# Patient Record
Sex: MALE | Race: WHITE | NOT HISPANIC OR LATINO | Employment: FULL TIME | URBAN - METROPOLITAN AREA
[De-identification: names, ages, dates, MRNs, and addresses within clinical notes are randomized per-mention and may not be internally consistent; named-entity substitution may affect disease eponyms.]

---

## 2017-07-31 ENCOUNTER — OFFICE VISIT (OUTPATIENT)
Dept: FAMILY MEDICINE | Facility: CLINIC | Age: 37
End: 2017-07-31
Payer: COMMERCIAL

## 2017-07-31 ENCOUNTER — RADIANT APPOINTMENT (OUTPATIENT)
Dept: GENERAL RADIOLOGY | Facility: CLINIC | Age: 37
End: 2017-07-31
Attending: FAMILY MEDICINE
Payer: COMMERCIAL

## 2017-07-31 VITALS
DIASTOLIC BLOOD PRESSURE: 75 MMHG | TEMPERATURE: 99.4 F | HEART RATE: 112 BPM | SYSTOLIC BLOOD PRESSURE: 110 MMHG | OXYGEN SATURATION: 97 % | BODY MASS INDEX: 28.76 KG/M2 | HEIGHT: 71 IN | WEIGHT: 205.4 LBS

## 2017-07-31 DIAGNOSIS — J20.9 ACUTE WHEEZY BRONCHITIS: Primary | ICD-10-CM

## 2017-07-31 DIAGNOSIS — F17.200 TOBACCO USE DISORDER: ICD-10-CM

## 2017-07-31 DIAGNOSIS — R05.9 COUGH: ICD-10-CM

## 2017-07-31 DIAGNOSIS — R07.0 THROAT PAIN: ICD-10-CM

## 2017-07-31 LAB
DEPRECATED S PYO AG THROAT QL EIA: NORMAL
MICRO REPORT STATUS: NORMAL
SPECIMEN SOURCE: NORMAL

## 2017-07-31 PROCEDURE — 99214 OFFICE O/P EST MOD 30 MIN: CPT | Performed by: FAMILY MEDICINE

## 2017-07-31 PROCEDURE — 71020 XR CHEST 2 VW: CPT

## 2017-07-31 PROCEDURE — 87880 STREP A ASSAY W/OPTIC: CPT | Performed by: FAMILY MEDICINE

## 2017-07-31 PROCEDURE — 87081 CULTURE SCREEN ONLY: CPT | Performed by: FAMILY MEDICINE

## 2017-07-31 RX ORDER — ALBUTEROL SULFATE 90 UG/1
2 AEROSOL, METERED RESPIRATORY (INHALATION) EVERY 4 HOURS PRN
Qty: 1 INHALER | Refills: 3 | Status: SHIPPED | OUTPATIENT
Start: 2017-07-31 | End: 2018-10-22

## 2017-07-31 RX ORDER — AZITHROMYCIN 250 MG/1
TABLET, FILM COATED ORAL
Qty: 6 TABLET | Refills: 0 | Status: SHIPPED | OUTPATIENT
Start: 2017-07-31 | End: 2018-10-22

## 2017-07-31 NOTE — PATIENT INSTRUCTIONS
Take azithromycin as prescribed for suspected bacterial bronchitis.  Albuterol inhaler 2 puffs every 4 hrs as needed for wheezing or coughing spells or tightness in breathing while active.    Negative strep screen today.  In 2 days, you will be contacted for the culture result.          Bronchitis, Antibiotic Treatment (Adult)    Bronchitis is an infection of the air passages (bronchial tubes) in your lungs. It often occurs when you have a cold. This illness is contagious during the first few days and is spread through the air by coughing and sneezing, or by direct contact (touching the sick person and then touching your own eyes, nose, or mouth).  Symptoms of bronchitis include cough with mucus (phlegm) and low-grade fever. Bronchitis usually lasts 7 to 14 days. Mild cases can be treated with simple home remedies. More severe infection is treated with an antibiotic.  Home care  Follow these guidelines when caring for yourself at home:    If your symptoms are severe, rest at home for the first 2 to 3 days. When you go back to your usual activities, don't let yourself get too tired.    Do not smoke. Also avoid being exposed to secondhand smoke.    You may use over-the-counter medicines to control fever or pain, unless another medicine was prescribed. (Note: If you have chronic liver or kidney disease or have ever had a stomach ulcer or gastrointestinal bleeding, talk with your healthcare provider before using these medicines. Also talk to your provider if you are taking medicine to prevent blood clots.) Aspirin should never be given to anyone younger than 18 years of age who is ill with a viral infection or fever. It may cause severe liver or brain damage.    Your appetite may be poor, so a light diet is fine. Avoid dehydration by drinking 6 to 8 glasses of fluids per day (such as water, soft drinks, sports drinks, juices, tea, or soup). Extra fluids will help loosen secretions in the nose and  lungs.    Over-the-counter cough, cold, and sore-throat medicines will not shorten the length of the illness, but they may be helpful to reduce symptoms. (Note: Do not use decongestants if you have high blood pressure.)    Finish all antibiotic medicine. Do this even if you are feeling better after only a few days.  Follow-up care  Follow up with your healthcare provider, or as advised. If you had an X-ray or ECG (electrocardiogram), a specialist will review it. You will be notified of any new findings that may affect your care.  Note: If you are age 65 or older, or if you have a chronic lung disease or condition that affects your immune system, or you smoke, talk to your healthcare provider about having pneumococcal vaccinations and a yearly influenza vaccination (flu shot).  When to seek medical advice  Call your healthcare provider right away if any of these occur:    Fever of 100.4 F (38 C) or higher    Coughing up increased amounts of colored sputum    Weakness, drowsiness, headache, facial pain, ear pain, or a stiff neck  Call 911, or get immediate medical care  Contact emergency services right away if any of these occur.    Coughing up blood    Worsening weakness, drowsiness, headache, or stiff neck    Trouble breathing, wheezing, or pain with breathing  Date Last Reviewed: 9/13/2015 2000-2017 [employer]. [address] [place] [address], [place] [address]. All rights reserved. This information is not intended as a substitute for professional medical care. Always follow your healthcare professional's instructions.

## 2017-07-31 NOTE — PROGRESS NOTES
SUBJECTIVE:                                                    Raul Abdul is a 37 year old male who presents to clinic today for the following health issues:  Chief Complaint   Patient presents with     Pharyngitis     Pt has had a st for the past few days.       ENT Symptoms             Symptoms: cc Present Absent Comment   Fever/Chills x   Patient denies measuring temp at home.   Fatigue  x     Muscle Aches  x     Eye Irritation   x    Sneezing  x     Nasal Sandor/Drg  x     Sinus Pressure/Pain   x    Loss of smell   x    Dental pain   x    Sore Throat x      Swollen Glands x      Ear Pain/Fullness   x    Cough  x     Wheeze  x     Chest Pain  x  With cough   Shortness of breath  x     Rash   x    Other  x  Headache and diarrhea today     Symptom duration:  3 days   Symptom severity:  moderate   Treatments tried:  nyquil and mucinex   Contacts:  around some kids on Friday     Verified above history with patient.    Patient states first symptoms were sore throat and itchy eyes 2 days ago.  Patient works in information security - office work.    Patient denies recent treated with abx in last 2 months.    Patient traveled out of state in last few weeks.    Patient states he helped set up a blow up slide day before he felt sick - patient reports the equipment was damp and moldy.    Patient has no known asthma or chronic lung disease.    Problem list and histories reviewed & adjusted, as indicated.  Additional history: as documented    Patient Active Problem List   Diagnosis     Nevus comedonicus     Dyshidrotic dermatitis     Tobacco use disorder     No past surgical history on file.    Social History   Substance Use Topics     Smoking status: Current Every Day Smoker     Packs/day: 0.50     Years: 12.00     Types: Cigarettes     Smokeless tobacco: Never Used     Alcohol use 0.0 oz/week      Comment: Socially, rare. 6 beers in a month     Family History   Problem Relation Age of Onset     DIABETES Mother       "Hypertension Mother      DIABETES Father      Hypertension Father      CANCER Brother 40     Skin cancer, unsure which type         Current Outpatient Prescriptions   Medication Sig Dispense Refill     azithromycin (ZITHROMAX) 250 MG tablet Two tablets first day, then one tablet daily for four days. 6 tablet 0     albuterol (PROAIR HFA/PROVENTIL HFA/VENTOLIN HFA) 108 (90 BASE) MCG/ACT Inhaler Inhale 2 puffs into the lungs every 4 hours as needed for shortness of breath / dyspnea 1 Inhaler 3     sildenafil (REVATIO/VIAGRA) 20 MG tablet Take 1 tablet daily as needed. 30 tablet 0     No Known Allergies      Reviewed and updated as needed this visit by clinical staffAllergies  Meds  Problems       Reviewed and updated as needed this visit by Provider  Allergies  Meds  Problems         ROS:  C: NEGATIVE for fever, chills, or change in weight  I: NEGATIVE for worrisome rashes, moles or lesions  E: NEGATIVE for vision changes or irritation  ENT/MOUTH: see above  RESP:as above  CV: NEGATIVE for chest pain, palpitations or peripheral edema  GI: NEGATIVE for nausea, abdominal pain, heartburn, or change in bowel habits  M: NEGATIVE for significant arthralgias or myalgia    OBJECTIVE:                                                    /75  Pulse 112  Temp 99.4  F (37.4  C) (Tympanic)  Ht 5' 11\" (1.803 m)  Wt 205 lb 6.4 oz (93.2 kg)  SpO2 97%  BMI 28.65 kg/m2  Body mass index is 28.65 kg/(m^2).  GENERAL: alert and no distress  EYES: pink conjunctivae, no icterus  NECK: mildly tender cervical LAD present  HEENT: tympanic membrane intact and pearly bilaterally, nose with moderate congestion, no sinus tenderness, throat mildly erythematous, tonsils grade non-enlarged with no exudates, no oral ulcers  RESP: fair to good air entry; mild crackles right mid to lower lungs, otherwise no crackles all other fields; mild end-expiratory wheezing left upper and right upper/middle/lower lungs.  CV: regular rates and rhythm, " normal S1 S2, no S3 or S4 and no murmur  SKIN:  Good turgor, no rashes    Diagnostic test results:  Diagnostic Test Results:  Results for orders placed or performed in visit on 07/31/17 (from the past 24 hour(s))   Strep, Rapid Screen   Result Value Ref Range    Specimen Description Throat     Rapid Strep A Screen       NEGATIVE: No Group A streptococcal antigen detected by immunoassay, await   culture report.      Micro Report Status FINAL 07/31/2017           ASSESSMENT/PLAN:                                                      ICD-10-CM    1. Acute wheezy bronchitis J20.9 azithromycin (ZITHROMAX) 250 MG tablet     albuterol (PROAIR HFA/PROVENTIL HFA/VENTOLIN HFA) 108 (90 BASE) MCG/ACT Inhaler  Reviewed with patient CXR images; report pending.  No lobar consolidation to suggest pneumonia.  Treating empirically and clinically as bacterial bronchitis.  Discussed symptomatic measures; see below.  Return precautions discussed and given to patient.     2. Throat pain R07.0 Strep, Rapid Screen     Beta strep group A culture  Advised patient  of negative screen; will wait for culture.     3. Cough R05 XR Chest 2 Views  With smoking hx, patient may also has chronic cough that can be aggravated by concommitant infection.     4.      Tobacco use disorder    Follow up with Provider - 2 days if not improved or if worsening   Patient Instructions   Take azithromycin as prescribed for suspected bacterial bronchitis.  Albuterol inhaler 2 puffs every 4 hrs as needed for wheezing or coughing spells or tightness in breathing while active.    Negative strep screen today.  In 2 days, you will be contacted for the culture result.          Bronchitis, Antibiotic Treatment (Adult)    Bronchitis is an infection of the air passages (bronchial tubes) in your lungs. It often occurs when you have a cold. This illness is contagious during the first few days and is spread through the air by coughing and sneezing, or by direct contact (touching  the sick person and then touching your own eyes, nose, or mouth).  Symptoms of bronchitis include cough with mucus (phlegm) and low-grade fever. Bronchitis usually lasts 7 to 14 days. Mild cases can be treated with simple home remedies. More severe infection is treated with an antibiotic.  Home care  Follow these guidelines when caring for yourself at home:    If your symptoms are severe, rest at home for the first 2 to 3 days. When you go back to your usual activities, don't let yourself get too tired.    Do not smoke. Also avoid being exposed to secondhand smoke.    You may use over-the-counter medicines to control fever or pain, unless another medicine was prescribed. (Note: If you have chronic liver or kidney disease or have ever had a stomach ulcer or gastrointestinal bleeding, talk with your healthcare provider before using these medicines. Also talk to your provider if you are taking medicine to prevent blood clots.) Aspirin should never be given to anyone younger than 18 years of age who is ill with a viral infection or fever. It may cause severe liver or brain damage.    Your appetite may be poor, so a light diet is fine. Avoid dehydration by drinking 6 to 8 glasses of fluids per day (such as water, soft drinks, sports drinks, juices, tea, or soup). Extra fluids will help loosen secretions in the nose and lungs.    Over-the-counter cough, cold, and sore-throat medicines will not shorten the length of the illness, but they may be helpful to reduce symptoms. (Note: Do not use decongestants if you have high blood pressure.)    Finish all antibiotic medicine. Do this even if you are feeling better after only a few days.  Follow-up care  Follow up with your healthcare provider, or as advised. If you had an X-ray or ECG (electrocardiogram), a specialist will review it. You will be notified of any new findings that may affect your care.  Note: If you are age 65 or older, or if you have a chronic lung disease or  condition that affects your immune system, or you smoke, talk to your healthcare provider about having pneumococcal vaccinations and a yearly influenza vaccination (flu shot).  When to seek medical advice  Call your healthcare provider right away if any of these occur:    Fever of 100.4 F (38 C) or higher    Coughing up increased amounts of colored sputum    Weakness, drowsiness, headache, facial pain, ear pain, or a stiff neck  Call 911, or get immediate medical care  Contact emergency services right away if any of these occur.    Coughing up blood    Worsening weakness, drowsiness, headache, or stiff neck    Trouble breathing, wheezing, or pain with breathing  Date Last Reviewed: 9/13/2015 2000-2017 [employer]. [address] [place] [address], [place] [address]. All rights reserved. This information is not intended as a substitute for professional medical care. Always follow your healthcare professional's instructions.            Saw Fenton MD  [facility]

## 2017-07-31 NOTE — MR AVS SNAPSHOT
After Visit Summary   7/31/2017    Raul Abdul    MRN: [id number]           Patient Information     Date Of Birth          1980        Visit Information        Provider Department      7/31/2017 6:20 PM Saw Fenton MD [facility]        Today's Diagnoses     Acute wheezy bronchitis    -  1    Throat pain        Cough          Care Instructions    Take azithromycin as prescribed for suspected bacterial bronchitis.  Albuterol inhaler 2 puffs every 4 hrs as needed for wheezing or coughing spells or tightness in breathing while active.    Negative strep screen today.  In 2 days, you will be contacted for the culture result.          Bronchitis, Antibiotic Treatment (Adult)    Bronchitis is an infection of the air passages (bronchial tubes) in your lungs. It often occurs when you have a cold. This illness is contagious during the first few days and is spread through the air by coughing and sneezing, or by direct contact (touching the sick person and then touching your own eyes, nose, or mouth).  Symptoms of bronchitis include cough with mucus (phlegm) and low-grade fever. Bronchitis usually lasts 7 to 14 days. Mild cases can be treated with simple home remedies. More severe infection is treated with an antibiotic.  Home care  Follow these guidelines when caring for yourself at home:    If your symptoms are severe, rest at home for the first 2 to 3 days. When you go back to your usual activities, don't let yourself get too tired.    Do not smoke. Also avoid being exposed to secondhand smoke.    You may use over-the-counter medicines to control fever or pain, unless another medicine was prescribed. (Note: If you have chronic liver or kidney disease or have ever had a stomach ulcer or gastrointestinal bleeding, talk with your healthcare provider before using these medicines. Also talk to your provider if you are taking medicine to prevent blood clots.) Aspirin should never be  given to anyone younger than 18 years of age who is ill with a viral infection or fever. It may cause severe liver or brain damage.    Your appetite may be poor, so a light diet is fine. Avoid dehydration by drinking 6 to 8 glasses of fluids per day (such as water, soft drinks, sports drinks, juices, tea, or soup). Extra fluids will help loosen secretions in the nose and lungs.    Over-the-counter cough, cold, and sore-throat medicines will not shorten the length of the illness, but they may be helpful to reduce symptoms. (Note: Do not use decongestants if you have high blood pressure.)    Finish all antibiotic medicine. Do this even if you are feeling better after only a few days.  Follow-up care  Follow up with your healthcare provider, or as advised. If you had an X-ray or ECG (electrocardiogram), a specialist will review it. You will be notified of any new findings that may affect your care.  Note: If you are age 65 or older, or if you have a chronic lung disease or condition that affects your immune system, or you smoke, talk to your healthcare provider about having pneumococcal vaccinations and a yearly influenza vaccination (flu shot).  When to seek medical advice  Call your healthcare provider right away if any of these occur:    Fever of 100.4 F (38 C) or higher    Coughing up increased amounts of colored sputum    Weakness, drowsiness, headache, facial pain, ear pain, or a stiff neck  Call 911, or get immediate medical care  Contact emergency services right away if any of these occur.    Coughing up blood    Worsening weakness, drowsiness, headache, or stiff neck    Trouble breathing, wheezing, or pain with breathing  Date Last Reviewed: 9/13/2015 2000-2017 [employer]. [address] [place] [address], [place] [address]. All rights reserved. This information is not intended as a substitute for professional medical care. Always follow your healthcare professional's instructions.                 "Follow-ups after your visit        Who to contact     If you have questions or need follow up information about today's clinic visit or your schedule please contact [facility] directly at [phone].  Normal or non-critical lab and imaging results will be communicated to you by MyChart, letter or phone within 4 business days after the clinic has received the results. If you do not hear from us within 7 days, please contact the clinic through MyChart or phone. If you have a critical or abnormal lab result, we will notify you by phone as soon as possible.  Submit refill requests through [employer] or call your pharmacy and they will forward the refill request to us. Please allow 3 business days for your refill to be completed.          Additional Information About Your Visit        [employer] Information     [employer] lets you send messages to your doctor, view your test results, renew your prescriptions, schedule appointments and more. To sign up, go to [email or web address] . Click on \"Log in\" on the left side of the screen, which will take you to the Welcome page. Then click on \"Sign up Now\" on the right side of the page.     You will be asked to enter the access code listed below, as well as some personal information. Please follow the directions to create your username and password.     Your access code is: [email or web address]  Expires: 10/29/2017  6:49 PM     Your access code will  in 90 days. If you need help or a new code, please call your [facility] clinic or [phone].        Care EveryWhere ID     This is your Care EveryWhere ID. This could be used by other organizations to access your [facility] medical records  [id number]        Your Vitals Were     Pulse Temperature Height BMI (Body Mass Index)          112 99.4  F (37.4  C) (Tympanic) 5' 11\" (1.803 m) 28.65 kg/m2         Blood Pressure from Last 3 Encounters:   17 110/75   16 110/72   16 109/65    Weight from Last 3 " Encounters:   07/31/17 205 lb 6.4 oz (93.2 kg)   08/02/16 211 lb (95.7 kg)   03/18/16 210 lb (95.3 kg)              We Performed the Following     Beta strep group A culture     Strep, Rapid Screen     XR Chest 2 Views          Today's Medication Changes          These changes are accurate as of: 7/31/17  6:49 PM.  If you have any questions, ask your nurse or doctor.               Start taking these medicines.        Dose/Directions    albuterol 108 (90 BASE) MCG/ACT Inhaler   Commonly known as:  PROAIR HFA/PROVENTIL HFA/VENTOLIN HFA   Used for:  Acute wheezy bronchitis   Started by:  Saw Fenton MD        Dose:  2 puff   Inhale 2 puffs into the lungs every 4 hours as needed for shortness of breath / dyspnea   Quantity:  1 Inhaler   Refills:  3       azithromycin 250 MG tablet   Commonly known as:  ZITHROMAX   Used for:  Acute wheezy bronchitis   Started by:  Saw Fenton MD        Two tablets first day, then one tablet daily for four days.   Quantity:  6 tablet   Refills:  0            Where to get your medicines      [id number] - [address] VINES [address] [address], [place] [address]     Phone:  [phone]     albuterol 108 (90 BASE) MCG/ACT Inhaler    azithromycin 250 MG tablet                Primary Care Provider Office Phone # Fax #    Srinath Efrain Jaime [phone]        [facility] [address] [facility] [address]  [facility] [address]        Equal Access to Services     [facility]: Hadii aad ku hadasho Soomaali, waaxda luqadaha, qaybta kaalmada adeegyada, dali naranjo. So [facility] [phone].    ATENCIÓN: Si habla español, tiene a lawson disposición servicios gratuitos de asistencia lingüística. Llame al [phone].    We comply with applicable federal civil rights laws and [place] laws. We do not discriminate on the  basis of race, color, national origin, age, disability sex, sexual orientation or gender identity.            Thank you!     Thank you for choosing [facility]  for your care. Our goal is always to provide you with excellent care. Hearing back from our patients is one way we can continue to improve our services. Please take a few minutes to complete the written survey that you may receive in the mail after your visit with us. Thank you!             Your Updated Medication List - Protect others around you: Learn how to safely use, store and throw away your medicines at www.disposemymeds.org.          This list is accurate as of: 7/31/17  6:49 PM.  Always use your most recent med list.                   Brand Name Dispense Instructions for use Diagnosis    albuterol 108 (90 BASE) MCG/ACT Inhaler    PROAIR HFA/PROVENTIL HFA/VENTOLIN HFA    1 Inhaler    Inhale 2 puffs into the lungs every 4 hours as needed for shortness of breath / dyspnea    Acute wheezy bronchitis       azithromycin 250 MG tablet    ZITHROMAX    6 tablet    Two tablets first day, then one tablet daily for four days.    Acute wheezy bronchitis       sildenafil 20 MG tablet    REVATIO/VIAGRA    30 tablet    Take 1 tablet daily as needed.    Erectile dysfunction, unspecified erectile dysfunction type

## 2017-07-31 NOTE — NURSING NOTE
"Chief Complaint   Patient presents with     Pharyngitis     Pt has had a st for the past few days.       Initial /75  Pulse 112  Temp 99.4  F (37.4  C) (Tympanic)  Ht 5' 11\" (1.803 m)  Wt 205 lb 6.4 oz (93.2 kg)  BMI 28.65 kg/m2 Estimated body mass index is 28.65 kg/(m^2) as calculated from the following:    Height as of this encounter: 5' 11\" (1.803 m).    Weight as of this encounter: 205 lb 6.4 oz (93.2 kg).  Medication Reconciliation: complete  Viola Palomino CMA    "

## 2017-08-01 LAB
BACTERIA SPEC CULT: NORMAL
MICRO REPORT STATUS: NORMAL
SPECIMEN SOURCE: NORMAL

## 2018-10-22 ENCOUNTER — OFFICE VISIT (OUTPATIENT)
Dept: FAMILY MEDICINE | Facility: CLINIC | Age: 38
End: 2018-10-22
Payer: COMMERCIAL

## 2018-10-22 VITALS
BODY MASS INDEX: 28.84 KG/M2 | HEIGHT: 71 IN | WEIGHT: 206 LBS | OXYGEN SATURATION: 99 % | HEART RATE: 89 BPM | RESPIRATION RATE: 16 BRPM | TEMPERATURE: 97.1 F | SYSTOLIC BLOOD PRESSURE: 126 MMHG | DIASTOLIC BLOOD PRESSURE: 82 MMHG

## 2018-10-22 DIAGNOSIS — Z00.00 ROUTINE HISTORY AND PHYSICAL EXAMINATION OF ADULT: Primary | ICD-10-CM

## 2018-10-22 DIAGNOSIS — Z11.3 SCREEN FOR STD (SEXUALLY TRANSMITTED DISEASE): ICD-10-CM

## 2018-10-22 DIAGNOSIS — R06.83 SNORING: ICD-10-CM

## 2018-10-22 DIAGNOSIS — F17.200 TOBACCO USE DISORDER: ICD-10-CM

## 2018-10-22 LAB
ALBUMIN SERPL-MCNC: 4.1 G/DL (ref 3.4–5)
ALP SERPL-CCNC: 88 U/L (ref 40–150)
ALT SERPL W P-5'-P-CCNC: 21 U/L (ref 0–70)
ANION GAP SERPL CALCULATED.3IONS-SCNC: 4 MMOL/L (ref 3–14)
AST SERPL W P-5'-P-CCNC: 15 U/L (ref 0–45)
BILIRUB SERPL-MCNC: 0.2 MG/DL (ref 0.2–1.3)
BUN SERPL-MCNC: 16 MG/DL (ref 7–30)
CALCIUM SERPL-MCNC: 8.9 MG/DL (ref 8.5–10.1)
CHLORIDE SERPL-SCNC: 104 MMOL/L (ref 94–109)
CO2 SERPL-SCNC: 31 MMOL/L (ref 20–32)
CREAT SERPL-MCNC: 0.84 MG/DL (ref 0.66–1.25)
ERYTHROCYTE [DISTWIDTH] IN BLOOD BY AUTOMATED COUNT: 12.5 % (ref 10–15)
GFR SERPL CREATININE-BSD FRML MDRD: >90 ML/MIN/1.7M2
GLUCOSE SERPL-MCNC: 91 MG/DL (ref 70–99)
HCT VFR BLD AUTO: 44.1 % (ref 40–53)
HGB BLD-MCNC: 15 G/DL (ref 13.3–17.7)
MCH RBC QN AUTO: 31.4 PG (ref 26.5–33)
MCHC RBC AUTO-ENTMCNC: 34 G/DL (ref 31.5–36.5)
MCV RBC AUTO: 93 FL (ref 78–100)
PLATELET # BLD AUTO: 314 10E9/L (ref 150–450)
POTASSIUM SERPL-SCNC: 3.6 MMOL/L (ref 3.4–5.3)
PROT SERPL-MCNC: 7.6 G/DL (ref 6.8–8.8)
RBC # BLD AUTO: 4.77 10E12/L (ref 4.4–5.9)
SODIUM SERPL-SCNC: 139 MMOL/L (ref 133–144)
WBC # BLD AUTO: 6.4 10E9/L (ref 4–11)

## 2018-10-22 PROCEDURE — 36415 COLL VENOUS BLD VENIPUNCTURE: CPT | Performed by: FAMILY MEDICINE

## 2018-10-22 PROCEDURE — 87491 CHLMYD TRACH DNA AMP PROBE: CPT | Performed by: FAMILY MEDICINE

## 2018-10-22 PROCEDURE — 87389 HIV-1 AG W/HIV-1&-2 AB AG IA: CPT | Performed by: FAMILY MEDICINE

## 2018-10-22 PROCEDURE — 87340 HEPATITIS B SURFACE AG IA: CPT | Performed by: FAMILY MEDICINE

## 2018-10-22 PROCEDURE — 85027 COMPLETE CBC AUTOMATED: CPT | Performed by: FAMILY MEDICINE

## 2018-10-22 PROCEDURE — 80053 COMPREHEN METABOLIC PANEL: CPT | Performed by: FAMILY MEDICINE

## 2018-10-22 PROCEDURE — 86803 HEPATITIS C AB TEST: CPT | Performed by: FAMILY MEDICINE

## 2018-10-22 PROCEDURE — 87591 N.GONORRHOEAE DNA AMP PROB: CPT | Performed by: FAMILY MEDICINE

## 2018-10-22 PROCEDURE — 99395 PREV VISIT EST AGE 18-39: CPT | Performed by: FAMILY MEDICINE

## 2018-10-22 ASSESSMENT — ENCOUNTER SYMPTOMS
NERVOUS/ANXIOUS: 0
PALPITATIONS: 0
CONSTIPATION: 0
FREQUENCY: 0
HEARTBURN: 0
DIARRHEA: 0
COUGH: 0
FEVER: 0
NAUSEA: 0
DIZZINESS: 0
WEAKNESS: 0
HEADACHES: 0
SHORTNESS OF BREATH: 0
EYE PAIN: 0
MYALGIAS: 0
PARESTHESIAS: 0
DYSURIA: 0
ARTHRALGIAS: 0
ABDOMINAL PAIN: 0
SORE THROAT: 0
HEMATOCHEZIA: 0
CHILLS: 0
HEMATURIA: 0
JOINT SWELLING: 0

## 2018-10-22 NOTE — NURSING NOTE
"Chief Complaint   Patient presents with     Physical     Health Maintenance     p2       Initial /82  Pulse 89  Temp 97.1  F (36.2  C) (Oral)  Resp 16  Ht 5' 11\" (1.803 m)  Wt 206 lb (93.4 kg)  SpO2 99%  BMI 28.73 kg/m2 Estimated body mass index is 28.73 kg/(m^2) as calculated from the following:    Height as of this encounter: 5' 11\" (1.803 m).    Weight as of this encounter: 206 lb (93.4 kg).    Billie Rose CMA        "

## 2018-10-22 NOTE — MR AVS SNAPSHOT
After Visit Summary   10/22/2018    Raul Abdul    MRN: [id number]           Patient Information     Date Of Birth          1980        Visit Information        Provider Department      10/22/2018 2:50 PM Juan Gonzales MD [facility]        Today's Diagnoses     Routine history and physical examination of adult    -  1    Screen for STD (sexually transmitted disease)        Tobacco use disorder        Snoring           Follow-ups after your visit        Additional Services     SLEEP EVALUATION & MANAGEMENT REFERRAL - [facility]  [phone] (Age 15 and up)       Please be aware that coverage of these services is subject to the terms and limitations of your health insurance plan.  Call member services at your health plan with any benefit or coverage questions.      Please bring the following to your appointment:    >>   List of current medications   >>   This referral request   >>   Any documents/labs given to you for this referral                      Future tests that were ordered for you today     Open Future Orders        Priority Expected Expires Ordered    SLEEP EVALUATION & MANAGEMENT REFERRAL - [facility]  [phone] (Age 15 and up) Routine  10/22/2019 10/22/2018            Who to contact     If you have questions or need follow up information about today's clinic visit or your schedule please contact [facility] directly at [phone].  Normal or non-critical lab and imaging results will be communicated to you by MyChart, letter or phone within 4 business days after the clinic has received the results. If you do not hear from us within 7 days, please contact the clinic through MyChart or phone. If you have a critical or abnormal lab result, we will notify you by phone as soon as possible.  Submit refill requests through [employer] or call your pharmacy and they will forward the refill  "request to us. Please allow 3 business days for your refill to be completed.          Additional Information About Your Visit        MyChart Information     [employer] gives you secure access to your electronic health record. If you see a primary care provider, you can also send messages to your care team and make appointments. If you have questions, please call your primary care clinic.  If you do not have a primary care provider, please call [phone] and they will assist you.        Care EveryWhere ID     This is your Care EveryWhere ID. This could be used by other organizations to access your [place] medical records  [id number]        Your Vitals Were     Pulse Temperature Respirations Height Pulse Oximetry BMI (Body Mass Index)    89 97.1  F (36.2  C) (Oral) 16 5' 11\" (1.803 m) 99% 28.73 kg/m2       Blood Pressure from Last 3 Encounters:   10/22/18 126/82   07/31/17 110/75   08/02/16 110/72    Weight from Last 3 Encounters:   10/22/18 206 lb (93.4 kg)   07/31/17 205 lb 6.4 oz (93.2 kg)   08/02/16 211 lb (95.7 kg)              We Performed the Following     CBC with platelets     Chlamydia trachomatis PCR     Comprehensive metabolic panel (BMP + Alb, Alk Phos, ALT, AST, Total. Bili, TP)     HIV Antigen Antibody Combo     Neisseria gonorrhoeae PCR        Primary Care Provider Office Phone # Fax #    Srinath Efrain Jaime [phone]       [address] [place], [place] [id number]        Equal Access to Services     JOE CHAUDHARY : Hadii aad ku hadasho Soomaali, waaxda luqadaha, qaybta kaalmada adeegyada, waxay fredyin haybreannan henrique rothman'adry . So [facility] [phone].    ATENCIÓN: Si habla español, tiene a lawson disposición servicios gratuitos de asistencia lingüística. Llame al [phone].    We comply with applicable federal civil rights laws and [place] laws. We do not discriminate on the basis of race, color, national origin, age, disability, sex, sexual orientation, or gender " identity.            Thank you!     [facility] you for [facility]. Our goal is always to provide you with excellent care. Hearing back from our patients is one way we can continue to improve our services. Please take a few minutes to complete the written survey that you may receive in the mail after your visit with us. Thank you!             Your Updated Medication List - Protect others around you: Learn how to safely use, store and throw away your medicines at www.disposemymeds.org.          This list is accurate as of 10/22/18  3:23 PM.  Always use your most recent med list.                   Brand Name Dispense Instructions for use Diagnosis    sildenafil 20 MG tablet    REVATIO    30 tablet    Take 1 tablet daily as needed.    Erectile dysfunction, unspecified erectile dysfunction type

## 2018-10-23 LAB
HBV SURFACE AG SERPL QL IA: NONREACTIVE
HCV AB SERPL QL IA: NONREACTIVE
HIV 1+2 AB+HIV1 P24 AG SERPL QL IA: NONREACTIVE

## 2018-10-23 NOTE — PROGRESS NOTES
Al Carter,       Your recent test results are attached, if you have any questions or concerns please feel free to contact me via e-mail or call [phone].  I am covering for DR. Gonzales. Hepatitis and HIV testing negative.       Sincerely,  Mayela Herrera PA-C

## 2018-10-24 LAB
C TRACH DNA SPEC QL NAA+PROBE: NEGATIVE
N GONORRHOEA DNA SPEC QL NAA+PROBE: NEGATIVE
SPECIMEN SOURCE: NORMAL
SPECIMEN SOURCE: NORMAL

## 2019-01-21 ENCOUNTER — HOSPITAL ENCOUNTER (EMERGENCY)
Facility: CLINIC | Age: 39
Discharge: HOME | End: 2019-01-21
Attending: EMERGENCY MEDICINE | Admitting: EMERGENCY MEDICINE
Payer: COMMERCIAL

## 2019-01-21 ENCOUNTER — TELEPHONE (OUTPATIENT)
Dept: FAMILY MEDICINE | Facility: CLINIC | Age: 39
End: 2019-01-21

## 2019-01-21 VITALS
RESPIRATION RATE: 16 BRPM | WEIGHT: 195 LBS | HEART RATE: 93 BPM | OXYGEN SATURATION: 95 % | TEMPERATURE: 98.2 F | HEIGHT: 72 IN | BODY MASS INDEX: 26.41 KG/M2 | DIASTOLIC BLOOD PRESSURE: 61 MMHG | SYSTOLIC BLOOD PRESSURE: 100 MMHG

## 2019-01-21 DIAGNOSIS — G44.209 TENSION HEADACHE: ICD-10-CM

## 2019-01-21 DIAGNOSIS — F07.81 POSTCONCUSSION SYNDROME: ICD-10-CM

## 2019-01-21 DIAGNOSIS — R11.2 NON-INTRACTABLE VOMITING WITH NAUSEA, UNSPECIFIED VOMITING TYPE: ICD-10-CM

## 2019-01-21 PROCEDURE — 25000132 ZZH RX MED GY IP 250 OP 250 PS 637: Performed by: EMERGENCY MEDICINE

## 2019-01-21 PROCEDURE — 99284 EMERGENCY DEPT VISIT MOD MDM: CPT | Mod: 25 | Performed by: EMERGENCY MEDICINE

## 2019-01-21 PROCEDURE — 99284 EMERGENCY DEPT VISIT MOD MDM: CPT | Mod: Z6 | Performed by: EMERGENCY MEDICINE

## 2019-01-21 PROCEDURE — 96361 HYDRATE IV INFUSION ADD-ON: CPT | Performed by: EMERGENCY MEDICINE

## 2019-01-21 PROCEDURE — 96374 THER/PROPH/DIAG INJ IV PUSH: CPT | Performed by: EMERGENCY MEDICINE

## 2019-01-21 PROCEDURE — 25000128 H RX IP 250 OP 636: Performed by: EMERGENCY MEDICINE

## 2019-01-21 PROCEDURE — 96375 TX/PRO/DX INJ NEW DRUG ADDON: CPT | Performed by: EMERGENCY MEDICINE

## 2019-01-21 RX ORDER — ACETAMINOPHEN 500 MG
1000 TABLET ORAL ONCE
Status: COMPLETED | OUTPATIENT
Start: 2019-01-21 | End: 2019-01-21

## 2019-01-21 RX ORDER — METOCLOPRAMIDE HYDROCHLORIDE 5 MG/ML
10 INJECTION INTRAMUSCULAR; INTRAVENOUS
Status: COMPLETED | OUTPATIENT
Start: 2019-01-21 | End: 2019-01-21

## 2019-01-21 RX ORDER — SODIUM CHLORIDE 9 MG/ML
1000 INJECTION, SOLUTION INTRAVENOUS CONTINUOUS
Status: DISCONTINUED | OUTPATIENT
Start: 2019-01-21 | End: 2019-01-21 | Stop reason: HOSPADM

## 2019-01-21 RX ORDER — ONDANSETRON 4 MG/1
4 TABLET, ORALLY DISINTEGRATING ORAL EVERY 8 HOURS PRN
Qty: 20 TABLET | Refills: 1 | Status: SHIPPED | OUTPATIENT
Start: 2019-01-21 | End: 2019-01-25

## 2019-01-21 RX ORDER — ONDANSETRON 2 MG/ML
4 INJECTION INTRAMUSCULAR; INTRAVENOUS EVERY 30 MIN PRN
Status: DISCONTINUED | OUTPATIENT
Start: 2019-01-21 | End: 2019-01-21 | Stop reason: HOSPADM

## 2019-01-21 RX ADMIN — SODIUM CHLORIDE 1000 ML: 9 INJECTION, SOLUTION INTRAVENOUS at 08:26

## 2019-01-21 RX ADMIN — ONDANSETRON 4 MG: 2 INJECTION INTRAMUSCULAR; INTRAVENOUS at 08:27

## 2019-01-21 RX ADMIN — SODIUM CHLORIDE 1000 ML: 9 INJECTION, SOLUTION INTRAVENOUS at 09:40

## 2019-01-21 RX ADMIN — ACETAMINOPHEN 1000 MG: 500 TABLET ORAL at 08:28

## 2019-01-21 RX ADMIN — METOCLOPRAMIDE 10 MG: 5 INJECTION, SOLUTION INTRAMUSCULAR; INTRAVENOUS at 08:29

## 2019-01-21 ASSESSMENT — ENCOUNTER SYMPTOMS
ABDOMINAL PAIN: 0
FEVER: 0
SHORTNESS OF BREATH: 0

## 2019-01-21 ASSESSMENT — MIFFLIN-ST. JEOR: SCORE: 1842.51

## 2019-01-21 NOTE — TELEPHONE ENCOUNTER
Raul Abdul is a 38 year old male who calls with head injury.    NURSING ASSESSMENT:  Description:  Pt wife calling clinic. Pt fell and hit head on ice yesterday afternoon.  Pt blacked out.  Pt was at [facility] ER last night from about 4:30 pm to 10 pm.  Pt had CT that showed no fx and xray that read no back fx.  Pt was sent home with hydrocodone and Augmentin for a sinus infection. Pt denies difficulty moving arms or legs, slurred speech, altered mental status, confusion, or bleeding.  He is keeping his eyes closed as much as possible, has had a persistent ha and nausea.  Pt has vomited 8 times in the last 30 min.  Pt has some numbness in his toes.    NURSING PLAN: Nursing advice to patient go to ER now for further evaluation.    RECOMMENDED DISPOSITION:  To ED, another person to drive - wife will drive.  Will comply with recommendation: Yes  If further questions/concerns or if symptoms do not improve, worsen or new symptoms develop, call your PCP or [facility] as soon as possible.      Guideline used:  Telephone Triage Protocols for Nurses, Fifth Edition, Abbey Luo p. 307.    Billie Sandoval RN

## 2019-01-21 NOTE — DISCHARGE INSTRUCTIONS
Continue tylenol and ibuprofen.    Alternate these medications every three hours as needed for pain.  (For example, tylenol at 8am, ibuprofen at 11am, tylenol at 2pm, ibuprofen at 5pm, tylenol at 8pm...)    Zofran as needed for nausea, or vomiting.  Follow-up with Concussion clinic

## 2019-01-21 NOTE — LETTER
January 21, 2019      To Whom It May Concern:      Raul Abdul was seen in our Emergency Department today, 01/21/19.  I expect his condition to improve over the next few days.  He may return to work when improved.  I expect him to be able to return no sooner than 1/24/19.  He should be excused from work and jury related activities over the next two-three days based on his medical condition.      Sincerely,        Mandeep Savage MD

## 2019-01-21 NOTE — ED AVS SNAPSHOT
[facility] Emergency Department  [address] [address]  Phone:  [phone]  Fax:  [phone]                                    Raul Abdul   MRN: [id number]    Department:  [facility] Emergency Department   Date of Visit:  1/21/2019           After Visit Summary Signature Page    I have received my discharge instructions, and my questions have been answered. I have discussed any challenges I see with this plan with the nurse or doctor.    ..........................................................................................................................................  Patient/Patient Representative Signature      ..........................................................................................................................................  Patient Representative Print Name and Relationship to Patient    ..................................................               ................................................  Date                                   Time    ..........................................................................................................................................  Reviewed by Signature/Title    ...................................................              ..............................................  Date                                               Time          22EPIC Rev 08/18

## 2019-01-21 NOTE — ED PROVIDER NOTES
History     Chief Complaint   Patient presents with     Vomiting     HPI  Raul Abdul is a 38 year old male who has past medical history significant for tobacco abuse, presenting to the emergency department with concerns regarding nausea, vomiting, and headache.  Patient was actually just seen at [facility] yesterday after patient had fallen around 4 PM, hitting the back of his head yesterday against the ice while ice skating.  Patient did not have loss of consciousness, however had severe headache, with nausea, and vomiting, and at [facility] had negative imaging workup.  I reviewed ED visit through medical records.  Patient had negative head CT, with negative cervical spine CT as well.  He also had negative x-rays of the lumbar spine.    Patient had been discharged home with oral analgesics, including Vicodin.  Patient attempted to take 1 of those tablets, however did not have any nausea medicine either, and has been with multiple episodes of vomiting throughout the overnight hours.  Any sort of movement is making the pain worse.  No weakness of the arms, or legs.  No numbness, or tingling of the arms, or legs.  Agent with no prior severe head injuries.    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    Past Surgical History:    No past surgical history on file.    Family History:    Family History   Problem Relation Age of Onset     Diabetes Mother      Hypertension Mother      Diabetes Father      Hypertension Father      Cancer Brother 40        Skin cancer, unsure which type       Social History:  Marital Status:   [2]  Social History     Tobacco Use     Smoking status: Current Every Day Smoker      Packs/day: 0.50     Years: 12.00     Pack years: 6.00     Types: Cigarettes     Smokeless tobacco: Never Used   Substance Use Topics     Alcohol use: Yes     Alcohol/week: 0.0 oz     Comment: Socially, rare. 6 beers in a month     Drug use: No        Medications:      ondansetron (ZOFRAN ODT) 4 MG ODT tab   sildenafil (REVATIO/VIAGRA) 20 MG tablet         Review of Systems   Constitutional: Negative for fever.   Respiratory: Negative for shortness of breath.    Cardiovascular: Negative for chest pain.   Gastrointestinal: Negative for abdominal pain.   Neurological:        See HPI   All other systems reviewed and are negative.      Physical Exam   BP: 102/70  Pulse: 89  Heart Rate: 96  Temp: 98.2  F (36.8  C)  Resp: 16  Height: 182.9 cm (6')  Weight: 88.5 kg (195 lb)  SpO2: 96 %      Physical Exam  /61   Pulse 93   Temp 98.2  F (36.8  C) (Oral)   Resp 16   Ht 1.829 m (6')   Wt 88.5 kg (195 lb)   SpO2 95%   BMI 26.45 kg/m    /61   Pulse 93   Temp 98.2  F (36.8  C) (Oral)   Resp 16   Ht 1.829 m (6')   Wt 88.5 kg (195 lb)   SpO2 95%   BMI 26.45 kg/m    General: alert and uncomfortable appearing.    Head: atraumatic, normocephalic  Abd: Soft, nontender, nondistended, no peritoneal signs  Musculoskel/Extremities: normal extremities, no edema, erythema, tenderness and full AROM of major joints without tenderness  Skin: no rashes, no diaphoresis and skin color normal  Neuro: Patient awake, alert, oriented, speech is fluent,    Psychiatric: affect/mood normal, cooperative, normal judgement/insight and memory intact          ED Course        Procedures               Critical Care time:  none               No results found for this or any previous visit (from the past 24 hour(s)).    Medications   0.9% sodium chloride BOLUS (0 mLs Intravenous Stopped 1/21/19 0940)     Followed by   sodium chloride 0.9% infusion (0 mLs Intravenous Stopped 1/21/19 1046)   ondansetron (ZOFRAN) injection 4 mg (4 mg  Intravenous Given 1/21/19 0827)   metoclopramide (REGLAN) injection 10 mg (10 mg Intravenous Given 1/21/19 0829)   acetaminophen (TYLENOL) tablet 1,000 mg (1,000 mg Oral Given 1/21/19 0828)       Assessments & Plan (with Medical Decision Making)  38 year old male, with past medical history significant for recent ED visit at [facility] yesterday after patient had fall from standing height while skating.  Patient had negative imaging workup including negative CT of the head, with negative cervical spine imaging that was performed yesterday.  Patient had been discharged home, and since discharge home has had multiple episodes of nonbloody, nonbilious emesis.  Patient has not been able to keep any food, or liquids down.  He has severe headache, and since he has been unable to keep any medicines down, he feels this is worsening his headache.  There are no focal neurologic deficits on exam, with no extremity weakness, and normal sensory exam.  The patient is otherwise healthy, not on any blood thinning medications, and do not feel that delayed bleed, or other risks are present that would warrant additional repeat head CT imaging.  Patient was given Zofran, in addition to Reglan, and had improved symptoms, and patient able to tolerate Tylenol in the ED in addition to Toradol.  He declines any additional analgesic medications.  He appears improved at time of discharge.  Will be discharged home with Zofran.  Follow-up with primary care provider in concussion clinic referral has been placed.  Concern is patient may continue to exhibit postconcussive syndrome symptoms over the next number of days.     I have reviewed the nursing notes.    I have reviewed the findings, diagnosis, plan and need for follow up with the patient.          Medication List      Started    ondansetron 4 MG ODT tab  Commonly known as:  ZOFRAN ODT  4 mg, Oral, EVERY 8 HOURS PRN            Final diagnoses:   Postconcussion syndrome   Non-intractable  vomiting with nausea, unspecified vomiting type   Tension headache       1/21/2019   [facility] EMERGENCY DEPARTMENT     Mandeep Savage MD  01/21/19 0958

## 2019-01-21 NOTE — TELEPHONE ENCOUNTER
Spouse calling was seen at [facility] last pm for a concussion. Is now vomiting would like to speak to nurse.

## 2019-01-21 NOTE — ED NOTES
Fell yesterday at [place] ice skating   And hit back of head-brief loc-seen yesterday and concussion dx-n/v/this am

## 2019-01-22 ENCOUNTER — TELEPHONE (OUTPATIENT)
Dept: FAMILY MEDICINE | Facility: CLINIC | Age: 39
End: 2019-01-22

## 2019-01-22 ENCOUNTER — APPOINTMENT (OUTPATIENT)
Dept: CT IMAGING | Facility: CLINIC | Age: 39
End: 2019-01-22
Payer: COMMERCIAL

## 2019-01-22 ENCOUNTER — HOSPITAL ENCOUNTER (EMERGENCY)
Facility: CLINIC | Age: 39
Discharge: HOME | End: 2019-01-22
Attending: FAMILY MEDICINE | Admitting: FAMILY MEDICINE
Payer: COMMERCIAL

## 2019-01-22 VITALS
TEMPERATURE: 97.9 F | BODY MASS INDEX: 26.45 KG/M2 | HEIGHT: 72 IN | DIASTOLIC BLOOD PRESSURE: 82 MMHG | SYSTOLIC BLOOD PRESSURE: 121 MMHG | OXYGEN SATURATION: 99 %

## 2019-01-22 DIAGNOSIS — R04.0 EPISTAXIS: ICD-10-CM

## 2019-01-22 DIAGNOSIS — S06.0X1A CONCUSSION WITH LOSS OF CONSCIOUSNESS OF 30 MINUTES OR LESS, INITIAL ENCOUNTER: ICD-10-CM

## 2019-01-22 DIAGNOSIS — J01.00 ACUTE MAXILLARY SINUSITIS, RECURRENCE NOT SPECIFIED: ICD-10-CM

## 2019-01-22 PROCEDURE — 99284 EMERGENCY DEPT VISIT MOD MDM: CPT | Mod: Z6 | Performed by: FAMILY MEDICINE

## 2019-01-22 PROCEDURE — 70450 CT HEAD/BRAIN W/O DYE: CPT

## 2019-01-22 PROCEDURE — 99284 EMERGENCY DEPT VISIT MOD MDM: CPT | Mod: 25 | Performed by: FAMILY MEDICINE

## 2019-01-22 RX ORDER — IBUPROFEN 200 MG
600 TABLET ORAL EVERY 6 HOURS PRN
COMMUNITY

## 2019-01-22 RX ORDER — HYDROCODONE BITARTRATE AND ACETAMINOPHEN 10; 325 MG/1; MG/1
1 TABLET ORAL EVERY 4 HOURS PRN
Refills: 0 | COMMUNITY
Start: 2019-01-20 | End: 2019-01-25

## 2019-01-22 RX ORDER — LEVOFLOXACIN 500 MG/1
500 TABLET, FILM COATED ORAL DAILY
Qty: 14 TABLET | Refills: 0 | Status: SHIPPED | OUTPATIENT
Start: 2019-01-22 | End: 2019-02-05

## 2019-01-22 RX ORDER — ACETAMINOPHEN 325 MG/1
650 TABLET ORAL EVERY 6 HOURS
COMMUNITY
End: 2022-12-15

## 2019-01-22 NOTE — ED AVS SNAPSHOT
[facility] Emergency Department  [address] [address]  Phone:  [phone]  Fax:  [phone]                                    Raul Abdul   MRN: [id number]    Department:  [facility] Emergency Department   Date of Visit:  1/22/2019           After Visit Summary Signature Page    I have received my discharge instructions, and my questions have been answered. I have discussed any challenges I see with this plan with the nurse or doctor.    ..........................................................................................................................................  Patient/Patient Representative Signature      ..........................................................................................................................................  Patient Representative Print Name and Relationship to Patient    ..................................................               ................................................  Date                                   Time    ..........................................................................................................................................  Reviewed by Signature/Title    ...................................................              ..............................................  Date                                               Time          22EPIC Rev 08/18

## 2019-01-23 NOTE — DISCHARGE INSTRUCTIONS
Stop your current antibiotic.  Levaquin 500mg once daily for 14 days.  Saline nasal spray 2 sprays each nostril 4 times daily times 10 days, following the nasal spray recommend the use of petroleum jelly around each nostril as we discussed.  Avoid sneezing or blowing forcefully.  Continue with the use of Tylenol/ibuprofen for pain and Zofran for nausea/vomiting.  If not improving or worse please return to the emergency department.  Cognitive resting as discussed.

## 2019-01-23 NOTE — TELEPHONE ENCOUNTER
"Per E.R note dated 1/20/19: Patient Story: fell while ice skating less than an hour ago. States he lost consciousness and feels disoriented. Has posterior head, neck and tailbone pain. Not on thinners Pt also reports he had  \"saliva with pink blood\" come from both his nostrils. Has since stopped.     Interventions/Treatments prior to arrival: none    Luna Teixeira RN .................... 1/20/2019 5:04 PM        Patient reports that he took a shower he got a luggy out and it was mucous but had dried darker and lighter blood in it bloody, red.  Additionally, he blew his nose and blew out a clot size of the peanut and then it happened again.  The vomitus that has happened the past few days the patient stated has been brown in color and was reported to the E.R provider.      Nusing advice:  There was not report of bleeding in any of the E.R. Notes when read.  Due to this being a new symptom and the recent concussion event the patient is to go back to the hospital.  This may be due to the sinus infection/drie mucous membranes, but as previously stated, with the head injury we can not rule out a more serious event over the phone. Patient  verbalizes good understanding, agrees with plan and states she needs no further support. Nory Moreira R.N.          "

## 2019-01-23 NOTE — ED PROVIDER NOTES
"  History     Chief Complaint   Patient presents with     Concussion     emesis since yesterday was in the shower this afternoon coughing up \" a huge blood clot \" nose bleeds with \" large blood clots \"     HPI  Raul Abdul is a 38 year old male, past medical history significant for tobacco use disorder, dyshidrotic dermatitis, presents to the emergency department date 2 post occipital trauma with concussion with concerns of ongoing nasal discharge of mucousy blood clots.  History is obtained from the patient who states that when he originally slipped falling backwards on the ice helping his children learn how to skate he had loss of consciousness and his wife supports this as well as noting amnestic episodes following the blow to the occiput of his head.  He was initially seen at [facility] and imaging obtained of the areas of concern which included his head neck and low back.  They were negative for evidence of fracture or bleeding however his head CT did note times findings consistent with sinusitis.  He was placed on amoxicillin and given Vicodin for pain.  He is not taking the Vicodin and does not want to.  He has been using Tylenol and ibuprofen alternating.  He notes no fever chills or sweats.  No blurred vision or double vision, no changes in hearing.  Difficulties with balance or walking, no speech changes in his personality is his usual according to his wife.  He notes that he really has not been avoiding the use of cell phones or watching TV and does note persistent headaches of variable intensity since the time of the head trauma.  At the time of his original visit nothing was prescribed for nausea and he found nausea and vomiting to be a major concern post trauma day 1 and was seen here yesterday early morning with concerns in this regard and given Zofran.  He is happy to report that this helped and he is only had one small episode of emesis since then.  This afternoon he was taking a shower, a hot " shower, and reports blowing his nose and blowing out mucousy blood clots the size of a pecan.  He was told by the nurse triage line to come immediately to the emergency department.      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 No pertinent past medical history.    Past Surgical History:    History reviewed. No pertinent surgical history.    Family History:    Family History   Problem Relation Age of Onset     Diabetes Mother      Hypertension Mother      Diabetes Father      Hypertension Father      Cancer Brother 40        Skin cancer, unsure which type       Social History:  Marital Status:   [2]  Social History     Tobacco Use     Smoking status: Current Every Day Smoker     Packs/day: 0.50     Years: 12.00     Pack years: 6.00     Types: Cigarettes     Smokeless tobacco: Never Used   Substance Use Topics     Alcohol use: Yes     Alcohol/week: 0.0 oz     Comment: Socially, rare. 6 beers in a month     Drug use: No        Medications:      acetaminophen (TYLENOL) 325 MG tablet   amoxicillin-clavulanate (AUGMENTIN) 875-125 MG tablet   ibuprofen (ADVIL/MOTRIN) 200 MG tablet   ondansetron (ZOFRAN ODT) 4 MG ODT tab   HYDROcodone-acetaminophen (NORCO)  MG per tablet         Review of Systems   All other systems reviewed and are negative.      Physical Exam   BP: 121/82  Heart Rate: 94  Temp: 97.9  F (36.6  C)  Height: 182.9 cm (6')  SpO2: 99 %      Physical Exam   Constitutional: He is oriented to person, place, and time. He appears well-developed and well-nourished.   Alert no acute distress, GCS 15, alert and oriented x3.   HENT:   Head: Normocephalic and atraumatic.   Right Ear: External ear normal.   Left Ear: External ear  normal.   Direct inspection of the nares reveals a normal nostril on the left side, on the right side however there are 2 very small friable vessels with clot on the nasal septum.  I pointed these out to the patient's wife who is able to appreciate the difference from one side to the other.   Eyes: Conjunctivae and EOM are normal. Pupils are equal, round, and reactive to light.   Neck: Normal range of motion. Neck supple.   Cardiovascular: Normal rate and regular rhythm.   Pulmonary/Chest: Effort normal and breath sounds normal.   Abdominal: Soft. Bowel sounds are normal.   Musculoskeletal: Normal range of motion.   Neurological: He is alert and oriented to person, place, and time. He has normal strength. No cranial nerve deficit or sensory deficit. He displays a negative Romberg sign.   Reflex Scores:       Tricep reflexes are 2+ on the right side and 2+ on the left side.       Bicep reflexes are 2+ on the right side and 2+ on the left side.       Brachioradialis reflexes are 2+ on the right side and 2+ on the left side.       Patellar reflexes are 2+ on the right side and 2+ on the left side.       Achilles reflexes are 2+ on the right side and 2+ on the left side.  Skin: Skin is warm. Capillary refill takes less than 2 seconds.   Psychiatric: He has a normal mood and affect. His behavior is normal.   Nursing note and vitals reviewed.      ED Course        Procedures               Critical Care time:  none               Results for orders placed or performed during the hospital encounter of 01/22/19 (from the past 24 hour(s))   CT Head w/o Contrast    Narrative    CT SCAN OF THE HEAD WITHOUT CONTRAST   1/22/2019 9:10 PM     HISTORY: 48 hours post occipital trauma, probable loss of  consciousness, persistent headache, periodic epistaxis.    TECHNIQUE: Axial images of the head and coronal reformations without  IV contrast material. Radiation dose for this scan was reduced using  automated exposure control,  adjustment of the mA and/or kV according  to patient size, or iterative reconstruction technique.    COMPARISON: 3/19/2016    FINDINGS: The ventricles are normal in size, shape and configuration.  The brain parenchyma and subarachnoid spaces are normal. There is no  evidence of intracranial hemorrhage, mass, acute infarct or anomaly.     There are bilateral air-fluid levels in the maxillary sinuses.  Remaining sinuses and mastoids are clear. There is no evidence of  trauma.      Impression    IMPRESSION:   1. Normal CT scan of the brain.  No change.  2. Bilateral maxillary sinus air-fluid levels.      RUSH BREWER MD   9:47 PM  Imaging reviewed with the patient.  Also reviewed and discussed yet again the findings and physical exam.      Medications - No data to display    Assessments & Plan (with Medical Decision Making)   30-year-old male past medical history reviewed as above who presents to the emergency department with concerns of ongoing headache nausea, and the development of blood-tinged nasal discharge as described in the HPI.  This is the third ER visit in approximately 3 days per the patient.  I reviewed both previous presentations as well as performing a complete history and physical exam as detailed above.  The patient is alert and appropriate with stable normal adult range vital signs on exam.  He does have a couple of friable vessels in the right nostril that would be easily irritated and would bleed freely even slight trauma such as blowing or wiping for sleep.  He also has documented fluid in his sinuses and history consistent with sinusitis before the occipital trauma that prompted imaging at the first visit.  It is certainly conceivable that his blood-tinged nasal discharge is from the sinuses.  He was obviously concerned about potential for a previously missed basilar skull fracture and CSF leak and we discussed this.  I do not think his presentation very consistent with that to begin with  however we did obtain additional imaging today and found no additional concerning findings.  No evidence of trauma on his head CT.  We discussed nasal saline sprays and Vaseline around the nares to prevent irritation of the external component.  He was warned that with hot showers and nasal blowing he may expect some blood-tinged mucus.  It has been 48 hours to be started on antibiotics and as the sinus component does not seem to be any better using this pearly I have changed him to an alternate antibiotic after discussing with him.  All questions were answered and patient is disposition to home in unchanged condition.      Disclaimer: This note consists of symbols derived from keyboarding, dictation and/or voice recognition software. As a result, there may be errors in the script that have gone undetected. Please consider this when interpreting information found in this chart.      I have reviewed the nursing notes.    I have reviewed the findings, diagnosis, plan and need for follow up with the patient.             Medication List      There are no discharge medications for this visit.         Final diagnoses:   Epistaxis   Acute maxillary sinusitis, recurrence not specified   Concussion with loss of consciousness of 30 minutes or less, initial encounter       1/22/2019   [facility], [place] EMERGENCY DEPARTMENT     Prashant Braun MD  01/24/19 [id number]

## 2019-01-25 ENCOUNTER — OFFICE VISIT (OUTPATIENT)
Dept: SURGERY | Facility: CLINIC | Age: 39
End: 2019-01-25
Payer: COMMERCIAL

## 2019-01-25 VITALS
OXYGEN SATURATION: 97 % | HEART RATE: 95 BPM | DIASTOLIC BLOOD PRESSURE: 74 MMHG | BODY MASS INDEX: 27.09 KG/M2 | HEIGHT: 72 IN | SYSTOLIC BLOOD PRESSURE: 106 MMHG | WEIGHT: 200 LBS

## 2019-01-25 DIAGNOSIS — R42 DIZZINESS: ICD-10-CM

## 2019-01-25 DIAGNOSIS — H53.149 VISUAL DISCOMFORT, UNSPECIFIED LATERALITY: ICD-10-CM

## 2019-01-25 DIAGNOSIS — S06.0X1A CONCUSSION WITH LOSS OF CONSCIOUSNESS OF 30 MINUTES OR LESS, INITIAL ENCOUNTER: Primary | ICD-10-CM

## 2019-01-25 DIAGNOSIS — G44.201 ACUTE INTRACTABLE TENSION-TYPE HEADACHE: ICD-10-CM

## 2019-01-25 DIAGNOSIS — M54.2 CERVICALGIA: ICD-10-CM

## 2019-01-25 ASSESSMENT — PATIENT HEALTH QUESTIONNAIRE - PHQ9
SUM OF ALL RESPONSES TO PHQ QUESTIONS 1-9: 0
10. IF YOU CHECKED OFF ANY PROBLEMS, HOW DIFFICULT HAVE THESE PROBLEMS MADE IT FOR YOU TO DO YOUR WORK, TAKE CARE OF THINGS AT HOME, OR GET ALONG WITH OTHER PEOPLE: NOT DIFFICULT AT ALL
SUM OF ALL RESPONSES TO PHQ QUESTIONS 1-9: 0

## 2019-01-25 ASSESSMENT — ANXIETY QUESTIONNAIRES
GAD7 TOTAL SCORE: 0
GAD7 TOTAL SCORE: 0
1. FEELING NERVOUS, ANXIOUS, OR ON EDGE: NOT AT ALL
3. WORRYING TOO MUCH ABOUT DIFFERENT THINGS: NOT AT ALL
7. FEELING AFRAID AS IF SOMETHING AWFUL MIGHT HAPPEN: NOT AT ALL
5. BEING SO RESTLESS THAT IT IS HARD TO SIT STILL: NOT AT ALL
GAD7 TOTAL SCORE: 0
6. BECOMING EASILY ANNOYED OR IRRITABLE: NOT AT ALL
7. FEELING AFRAID AS IF SOMETHING AWFUL MIGHT HAPPEN: NOT AT ALL
4. TROUBLE RELAXING: NOT AT ALL
2. NOT BEING ABLE TO STOP OR CONTROL WORRYING: NOT AT ALL

## 2019-01-25 ASSESSMENT — MIFFLIN-ST. JEOR: SCORE: 1865.19

## 2019-01-25 ASSESSMENT — PAIN SCALES - GENERAL: PAINLEVEL: MILD PAIN (3)

## 2019-01-25 NOTE — PATIENT INSTRUCTIONS
"Ilya will contact you to schedule PT.  Please follow-up with your PCP in 1 week.    ~ You WILL get better~    GENERAL ADVICE  ~ Gradually ease back into your usual activities.  ~ Rest as needed to help your symptoms go away.   - Consider pairing 50 minutes of activity with 10 minutes of rest.  ~ Allow yourself more time for activities.  ~ Write things down.  At home, at work, whenever there is something that you should remember, even it is simple.    SCREENS  ~ Change settings on your phone and computer using the \"Blue Light Filter\" (Night Shift on all [employer] products)   ~ The goal is making screens more yellow and less blue.     ~ If this is not an option you can download this program: [employer], to adjust your screen resolution.  ~ Turn screen brightness down  ~ Increase font size    HEADACHE  ~ Take tylenol (1000 mg) three times a day as needed  ~ Take ibuprofen (600 mg) three times a day as needed (take with food)   - reduce as able.    NECK PAIN  ~ Ice or Heat are good~  ~ Massage is ok if it doesn't trigger more symptoms~  ~ Gentle stretches and range-of-motion are helpful    FATIGUE  ~ Daily exercise is strongly encouraged.  Start with a 10 min walk and increase the time as tolerated until you are walking 30 minutes per day.      ANXIETY OR MOOD SWINGS:  ~ If you are irritable or anxious, take a break in a quiet room.  ~ Try using the free Calm stacy for guided breathing and mindfulness/meditation.  ~ Explore [employer] (https://www.headspace.com) for free and easy-to-use meditation guidance.    Diet:  - In principle incorporate more protein, lots of veggies, some fruit, whole grains.    - Less sweets and saturated fat.   - Mediterranean Diet is an easy-to-follow example.  ~ Drink plenty of water throughout the day (8-10 glasses per day)  "

## 2019-01-25 NOTE — PROGRESS NOTES
[facility] Concussion Clinic Evaluation  January 25, 2019        Assessment:   (S06.0X1A) Concussion with loss of consciousness of 30 minutes or less, initial encounter  (primary encounter diagnosis)  (G44.201) Acute intractable tension-type headache  (M54.2) Cervicalgia  (H53.149) Visual discomfort, unspecified laterality  (R42) Dizziness    Raul Abdul is a 38 year old male who presents for concussion with loss of consciousness estimated to be about 90 seconds when he slipped and fell while skating backwards.  He states that he was helping his children learn how to skate when he fell.  He struck his posterior head per bystanders report, however he does not recall the fall.  He starts to remember being helped up but does not remember walking back to the locker room and he only vaguely recalls his initial ER visit.  His friend drove him to the [facility] ED for evaluation.  While he was there he reported having a headache and feeling dizzy, nauseous, and did vomit.  He had some pink colored nasal drips which concerned others around him for possible CSF leak.  His CT head in the ED demonstrated an active sinus infection however no acute ICP was noted.  CT cervical spine was without acute fracture or subluxation.  He was started on Augmentin for his sinus infection.  He was also given Vicodin for pain.  He presented again to the ED the following day with multiple episodes of N/V and be vomiting overnight.  He also noted movement made his pain worse.  After normal exam he was treated with Zofran and Reglan with improvement in his symptoms and discharged with Zofran.  He visited the ED a third consecutive day as well he was in the shower blowing his nose large blood clots came out.  He called the nurse triage line who informed him he should immediately present to the emergency department.  On exam in the right nare there were 2 small friable vessels with surrounding clot on his nasal septum.  Repeat CT head was with bilateral  maxillary sinus air-fluid levels.  He was educated on nasal hygiene and his antibiotic was changed to Levaquin.    Currently, headache is described as dull and constant wrapping from the posterior head  to the forehead.  He feels it is much worse with bright sunlight and direct overhead lighting.  He may be able to forget about his headache if he is distracted.  He has been taking either ibuprofen or Tylenol every 3 hours which she states helps.  And overall feels his equilibrium is off as if he is on a boat.  He has trouble in busy environments.  Zofran is helping with nausea and he is needing to take less and less of this over time.  Nighttime sleeping is impaired however this may be due to taking 12 hours worth of naps during the daytime.  He will become dizzy as if he is having a head rush when standing up.  Saline irrigation is helping his facial pressure.  He has been able to play a video game and read his nose on a phone, up to 5 minutes at a time.  Overall he has been sedentary.  CSA score today is 37/90, with the biggest issues being neck pain and light sensitivity.    Raul has never had a concussion in the past.  He has a history of infrequent tension type headaches perhaps every few months.  He developed migraines in the context of attempting to quit smoking, however currently he is at 1/2 pack/day.  He has never been formally treated for mental health issues but states he tends toward anxiety and uses self coping to manage these feelings.  He also states that teachers in middle school suspected he had ADHD but again he cites the ability to cope for any issues in this regard.  Currently, he is living at home with his wife Walter who is present and supportive today as well as daughters 10 and 5.  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  He states that he uses 3 screens in  "the course of his work.    On exam, Raul is seated bent forward and looks uncomfortable but in no distress.  Neck is supple and with full range of motion.  Paracervical musculature is tender more so on the left than on the right and palpation here reproduces his headache.  Strength, coordination, balance, and gait are normal.  While walking in the ca with his neck tilted to the side he noted an increase in his neck pain but no pathway deviation or dizziness. Oculomotor function is normal including convergence point that is reached without increased HA, dizziness, nausea.  Cognitive screening is normal.      Much of our time was spent in discussion of concussion pathophysiology, symptoms including the likely etiology of his loss of consciousness and amnesia. We discussed that typical resolution of these issues even with LOC and amnesia is between 3 and 6 weeks.  I also suspect that much of his ongoing physical symptoms may be stemming from a comorbid neck injury from the fall.  I have referred him to physical therapy to further address his HA, neck pain, and dizziness.  We looked at his latest CT scan together in detail showing him that there is no evidence for any kind of fracture or obvious \"brain damage\" and also pointed out his active sinus infection.  This was reassuring to him.  He is eager to return back and to some work,   Which I support should be earlier rather than later particularly from the standpoint of his mental health as the longer he is out of work the more work will be piling up for him.  I propose that in general he should gradually return back to all of his activities, simply taking breaks as needed to help manage his symptom levels.  I would like him to trial back next week at 4 hours, 5 days/week but to hold on air travel at this time due to his sinus infection.  He may advance his hours as tolerated.  Please see the AVS for a summary of our discussion regarding symptom management.    Raul " "should follow-up with his PCP in 1-2 weeks for recheck and clearance for return to air travel should his sinusitis be adequately resolved.  He may follow-up here in 4-6 weeks if clinically indicated, though due to lower risk for a prolonged course of symptoms will not pre-arrange follow up at this time.      Plan:  1. Biggest concern of pt addressed: HA    2. Work restrictions:  a.  May return to work as of 1/30/19 on a trial basis with the following restrictions:  i. Work- Up to 4 hrs 5 days a week  1. May advance hours as tolerated.  b. No air travel at this time.    3. Activity recommendations- Light, low impact aerobic activity, advance as tolerated    4. Medications:  a. Discontinue norco    5. Referral to:   a. Physical Therapy:   1. Indications/Goals: evaluation and treatment including but not limited to neck pain, HA, dizziness    6. Follow up with PCP in 1-2 wks, here PRN.    7. Letters written today for:  work    AVS Instructions:  Ilya will contact you to schedule PT.  Please follow-up with your PCP in 1 week.    ~ You WILL get better~    GENERAL ADVICE  ~ Gradually ease back into your usual activities.  ~ Rest as needed to help your symptoms go away.   - Consider pairing 50 minutes of activity with 10 minutes of rest.  ~ Allow yourself more time for activities.  ~ Write things down.  At home, at work, whenever there is something that you should remember, even it is simple.    SCREENS  ~ Change settings on your phone and computer using the \"Blue Light Filter\" (Night Shift on all [employer] products)   ~ The goal is making screens more yellow and less blue.     ~ If this is not an option you can download this program: RealD, to adjust your screen resolution.  ~ Turn screen brightness down  ~ Increase font size    HEADACHE  ~ Take tylenol (1000 mg) three times a day as needed  ~ Take ibuprofen (600 mg) three times a day as needed (take with food)   - reduce as able.    NECK PAIN  ~ Ice or Heat are good~  ~ " Massage is ok if it doesn't trigger more symptoms~  ~ Gentle stretches and range-of-motion are helpful    FATIGUE  ~ Daily exercise is strongly encouraged.  Start with a 10 min walk and increase the time as tolerated until you are walking 30 minutes per day.      ANXIETY OR MOOD SWINGS:  ~ If you are irritable or anxious, take a break in a quiet room.  ~ Try using the free Calm stacy for guided breathing and mindfulness/meditation.  ~ Explore RealD (https://www.headspace.com) for free and easy-to-use meditation guidance.    Diet:  - In principle incorporate more protein, lots of veggies, some fruit, whole grains.    - Less sweets and saturated fat.   - Mediterranean Diet is an easy-to-follow example.  ~ Drink plenty of water throughout the day (8-10 glasses per day)      HPI  Date of injury: 1/20/19  Mechanism: Fall while skating backwards, + LOC.  + amnesia.    helping his children learn how to skate. Posterior head strike, brief LOC ~90 sec.  No recall of fall.  Starts to remember being helped up but not walking back to locker room.  Vague recall of 1st ED visit.    Friend drove to [facility] ED for eval. Patient reports fall with while skating backwards with likely LOC. He is amnesic to events. Reports HA and dizzy. Nausea, vomiting resolved. No blurred vision, neuro intact. No drainage from nose currently however he did mention that he had pink color nasal gtt.    CT head w/ active sinus infection, no acute ICP.  CT cervical spine w/o acute Fx or subluxation.  Started on augmentin    ED visit next day 1/21/19:   Patient had been discharged home with oral analgesics, including Vicodin.  Patient attempted to take 1 of those tablets, however did not have any nausea medicine either, and has been with multiple episodes of (NBNB) vomiting throughout the overnight hours.  Any sort of movement is making the pain worse.  Normal exam, Tx zofran and reglan and felt better, sent home with zofran.    ED visit next day  1/22/19:  This afternoon he was taking a shower, a hot shower, and reports blowing his nose and blowing out mucousy blood clots the size of a pecan.  He was told by the nurse triage line to come immediately to the emergency department.  right side nare there are 2 very small friable vessels with clot on the nasal septum.  CT head w/ bilat maxillary sinus air-fluid levels.  Nasal hygiene recs, changed to Levaquin    HA: posterior and wraps to front, dull.  Constant.  Much worse with bright sunlight and direct overhead lighting.  Might be able to forget about it if distracted.    - Every 3 hours taking either 600mg ibuprofen or 650mg of Tylenol.  Helping.    - starting to feel more facial pressure.  Saline irrigation helps.    Dizzy- head rush with standing up.  Feeling equilibrium off- like on a boat.  Trouble in busy environment.  - Nausea, zofran helps, taking less and less.    - Sleeping- rougher at night but taking 12 hours of naps in the day.      Exertion:  Symptoms worsen with:    Physical Activity?: yes- bending, standing up- has been sedentary    Cognitive Activity?: able to play a video game and read news on phone, up to 5 minutes      Current Symptoms:  CONCUSSION SYMPTOMS ASSESSMENT 1/25/2019   Headache or Pressure In Head 2 - mild to moderate   Upset Stomach or Throwing Up 1 - mild   Problems with Balance 2 - mild to moderate   Feeling Dizzy 1 - mild   Sensitivity to Light 4 - moderate to severe   Sensitivity to Noise 3 - moderate   Mood Changes 3 - moderate   Feeling sluggish, hazy, or foggy 3 - moderate   Trouble Concentrating, Lack of Focus 3 - moderate   Motion Sickness 3 - moderate   Vision Changes 2 - mild to moderate   Memory Problems 2 - mild to moderate   Feeling Confused 1 - mild   Neck Pain 4 - moderate to severe   Trouble Sleeping 3 - moderate   Total Number of Symptoms 15   Symptom Severity Score 37       REVIEW OF SYSTEMS:  Refer to DocFlowsheets:  Concussion symptoms  GASTROINTESTINAL:  +nausea, vomiting  MUSCULOSKELETAL: +neck pain  NEUROLOGIC: +HA, dizziness. No paresthesia or focal weakness  PSYCHIATRIC: see PHQ-9 and GAD7      PERTINENT PAST MEDICAL HISTORY    Risk Factors for Protracted Recovery:    Prior concussion history:  No      Headache History:     Prior frequency of headache: tension-type every few months    History of migraine:    Personal?: yes- trying to quit smoking    Family?: no      Mental health and developmental history:    Tends toward anxiety, uses self-coping    Teachers in middle school suspected ADHD    No past medical history on file.  Patient Active Problem List   Diagnosis     Nevus comedonicus     Dyshidrotic dermatitis     Tobacco use disorder       Pertinent social history:  Currently using alcohol: no  Currently using nicotine: 1/2 PPD  Currently using caffeine: Dr. 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   - ~ 1-2 work trips per month    Current medications:  Reconciled in chart today by clinic staff and reviewed by me.  Current Outpatient Medications   Medication     acetaminophen (TYLENOL) 325 MG tablet     ibuprofen (ADVIL/MOTRIN) 200 MG tablet     levofloxacin (LEVAQUIN) 500 MG tablet     ondansetron (ZOFRAN ODT) 4 MG ODT tab     HYDROcodone-acetaminophen (NORCO)  MG per tablet     No current facility-administered medications for this visit.        OBJECTIVE:   /74   Pulse 95   Ht 1.829 m (6')   Wt 90.7 kg (200 lb)   SpO2 97%   BMI 27.12 kg/m      Wt Readings from Last 4 Encounters:   01/25/19 90.7 kg (200 lb)   01/21/19 88.5 kg (195 lb)   10/22/18 93.4 kg (206 lb)   07/31/17 93.2 kg (205 lb 6.4 oz)       EXAM:  GENERAL: alert, oriented to person, place, time  HEAD: NC/AT  NECK:  Supple, FROM.  Paracervical mm TTP L > R, palp reproduces  HA  PSYCHIATRIC:  Irritable mood, congruent affect.  No SI.  Normal speech and thought process.  Neuro:  Strength:   Shoulder shrug (C5):5/5   Bicep (C6):5/5   Tricep (C7):5/5  Visual:  GENE: yes  Light sensitive: no  EOMI: yes  Nystagmus: no  Convergence testing: Normal (</= 6 cm)  Coordination:   Finger to Nose: normal   Rapid Alternating Movements: normal  Balance Testing:   Romberg: normal       Gait:   Walk in hallway at normal speed: Able    Walk in hallway and turn head side to side when asked: Able with increase in symptoms (neck pain)  Cognitive:  Immediate object recall:   Recall 4 objects at 5 minutes:   Reverse months of the year:   Spell world backwards: yes  Backwards number [id number]    Time spent in one-on-one evaluation and discussion with patient regarding nature of problem, course, prior treatments, and therapeutic options; >50% of this 70 minute visit  was spent in counseling, including this patient's personal symptom triggers and education thereof.    Answers for HPI/ROS submitted by the patient on 2019   MARISELA 7 TOTAL SCORE: 0  If you checked off any problems, how difficult have these problems made it for you to do your work, take care of things at home, or get along with other people?: Not difficult at all  PHQ9 TOTAL SCORE: 0

## 2019-01-25 NOTE — NURSING NOTE
Chief Complaint   Patient presents with     Head Injury     concussion w/loc 1/20/2019 - Slip / fall while ice skating       Vitals:    01/25/19 1350   BP: 106/74   Pulse: 95   SpO2: 97%   Weight: 90.7 kg (200 lb)   Height: 1.829 m (6')       Body mass index is 27.12 kg/m .      PHQ-9 SCORE 1/25/2019   PHQ-9 Total Score MyChart 0   PHQ-9 Total Score 0     MARISELA-7 SCORE 1/25/2019   Total Score 0 (minimal anxiety)   Total Score 0     CONCUSSION SYMPTOMS ASSESSMENT 1/25/2019   Headache or Pressure In Head 2 - mild to moderate   Upset Stomach or Throwing Up 1 - mild   Problems with Balance 2 - mild to moderate   Feeling Dizzy 1 - mild   Sensitivity to Light 4 - moderate to severe   Sensitivity to Noise 3 - moderate   Mood Changes 3 - moderate   Feeling sluggish, hazy, or foggy 3 - moderate   Trouble Concentrating, Lack of Focus 3 - moderate   Motion Sickness 3 - moderate   Vision Changes 2 - mild to moderate   Memory Problems 2 - mild to moderate   Feeling Confused 1 - mild   Neck Pain 4 - moderate to severe   Trouble Sleeping 3 - moderate   Total Number of Symptoms 15   Symptom Severity Score 37

## 2019-01-25 NOTE — LETTER
1/25/2019       RE: Raul Abdul  [id number] [facility] [address]     Dear Colleague,    Thank you for referring your patient, Raul Abdul, to the [facility] CONCUSSION at [facility]. Please see a copy of my visit note below.    [facility] Concussion Clinic Evaluation  January 25, 2019        Assessment:   (S06.0X1A) Concussion with loss of consciousness of 30 minutes or less, initial encounter  (primary encounter diagnosis)  (G44.201) Acute intractable tension-type headache  (M54.2) Cervicalgia  (H53.149) Visual discomfort, unspecified laterality  (R42) Dizziness    Raul Abdul is a 38 year old male who presents for concussion with loss of consciousness estimated to be about 90 seconds when he slipped and fell while skating backwards.  He states that he was helping his children learn how to skate when he fell.  He struck his posterior head per bystanders report, however he does not recall the fall.  He starts to remember being helped up but does not remember walking back to the locker room and he only vaguely recalls his initial ER visit.  His friend drove him to the [facility] ED for evaluation.  While he was there he reported having a headache and feeling dizzy, nauseous, and did vomit.  He had some pink colored nasal drips which concerned others around him for possible CSF leak.  His CT head in the ED demonstrated an active sinus infection however no acute ICP was noted.  CT cervical spine was without acute fracture or subluxation.  He was started on Augmentin for his sinus infection.  He was also given Vicodin for pain.  He presented again to the ED the following day with multiple episodes of N/V and be vomiting overnight.  He also noted movement made his pain worse.  After normal exam he was treated with Zofran and Reglan with improvement in his symptoms and discharged with Zofran.  He visited the ED a third consecutive day as well he was in the shower blowing his nose  large blood clots came out.  He called the nurse triage line who informed him he should immediately present to the emergency department.  On exam in the right nare there were 2 small friable vessels with surrounding clot on his nasal septum.  Repeat CT head was with bilateral maxillary sinus air-fluid levels.  He was educated on nasal hygiene and his antibiotic was changed to Levaquin.    Currently, headache is described as dull and constant wrapping from the posterior head  to the forehead.  He feels it is much worse with bright sunlight and direct overhead lighting.  He may be able to forget about his headache if he is distracted.  He has been taking either ibuprofen or Tylenol every 3 hours which she states helps.  And overall feels his equilibrium is off as if he is on a boat.  He has trouble in busy environments.  Zofran is helping with nausea and he is needing to take less and less of this over time.  Nighttime sleeping is impaired however this may be due to taking 12 hours worth of naps during the daytime.  He will become dizzy as if he is having a head rush when standing up.  Saline irrigation is helping his facial pressure.  He has been able to play a video game and read his nose on a phone, up to 5 minutes at a time.  Overall he has been sedentary.  CSA score today is 37/90, with the biggest issues being neck pain and light sensitivity.    Raul has never had a concussion in the past.  He has a history of infrequent tension type headaches perhaps every few months.  He developed migraines in the context of attempting to quit smoking, however currently he is at 1/2 pack/day.  He has never been formally treated for mental health issues but states he tends toward anxiety and uses self coping to manage these feelings.  He also states that teachers in middle school suspected he had ADHD but again he cites the ability to cope for any issues in this regard.  Currently, he is living at home with his wife Walter who  "is present and supportive today as well as daughters 10 and 5.  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  He states that he uses 3 screens in the course of his work.    On exam, Raul is seated bent forward and looks uncomfortable but in no distress.  Neck is supple and with full range of motion.  Paracervical musculature is tender more so on the left than on the right and palpation here reproduces his headache.  Strength, coordination, balance, and gait are normal.  While walking in the ca with his neck tilted to the side he noted an increase in his neck pain but no pathway deviation or dizziness. Oculomotor function is normal including convergence point that is reached without increased HA, dizziness, nausea.  Cognitive screening is normal.      Much of our time was spent in discussion of concussion pathophysiology, symptoms including the likely etiology of his loss of consciousness and amnesia. We discussed that typical resolution of these issues even with LOC and amnesia is between 3 and 6 weeks.  I also suspect that much of his ongoing physical symptoms may be stemming from a comorbid neck injury from the fall.  I have referred him to physical therapy to further address his HA, neck pain, and dizziness.  We looked at his latest CT scan together in detail showing him that there is no evidence for any kind of fracture or obvious \"brain damage\" and also pointed out his active sinus infection.  This was reassuring to him.  He is eager to return back and to some work,   Which I support should be earlier rather than later particularly from the standpoint of his mental health as the longer he is out of work the more work will be piling up for him.  I propose that in general he should gradually return back to all of his activities, simply taking breaks as needed to help manage his " "symptom levels.  I would like him to trial back next week at 4 hours, 5 days/week but to hold on air travel at this time due to his sinus infection.  He may advance his hours as tolerated.  Please see the AVS for a summary of our discussion regarding symptom management.    Raul should follow-up with his PCP in 1-2 weeks for recheck and clearance for return to air travel should his sinusitis be adequately resolved.  He may follow-up here in 4-6 weeks if clinically indicated, though due to lower risk for a prolonged course of symptoms will not pre-arrange follow up at this time.      Plan:  1. Biggest concern of pt addressed: HA    2. Work restrictions:  a.  May return to work as of 1/30/19 on a trial basis with the following restrictions:  i. Work- Up to 4 hrs 5 days a week  1. May advance hours as tolerated.  b. No air travel at this time.    3. Activity recommendations- Light, low impact aerobic activity, advance as tolerated    4. Medications:  a. Discontinue norco    5. Referral to:   a. Physical Therapy:   1. Indications/Goals: evaluation and treatment including but not limited to neck pain, HA, dizziness    6. Follow up with PCP in 1-2 wks, here PRN.    7. Letters written today for:  work    AVS Instructions:  Ilya will contact you to schedule PT.  Please follow-up with your PCP in 1 week.    ~ You WILL get better~    GENERAL ADVICE  ~ Gradually ease back into your usual activities.  ~ Rest as needed to help your symptoms go away.   - Consider pairing 50 minutes of activity with 10 minutes of rest.  ~ Allow yourself more time for activities.  ~ Write things down.  At home, at work, whenever there is something that you should remember, even it is simple.    SCREENS  ~ Change settings on your phone and computer using the \"Blue Light Filter\" (Night Shift on all [employer] products)   ~ The goal is making screens more yellow and less blue.     ~ If this is not an option you can download this program: [employer], to " adjust your screen resolution.  ~ Turn screen brightness down  ~ Increase font size    HEADACHE  ~ Take tylenol (1000 mg) three times a day as needed  ~ Take ibuprofen (600 mg) three times a day as needed (take with food)   - reduce as able.    NECK PAIN  ~ Ice or Heat are good~  ~ Massage is ok if it doesn't trigger more symptoms~  ~ Gentle stretches and range-of-motion are helpful    FATIGUE  ~ Daily exercise is strongly encouraged.  Start with a 10 min walk and increase the time as tolerated until you are walking 30 minutes per day.      ANXIETY OR MOOD SWINGS:  ~ If you are irritable or anxious, take a break in a quiet room.  ~ Try using the free Calm stacy for guided breathing and mindfulness/meditation.  ~ Explore Incoming Media (https://www.headspace.com) for free and easy-to-use meditation guidance.    Diet:  - In principle incorporate more protein, lots of veggies, some fruit, whole grains.    - Less sweets and saturated fat.   - Mediterranean Diet is an easy-to-follow example.  ~ Drink plenty of water throughout the day (8-10 glasses per day)      HPI  Date of injury: 1/20/19  Mechanism: Fall while skating backwards, + LOC.  + amnesia.    helping his children learn how to skate. Posterior head strike, brief LOC ~90 sec.  No recall of fall.  Starts to remember being helped up but not walking back to locker room.  Vague recall of 1st ED visit.    Friend drove to [facility] ED for eval. Patient reports fall with while skating backwards with likely LOC. He is amnesic to events. Reports HA and dizzy. Nausea, vomiting resolved. No blurred vision, neuro intact. No drainage from nose currently however he did mention that he had pink color nasal gtt.    CT head w/ active sinus infection, no acute ICP.  CT cervical spine w/o acute Fx or subluxation.  Started on augmentin    ED visit next day 1/21/19:   Patient had been discharged home with oral analgesics, including Vicodin.  Patient attempted to take 1 of those tablets,  however did not have any nausea medicine either, and has been with multiple episodes of (NBNB) vomiting throughout the overnight hours.  Any sort of movement is making the pain worse.  Normal exam, Tx zofran and reglan and felt better, sent home with zofran.    ED visit next day 1/22/19:  This afternoon he was taking a shower, a hot shower, and reports blowing his nose and blowing out mucousy blood clots the size of a pecan.  He was told by the nurse triage line to come immediately to the emergency department.  right side nare there are 2 very small friable vessels with clot on the nasal septum.  CT head w/ bilat maxillary sinus air-fluid levels.  Nasal hygiene recs, changed to Levaquin    HA: posterior and wraps to front, dull.  Constant.  Much worse with bright sunlight and direct overhead lighting.  Might be able to forget about it if distracted.    - Every 3 hours taking either 600mg ibuprofen or 650mg of Tylenol.  Helping.    - starting to feel more facial pressure.  Saline irrigation helps.    Dizzy- head rush with standing up.  Feeling equilibrium off- like on a boat.  Trouble in busy environment.  - Nausea, zofran helps, taking less and less.    - Sleeping- rougher at night but taking 12 hours of naps in the day.      Exertion:  Symptoms worsen with:    Physical Activity?: yes- bending, standing up- has been sedentary    Cognitive Activity?: able to play a video game and read news on phone, up to 5 minutes      Current Symptoms:  CONCUSSION SYMPTOMS ASSESSMENT 1/25/2019   Headache or Pressure In Head 2 - mild to moderate   Upset Stomach or Throwing Up 1 - mild   Problems with Balance 2 - mild to moderate   Feeling Dizzy 1 - mild   Sensitivity to Light 4 - moderate to severe   Sensitivity to Noise 3 - moderate   Mood Changes 3 - moderate   Feeling sluggish, hazy, or foggy 3 - moderate   Trouble Concentrating, Lack of Focus 3 - moderate   Motion Sickness 3 - moderate   Vision Changes 2 - mild to moderate    Memory Problems 2 - mild to moderate   Feeling Confused 1 - mild   Neck Pain 4 - moderate to severe   Trouble Sleeping 3 - moderate   Total Number of Symptoms 15   Symptom Severity Score 37       REVIEW OF SYSTEMS:  Refer to DocFlowsheets:  Concussion symptoms  GASTROINTESTINAL: +nausea, vomiting  MUSCULOSKELETAL: +neck pain  NEUROLOGIC: +HA, dizziness. No paresthesia or focal weakness  PSYCHIATRIC: see PHQ-9 and GAD7      PERTINENT PAST MEDICAL HISTORY    Risk Factors for Protracted Recovery:    Prior concussion history:  No      Headache History:     Prior frequency of headache: tension-type every few months    History of migraine:    Personal?: yes- trying to quit smoking    Family?: no      Mental health and developmental history:    Tends toward anxiety, uses self-coping    Teachers in middle school suspected ADHD    No past medical history on file.  Patient Active Problem List   Diagnosis     Nevus comedonicus     Dyshidrotic dermatitis     Tobacco use disorder       Pertinent social history:  Currently using alcohol: no  Currently using nicotine: 1/2 PPD  Currently using caffeine: Dr. 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   - ~ 1-2 work trips per month    Current medications:  Reconciled in chart today by clinic staff and reviewed by me.  Current Outpatient Medications   Medication     acetaminophen (TYLENOL) 325 MG tablet     ibuprofen (ADVIL/MOTRIN) 200 MG tablet     levofloxacin (LEVAQUIN) 500 MG tablet     ondansetron (ZOFRAN ODT) 4 MG ODT tab     HYDROcodone-acetaminophen (NORCO)  MG per tablet     No current facility-administered medications for this visit.        OBJECTIVE:   /74   Pulse 95   Ht 1.829 m (6')   Wt 90.7 kg (200 lb)   SpO2 97%   BMI 27.12 kg/m       Wt Readings from Last 4  Encounters:   19 90.7 kg (200 lb)   19 88.5 kg (195 lb)   10/22/18 93.4 kg (206 lb)   17 93.2 kg (205 lb 6.4 oz)       EXAM:  GENERAL: alert, oriented to person, place, time  HEAD: NC/AT  NECK:  Supple, FROM.  Paracervical mm TTP L > R, palp reproduces HA  PSYCHIATRIC:  Irritable mood, congruent affect.  No SI.  Normal speech and thought process.  Neuro:  Strength:   Shoulder shrug (C5):5/5   Bicep (C6):5/5   Tricep (C7):5/5  Visual:  GENE: yes  Light sensitive: no  EOMI: yes  Nystagmus: no  Convergence testing: Normal (</= 6 cm)  Coordination:   Finger to Nose: normal   Rapid Alternating Movements: normal  Balance Testing:   Romberg: normal       Gait:   Walk in hallway at normal speed: Able    Walk in hallway and turn head side to side when asked: Able with increase in symptoms (neck pain)  Cognitive:  Immediate object recall:   Recall 4 objects at 5 minutes:   Reverse months of the year:   Spell world backwards: yes  Backwards number [id number]    Time spent in one-on-one evaluation and discussion with patient regarding nature of problem, course, prior treatments, and therapeutic options; >50% of this 70 minute visit  was spent in counseling, including this patient's personal symptom triggers and education thereof.    Answers for HPI/ROS submitted by the patient on 2019   MARISELA 7 TOTAL SCORE: 0  If you checked off any problems, how difficult have these problems made it for you to do your work, take care of things at home, or get along with other people?: Not difficult at all  PHQ9 TOTAL SCORE: 0      Again, thank you for allowing me to participate in the care of your patient.      Sincerely,    Kristopher Sánchez PA-C

## 2019-01-25 NOTE — LETTER
January 25, 2019    Workability Form for Raul Abdul, 1980 who is under my care for a concussion that occurred on 1/20/2018.       --- May return to work as of 1/30/19 on a trial basis with the following restrictions:  1. Work- Up to 4 hrs 5 days a week  a. May advance hours as tolerated.  2. No air travel at this time.    Full or partial days missed due to post-concussion symptoms and follow up appointments should be medically excused.      Follow up evaluation and revision of recommendations to occur on .    Please feel free to contact me at the number below with any questions or concerns.    Sincerely,            Kristopher Sánchez PA-C  Concussion Clinic  [facility]  Clinic nurse line: [phone]  Fax: [phone]

## 2019-01-26 ASSESSMENT — ANXIETY QUESTIONNAIRES: GAD7 TOTAL SCORE: 0

## 2019-11-04 ENCOUNTER — HEALTH MAINTENANCE LETTER (OUTPATIENT)
Age: 39
End: 2019-11-04

## 2019-11-18 ENCOUNTER — OFFICE VISIT (OUTPATIENT)
Dept: FAMILY MEDICINE | Facility: CLINIC | Age: 39
End: 2019-11-18
Payer: COMMERCIAL

## 2019-11-18 VITALS
DIASTOLIC BLOOD PRESSURE: 72 MMHG | SYSTOLIC BLOOD PRESSURE: 110 MMHG | HEART RATE: 92 BPM | WEIGHT: 205 LBS | BODY MASS INDEX: 28.7 KG/M2 | TEMPERATURE: 97.8 F | OXYGEN SATURATION: 96 % | HEIGHT: 71 IN

## 2019-11-18 DIAGNOSIS — D23.21 DERMOID CYST OF RIGHT EAR: Primary | ICD-10-CM

## 2019-11-18 DIAGNOSIS — Z13.1 SCREENING FOR DIABETES MELLITUS: ICD-10-CM

## 2019-11-18 DIAGNOSIS — G47.10 HYPERSOMNIA: ICD-10-CM

## 2019-11-18 DIAGNOSIS — Z13.220 LIPID SCREENING: ICD-10-CM

## 2019-11-18 DIAGNOSIS — R68.82 DECREASED LIBIDO: ICD-10-CM

## 2019-11-18 DIAGNOSIS — Z13.29 SCREENING FOR THYROID DISORDER: ICD-10-CM

## 2019-11-18 DIAGNOSIS — D22.5 NEVUS OF BACK: ICD-10-CM

## 2019-11-18 DIAGNOSIS — Z72.0 TOBACCO ABUSE: ICD-10-CM

## 2019-11-18 PROCEDURE — 99215 OFFICE O/P EST HI 40 MIN: CPT | Performed by: FAMILY MEDICINE

## 2019-11-18 ASSESSMENT — ENCOUNTER SYMPTOMS
EYE PAIN: 0
CONSTIPATION: 1
PALPITATIONS: 0
PARESTHESIAS: 0
HEMATOCHEZIA: 0
ARTHRALGIAS: 0
SHORTNESS OF BREATH: 0
HEMATURIA: 0
NAUSEA: 0
APNEA: 1
FREQUENCY: 0
DIZZINESS: 0
FEVER: 0
WEAKNESS: 0
HEARTBURN: 0
COUGH: 0
CHILLS: 0
HEADACHES: 0
DIARRHEA: 1
ABDOMINAL PAIN: 0
JOINT SWELLING: 0
MYALGIAS: 0
DYSURIA: 0
NERVOUS/ANXIOUS: 0
SORE THROAT: 0

## 2019-11-18 ASSESSMENT — MIFFLIN-ST. JEOR: SCORE: 1867

## 2019-11-18 NOTE — NURSING NOTE
"Initial /72 (BP Location: Left arm, Cuff Size: Adult Large)   Pulse 92   Temp 97.8  F (36.6  C) (Tympanic)   Ht 1.803 m (5' 11\")   Wt 93 kg (205 lb)   SpO2 96%   BMI 28.59 kg/m   Estimated body mass index is 28.59 kg/m  as calculated from the following:    Height as of this encounter: 1.803 m (5' 11\").    Weight as of this encounter: 93 kg (205 lb). .    "

## 2019-11-18 NOTE — PROGRESS NOTES
SUBJECTIVE:   CC: Raul Abdul is an 39 year old male who presents for preventative health visit.   *decline flu shot    Healthy Habits:    Getting at least 3 servings of Calcium per day:  Yes    Bi-annual eye exam:  Yes    Dental care twice a year:  Yes    Diet:  Regular (no restrictions)    Frequency of exercise:  None    Taking medications regularly:  No    Barriers to taking medications:  Not applicable    Medication side effects:  Not applicable    PHQ-2 Total Score:    Additional concerns today:  Yes          *derm referral for mole on waist and cyst in ear  *vasectomy consult  *hernia mesh concerns      Today's PHQ-2 Score:   PHQ-2 ( 1999 [employer]) 1/25/2019   Q1: Little interest or pleasure in doing things 0   Q2: Feeling down, depressed or hopeless 0   PHQ-2 Score 0   Q1: Little interest or pleasure in doing things Not at all   Q2: Feeling down, depressed or hopeless Not at all   PHQ-2 Score 0       Abuse: Current or Past(Physical, Sexual or Emotional)- declined  Do you feel safe in your environment? declined        Social History     Tobacco Use     Smoking status: Current Every Day Smoker     Packs/day: 0.50     Years: 12.00     Pack years: 6.00     Types: Cigarettes     Smokeless tobacco: Never Used   Substance Use Topics     Alcohol use: Yes     Alcohol/week: 0.0 standard drinks     Comment: Socially, rare. 6 beers in a month     *declined alcohol questionnaire    Alcohol Use 10/22/2018   Prescreen: >3 drinks/day or >7 drinks/week? No   Prescreen: >3 drinks/day or >7 drinks/week? -       Last PSA:   PSA   Date Value Ref Range Status   05/16/2014 1.10 0 - 4 ug/L Final       Reviewed orders with patient. Reviewed health maintenance and updated orders accordingly - Yes      Reviewed and updated as needed this visit by clinical staff  Tobacco  Allergies  Meds  Med Hx  Surg Hx  Fam Hx  Soc Hx        Reviewed and updated as needed this visit by Provider        Past Medical History:   Diagnosis Date      Tobacco abuse        Past Surgical History:   Procedure Laterality Date     AS REPAIR EPIGASTRIC HERNIA,REDUC         Family History   Problem Relation Age of Onset     Diabetes Mother      Hypertension Mother      Atrial fibrillation Mother      Diabetes Father      Hypertension Father      Prostate Cancer Father      Heart Disease Father      Cancer Brother 40        Skin cancer, unsure which type       Social History     Tobacco Use     Smoking status: Current Every Day Smoker     Packs/day: 1.00     Years: 20.00     Pack years: 20.00     Types: Cigarettes     Smokeless tobacco: Never Used   Substance Use Topics     Alcohol use: Yes     Alcohol/week: 0.0 standard drinks     Comment: Socially, rare. 6 beers in a month     Current Outpatient Medications   Medication     acetaminophen (TYLENOL) 325 MG tablet     ibuprofen (ADVIL/MOTRIN) 200 MG tablet     No current facility-administered medications for this visit.        1. Establish care    2. 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  Right ear concerns.  States of intermittent cyst swelling.  Noticed boil and redness which occurs every 3 months.  No pain issues.  Ongoing for the past 10 years.  No issues with hearing.    3. Mole of back:  Ongoing for the past 5 years.  No change in size or color.  No history of skin cancer.     4. Sleep concerns:  States of excessive snoring.  Chronic condition.  States of insomnia.  Feels like he can easily fall asleep.  Wife complains that he snores.    5. Decrease sex drive:  Ongoing for the past 3-4 years.  Patient has a hard time maintaining erection.  Patient took viagra    6. Tobacco abuse: Patient is not ready to quit smoking.     6. History of hernia repair:  Around 2385-2831.  No pain issues.  States of intermittent bowel changes.  Ongoing for the past 1-2 years.  States of intermittent diarrhea and constipation.  States of lack of fiber.       Review of Systems   Constitutional: Negative for chills and fever.   HENT:  "Negative for congestion, ear pain, hearing loss and sore throat.         Cyst involving right ear lobule   Eyes: Negative for pain and visual disturbance.   Respiratory: Positive for apnea (nocturnal). Negative for cough and shortness of breath.         States of snoring   Cardiovascular: Negative for chest pain, palpitations and peripheral edema.   Gastrointestinal: Positive for constipation (intermittent) and diarrhea (intermittent). Negative for abdominal pain, heartburn, hematochezia and nausea.   Genitourinary: Negative for discharge, dysuria, frequency, genital sores, hematuria, impotence and urgency.        States of decreased sex drive   Musculoskeletal: Negative for arthralgias, joint swelling and myalgias.   Skin: Negative for rash.        Mole on right lower back   Neurological: Negative for dizziness, weakness, headaches and paresthesias.   Psychiatric/Behavioral: Negative for mood changes. The patient is not nervous/anxious.          OBJECTIVE:   /72 (BP Location: Left arm, Cuff Size: Adult Large)   Pulse 92   Temp 97.8  F (36.6  C) (Tympanic)   Ht 1.803 m (5' 11\")   Wt 93 kg (205 lb)   SpO2 96%   BMI 28.59 kg/m      Physical Exam  Constitutional:       General: He is not in acute distress.     Appearance: He is well-developed.   HENT:      Head: Normocephalic and atraumatic.      Ears:      Comments: Approximately 1 cm cystic nodule involving the lobule of right ear     Nose: Nose normal.   Eyes:      Conjunctiva/sclera: Conjunctivae normal.   Neck:      Musculoskeletal: Normal range of motion.      Trachea: No tracheal deviation.   Cardiovascular:      Rate and Rhythm: Normal rate and regular rhythm.      Heart sounds: Normal heart sounds.   Pulmonary:      Effort: Pulmonary effort is normal.      Breath sounds: No wheezing.   Abdominal:      Comments: Well healed surgical scar   Musculoskeletal: Normal range of motion.   Skin:     Findings: No erythema or rash.   Neurological:      Mental " "Status: He is alert and oriented to person, place, and time.   Psychiatric:         Behavior: Behavior normal.       ASSESSMENT/PLAN:     1. Dermoid cyst of right ear  Involving the lobule of right ear  - OTOLARYNGOLOGY REFERRAL    2. Tobacco abuse  Patient declines treatment at this time.     3. Hypersomnia  Concerning for MARLYS  - SLEEP EVALUATION & MANAGEMENT REFERRAL - ADULT -[facility] - Kady Sweet  [phone] (Age 15 and up); Future    4. Decreased libido  Consider treating for ED if testosterone levels are normal.  - Testosterone, total; Future    5. Lipid screening  - Lipid panel reflex to direct LDL Fasting; Future    6. Screening for diabetes mellitus  - Glucose; Future    7. Screening for thyroid disorder  - TSH with free T4 reflex; Future    8. Nevus of back  Patient reassurance.  Discussed ABCDEs of melanoma.     I spent 45 minutes with patient of which 50% was spent counseling and coordinating patient's care as well as discussing patient's plan of care.      Estimated body mass index is 28.59 kg/m  as calculated from the following:    Height as of this encounter: 1.803 m (5' 11\").    Weight as of this encounter: 93 kg (205 lb).     Weight management plan: Discussed healthy diet and exercise guidelines     reports that he has been smoking cigarettes. He has a 6.00 pack-year smoking history. He has never used smokeless tobacco.  Tobacco Cessation Action Plan: Information offered: Patient not interested at this time    Counseling Resources:  ATP IV Guidelines  Pooled Cohorts Equation Calculator  FRAX Risk Assessment  ICSI Preventive Guidelines  Dietary Guidelines for Americans, 2010  USDA's MyPlate  ASA Prophylaxis  Lung CA Screening    Ross Alberto, DO  [facility]  "

## 2019-11-18 NOTE — PATIENT INSTRUCTIONS
Omaira Abdul,    Thank you for allowing [facility] to manage your care.    I ordered some fasting blood work, please go to the laboratory to get your laboratory studies.  Please call [phone] to scheduled your laboratory appointment.     I made a sleep study and ENT referral, they will be in 1-2 weeks to set up your appointment.  If you do not hear from them, please call the specialty number on your after visit.     Please allow 1-2 business days for our office to call you in regards to your laboratory/radiological studies.  If not done so, I encourage you to login into YOUnite ([email or web address] to review your results as well.     If you have any questions or concerns, please feel free to call us at [phone].    Sincerely,    Dr. Alberto    Did you know?  You can schedule an e-Visit for certain simple non-emergent issue for your convenience.  To learn more about or start an eVisit, simply login to [employer], click  Visits  on top banner, click  Start a Virtual Visit  drop down, and click  Symptom-Specific E-Visit

## 2020-02-16 ENCOUNTER — HEALTH MAINTENANCE LETTER (OUTPATIENT)
Age: 40
End: 2020-02-16

## 2020-05-26 ENCOUNTER — TELEPHONE (OUTPATIENT)
Dept: FAMILY MEDICINE | Facility: CLINIC | Age: 40
End: 2020-05-26

## 2020-05-26 NOTE — TELEPHONE ENCOUNTER
::  Please attempt to call patient again to cancel his appointment with K. Kehr, PA-C for tomorrow and reschedule with Dr. Jerry Parker for Thursday (if still has appts available)  I have left him detailed msgs.  See below.  Mónica Dunaway RN

## 2020-05-26 NOTE — TELEPHONE ENCOUNTER
I spoke with Dr. Jerry Parker.  He states that he is able to see him in available slot on Thursday (long appointment please)  Will check mole on back and remove if no concerns after examining it.  As for the cyst on lobe of ear he can examine it but will probably need to go to ENT or dermatology for consult/removal.   I did leave patient this message on his voicemail along with my call back number til 4:15pm today (had already given him main scheduling number)  Dr. Jerry Parker does have available appts this Thursday at this time.  Appointment should be at least 30 minutes (per Dr. Jerry Parker)  K. Kehr, PA-C does not do removals so will need to cancel appointment with her for the removals.  Mónica BUCIO, [phone]

## 2020-05-26 NOTE — TELEPHONE ENCOUNTER
Patient has appointment with K. Kehr, PA-C tomorrow for the recurrent sebaceous cyst on back side of right ear lob and the mole (slightly raised pencil eraser size) on back on his waistline area.  He is wanting them both removed tomorrow at appointment because he just lost his job and will have no insurance as of Friday.  He states the sebaceous cyst is recurrent about every 3 months or so and has been long term.  Mole on back has been present 4+ years; no change in size or color but is an annoyance because of location; worse in summer with increased heat/sweating.  Has a long appointment scheduled for tomorrow but states scheduling person told him to check with provider to see if needs more time for both procedures (had been told that she has another opening right after his).  I did let him know that Deena may not be able to remove the mole or lauren the cyst at this visit.  She has not seen the patient to examine.  11/18/19 RUPERT Alberto she states was referring to dermatology.  He states she referred him to otolaryngology for the ear cyst but didn't go.  Please advise.  1.  Do you want more time for appointment?  2.  Do you feel you are able to do these procedures?  (insurance lapses on Friday)

## 2020-05-26 NOTE — TELEPHONE ENCOUNTER
Forwarding to Dr. Jerry Parker to see if would be willing to see patient this week for mole and cyst removal (see notes below).  Was scheduled by someone on K. Kehr's schedule for tomorrow; she doesn't do cyst removals.  He lost his job.  Insurance lapses on Friday.  Please advise.  Mónica Dunaway, RON    3:20pm:  I did call patient and let him know via voicemail that K. Kehr, PA-C does not do these cyst removals and that I am checking with alternate provider but at this time unsure if able to get appointment yet this week for possible removal.  Left his rn's call back number Mónica Dunaway, RN

## 2020-05-26 NOTE — TELEPHONE ENCOUNTER
Patient is calling to make sure that there is enough time for his appointment tomorrow.  He is stating that he wanted to book more time to make sure that all of it gets done.  Please call to advise.  Thank you

## 2020-05-26 NOTE — TELEPHONE ENCOUNTER
I called the patient @[phone] and LM stating that I can reschedule his 5/27/20 appointment with Deena to Dr. Parker's schedule on 5/28/20.  My direct line given [phone].  Gwendolyn Garcia,

## 2020-05-28 ENCOUNTER — OFFICE VISIT (OUTPATIENT)
Dept: FAMILY MEDICINE | Facility: CLINIC | Age: 40
End: 2020-05-28
Payer: COMMERCIAL

## 2020-05-28 VITALS
TEMPERATURE: 97.6 F | HEART RATE: 89 BPM | SYSTOLIC BLOOD PRESSURE: 102 MMHG | HEIGHT: 72 IN | OXYGEN SATURATION: 98 % | BODY MASS INDEX: 28.04 KG/M2 | DIASTOLIC BLOOD PRESSURE: 68 MMHG | WEIGHT: 207 LBS

## 2020-05-28 DIAGNOSIS — N52.9 ERECTILE DYSFUNCTION, UNSPECIFIED ERECTILE DYSFUNCTION TYPE: Primary | ICD-10-CM

## 2020-05-28 DIAGNOSIS — D22.9 BENIGN MOLE: ICD-10-CM

## 2020-05-28 PROCEDURE — 11300 SHAVE SKIN LESION 0.5 CM/<: CPT | Performed by: FAMILY MEDICINE

## 2020-05-28 PROCEDURE — 99213 OFFICE O/P EST LOW 20 MIN: CPT | Mod: 25 | Performed by: FAMILY MEDICINE

## 2020-05-28 RX ORDER — SILDENAFIL 50 MG/1
50 TABLET, FILM COATED ORAL DAILY PRN
Qty: 50 TABLET | Refills: 5 | Status: SHIPPED | OUTPATIENT
Start: 2020-05-28 | End: 2021-09-01

## 2020-05-28 ASSESSMENT — MIFFLIN-ST. JEOR: SCORE: 1879.01

## 2020-05-28 NOTE — PROGRESS NOTES
SUBJECTIVE: Raul Abdul is 40 year old male who presents for evaluation of 1 skin lesions which are located on his  Right lower back.   The lesion(s) has(have) been present for 3-4 year(s). It was checked out by dermatology and he was told it was benign. It has not grown.   The patient reports that there are symptoms related to these lesions.   It is right on the waist band it gets irritated when swims or when he sweats.    OBJECTIVE:  The lesion of most concern is located on the back. The lesion is (A) symmetric, (B) has a regular border, (C) has 1 colors, (D) and has a diameter of 3 by 3mm, and IS elevated.       Assessment: benign but symptomatic nevus  PLAN:   The lesion was removed today by a shave biopsy technique with local anasthesia. Bleeding was controlled with pressure and Drysol. Antibiotic ointment was applied and  then a dressing was applied. The patient was instructed to keep the wound moist with antibiotic ointment and covered with a bandage. The patient was instructed to remove the bandage daily before showers and then to redress as done previously. Patient was instructed to avoid submersion of the wound and to be alert for any signs of cutaneous infection.          --------------------------------------------------------------------------------------------------------------------------------------    SUBJECTIVE:  Raul Abdul is a 40 year old male who presents with concerns over a right  ear cyst that has been present for over 10 year(s).  He had one on the left ear removed years ago.     Every 3 month(s) or so it flares up and caused problems.  It drains thick yellow or white malodorous fluid.     He was a high school wresteled      Past Medical History:   Diagnosis Date     Tobacco abuse      Current Outpatient Medications   Medication Sig Dispense Refill     acetaminophen (TYLENOL) 325 MG tablet Take 650 mg by mouth every 6 hours       ibuprofen (ADVIL/MOTRIN) 200 MG tablet Take 600 mg by  "mouth every 6 hours as needed for mild pain       Social History     Tobacco Use     Smoking status: Current Every Day Smoker     Packs/day: 1.00     Years: 20.00     Pack years: 20.00     Types: Cigarettes     Smokeless tobacco: Never Used   Substance Use Topics     Alcohol use: Yes     Alcohol/week: 0.0 standard drinks     Comment: Socially, rare. 6 beers in a month       ROS:       OBJECTIVE:  /68   Pulse 89   Temp 97.6  F (36.4  C) (Tympanic)   Ht 1.816 m (5' 11.5\")   Wt 93.9 kg (207 lb)   SpO2 98%   BMI 28.47 kg/m     EXAM:  The right TM is normal: no effusions, no erythema, and normal landmarks     The right auditory canal is normal and without drainage, edema or erythema    There is a marble size subcutaneous lesion in the posterior aspect of the pinna. It is non tender. There is no erythema or warmth to touch. It is mobile    ASSESSMENT:  Subcutaneous ear cyst which has caused recurrent symptom(s).     PLAN:  The patient was referred to [facility] and I spoke with Dr Hebert and got him an appointment(s) tomorrow at 3:15 at [facility].    --------------------------------------------------------------------------------------------------------------------------------------  SUBJECTIVE:  Raul Abdul is a 40 year old male who is here today with concerns of Erectile Dysfunction (ED).     He reports that he has had difficulty obtaining an erection.  He reports that he has had difficulty maintaining an erection.    This has been going on for a few  years    He reports a lowered libido.  He  denies any serious marital problems at this time.    He has tried the following medications in the past:  Viagra  20 mg did not work very well.   He tried 2 or 3 tabs and found that much more effective        Past Medical, social, family histories, medications, and allergies reviewed and updated    OBJECTIVE:  /68   Pulse 89   Temp 97.6  F (36.4  C) (Tympanic)   Ht 1.816 m (5' 11.5\")   Wt 93.9 kg (207 " lb)   SpO2 98%   BMI 28.47 kg/m         ASSESSMENT:  Erectile Dysfunction    PLAN:   We will check the following labs: Free and Total Testosterone, TSH, HgbA1C, LFT's  We discussed the medication options for treatment. We discussed the difference in the duration of action of Viagra, Levitra and Cialis. We discussed the dosing and this was printed for the patient as a letter (see below Erectile Dysfunction Medication). We also discussed the potential side effects.  The patient was most interested in trying the sildenafil againb.      The patient is not taking nitrates, and denies he has access to nitrates in any form at any time. I have counseled him that taking any erectile dysfunction medication with nitrates of any form can cause death. Additionally,  erectile dysfunction medication serum concentrations can be increased by the following: cimetidine, erythromycin, itraconazole or ketoconazole. This patient does not take these drugs, but I have counseled him to avoid Viagra if he does take any of these.

## 2020-05-29 ENCOUNTER — OFFICE VISIT (OUTPATIENT)
Dept: OTOLARYNGOLOGY | Facility: CLINIC | Age: 40
End: 2020-05-29
Payer: COMMERCIAL

## 2020-05-29 VITALS
SYSTOLIC BLOOD PRESSURE: 124 MMHG | BODY MASS INDEX: 28.52 KG/M2 | WEIGHT: 207.4 LBS | HEART RATE: 98 BPM | OXYGEN SATURATION: 97 % | DIASTOLIC BLOOD PRESSURE: 73 MMHG | TEMPERATURE: 97.8 F

## 2020-05-29 DIAGNOSIS — L72.3 SEBACEOUS CYST: Primary | ICD-10-CM

## 2020-05-29 PROCEDURE — 99203 OFFICE O/P NEW LOW 30 MIN: CPT | Mod: 25 | Performed by: OTOLARYNGOLOGY

## 2020-05-29 PROCEDURE — 11442 EXC FACE-MM B9+MARG 1.1-2 CM: CPT | Performed by: OTOLARYNGOLOGY

## 2020-05-29 NOTE — LETTER
5/29/2020         RE: Raul Abdul  [id number] [facility] [address]        Dear Colleague,    Thank you for referring your patient, Raul Abdul, to the [facility]. Please see a copy of my visit note below.    I am seeing this patient in consultation for skin lesion at the request of the provider Dr. Jerry Parker.      Chief Complaint - skin lesion    History of Present Illness - Raul Abdul is a 40 year old male presents with a lesion on the right posterior ear lobe. The patient has noticed for approximately years. It can enlarge and get much bigger. It currently isn't inflamed or infected. The patient had a left one removed years ago. No lumps or swollen glands in the neck. Current smoker.     Past Medical History -   Patient Active Problem List   Diagnosis     Nevus comedonicus     Dyshidrotic dermatitis     Tobacco use disorder       Current Medications -   Current Outpatient Medications:      acetaminophen (TYLENOL) 325 MG tablet, Take 650 mg by mouth every 6 hours, Disp: , Rfl:      ibuprofen (ADVIL/MOTRIN) 200 MG tablet, Take 600 mg by mouth every 6 hours as needed for mild pain, Disp: , Rfl:      sildenafil (VIAGRA) 50 MG tablet, Take 1 tablet (50 mg) by mouth daily as needed (Patient not taking: Reported on 5/29/2020), Disp: 50 tablet, Rfl: 5    Allergies - No Known Allergies    Social History -   Social History     Socioeconomic History     Marital status:      Spouse name: None     Number of children: 2     Years of education: None     Highest education level: None   Occupational History     Occupation:    Social Needs     Financial resource strain: None     Food insecurity     Worry: None     Inability: None     Transportation needs     Medical: None     Non-medical: None   Tobacco Use     Smoking status: Current Every Day Smoker     Packs/day: 1.00     Years: 20.00     Pack years: 20.00     Types: Cigarettes     Smokeless tobacco: Never Used    Substance and Sexual Activity     Alcohol use: Yes     Alcohol/week: 0.0 standard drinks     Comment: Socially, rare. 6 beers in a month     Drug use: No     Sexual activity: Yes     Partners: Female   Lifestyle     Physical activity     Days per week: None     Minutes per session: None     Stress: None   Relationships     Social connections     Talks on phone: None     Gets together: None     Attends Faith service: None     Active member of club or organization: None     Attends meetings of clubs or organizations: None     Relationship status: None     Intimate partner violence     Fear of current or ex partner: None     Emotionally abused: None     Physically abused: None     Forced sexual activity: None   Other Topics Concern     Parent/sibling w/ CABG, MI or angioplasty before 65F 55M? Not Asked   Social History Narrative     None       Family History -   Family History   Problem Relation Age of Onset     Diabetes Mother      Hypertension Mother      Atrial fibrillation Mother      Diabetes Father      Hypertension Father      Prostate Cancer Father      Heart Disease Father      Cancer Brother 40        Skin cancer, unsure which type       Review of Systems - As per HPI and PMHx, otherwise 7 system review of the head and neck negative.    Physical Exam  /73   Pulse 98   Temp 97.8  F (36.6  C) (Oral)   Wt 94.1 kg (207 lb 6.4 oz)   SpO2 97%   BMI 28.52 kg/m    General - The patient is in no distress.  Alert and oriented x3, answers questions and cooperates with examination appropriately.   Voice and Breathing - The patient was breathing comfortably without the use of accessory muscles. There was no wheezing, stridor, or stertor.  The patients voice was clear and strong.  Skin - examination of the lesion reveals an approximate 2 cm, raised, well-circumscribed lesion on the posterior aspect of the right earlobe. It is a sebaceous cyst. No infection. No ulceration of bleeding. No other lesions  noted.  Eyes - Extraocular movements intact. Sclera were not icteric or injected, conjunctiva were pink and moist.  Neurologic - Cranial nerves II-XII are grossly intact. Specifically, the facial nerve is intact, House-Brackmann grade 1 of 6. Facial sensation is intact and symmetric.  Neck -  Soft. Non-tender. Palpation of the occipital, submental, submandibular, internal jugular chain, and supraclavicular nodes did not demonstrate any abnormal lymph nodes or masses. The parotid glands were without masses. Palpation of the thyroid was soft and smooth, with no nodules or goiter appreciated.  The trachea was midline.    Procedure:    I explained the risk, benefits, and alteratives to skin sebaceous cyst excision. The patient agreed and wished to proceed.  I cleansed the skin of the right earlobe with alcohol.  I injected the lesion with 2% lidocaine, 1:100,000 epinephrine, 2 ml.  I then used Betadine to clean off the entire ear and right earlobe.  I made an elliptical incision including some skin that was redundant overlying the posterior aspect of the right earlobe.  I then used an iris scissors overlying the capsule of the sebaceous cyst.  I dissected on the cyst in all directions and was able to remove it.  Part of the capsule did tear open and some keratin debris spilled out but not into the wound.  I was able to get the entire sebaceous cyst free and out.  I washed the wound with saline.  I then closed the incision with 2 simple interrupted buried 4-0 Vicryl sutures, and simple interrupted 5-0 fast sutures on the skin surface.  Bacitracin was applied.    A/P - Raul PRIETO Chantell is a 40 year old male with a right posterior ear lobule sebaceous cyst.  This was excised in its entirety.  I closed the skin with dissolvable sutures.  Wound care discussed including placing antibiotic ointment on this multiple times a day, avoiding sun, and watching for signs symptoms of infection.  If he develops any he should  call.      Justin Hebert MD  Otolaryngology  [facility]      Again, thank you for allowing me to participate in the care of your patient.        Sincerely,        Justin Hebert MD

## 2020-05-29 NOTE — PROGRESS NOTES
I am seeing this patient in consultation for skin lesion at the request of the provider Dr. Jerry Parker.      Chief Complaint - skin lesion    History of Present Illness - Raul Abdul is a 40 year old male presents with a lesion on the right posterior ear lobe. The patient has noticed for approximately years. It can enlarge and get much bigger. It currently isn't inflamed or infected. The patient had a left one removed years ago. No lumps or swollen glands in the neck. Current smoker.     Past Medical History -   Patient Active Problem List   Diagnosis     Nevus comedonicus     Dyshidrotic dermatitis     Tobacco use disorder       Current Medications -   Current Outpatient Medications:      acetaminophen (TYLENOL) 325 MG tablet, Take 650 mg by mouth every 6 hours, Disp: , Rfl:      ibuprofen (ADVIL/MOTRIN) 200 MG tablet, Take 600 mg by mouth every 6 hours as needed for mild pain, Disp: , Rfl:      sildenafil (VIAGRA) 50 MG tablet, Take 1 tablet (50 mg) by mouth daily as needed (Patient not taking: Reported on 5/29/2020), Disp: 50 tablet, Rfl: 5    Allergies - No Known Allergies    Social History -   Social History     Socioeconomic History     Marital status:      Spouse name: None     Number of children: 2     Years of education: None     Highest education level: None   Occupational History     Occupation:    Social Needs     Financial resource strain: None     Food insecurity     Worry: None     Inability: None     Transportation needs     Medical: None     Non-medical: None   Tobacco Use     Smoking status: Current Every Day Smoker     Packs/day: 1.00     Years: 20.00     Pack years: 20.00     Types: Cigarettes     Smokeless tobacco: Never Used   Substance and Sexual Activity     Alcohol use: Yes     Alcohol/week: 0.0 standard drinks     Comment: Socially, rare. 6 beers in a month     Drug use: No     Sexual activity: Yes     Partners: Female   Lifestyle     Physical activity      Days per week: None     Minutes per session: None     Stress: None   Relationships     Social connections     Talks on phone: None     Gets together: None     Attends Denominational service: None     Active member of club or organization: None     Attends meetings of clubs or organizations: None     Relationship status: None     Intimate partner violence     Fear of current or ex partner: None     Emotionally abused: None     Physically abused: None     Forced sexual activity: None   Other Topics Concern     Parent/sibling w/ CABG, MI or angioplasty before 65F 55M? Not Asked   Social History Narrative     None       Family History -   Family History   Problem Relation Age of Onset     Diabetes Mother      Hypertension Mother      Atrial fibrillation Mother      Diabetes Father      Hypertension Father      Prostate Cancer Father      Heart Disease Father      Cancer Brother 40        Skin cancer, unsure which type       Review of Systems - As per HPI and PMHx, otherwise 7 system review of the head and neck negative.    Physical Exam  /73   Pulse 98   Temp 97.8  F (36.6  C) (Oral)   Wt 94.1 kg (207 lb 6.4 oz)   SpO2 97%   BMI 28.52 kg/m    General - The patient is in no distress.  Alert and oriented x3, answers questions and cooperates with examination appropriately.   Voice and Breathing - The patient was breathing comfortably without the use of accessory muscles. There was no wheezing, stridor, or stertor.  The patients voice was clear and strong.  Skin - examination of the lesion reveals an approximate 2 cm, raised, well-circumscribed lesion on the posterior aspect of the right earlobe. It is a sebaceous cyst. No infection. No ulceration of bleeding. No other lesions noted.  Eyes - Extraocular movements intact. Sclera were not icteric or injected, conjunctiva were pink and moist.  Neurologic - Cranial nerves II-XII are grossly intact. Specifically, the facial nerve is intact, House-Brackmann grade 1 of 6.  Facial sensation is intact and symmetric.  Neck -  Soft. Non-tender. Palpation of the occipital, submental, submandibular, internal jugular chain, and supraclavicular nodes did not demonstrate any abnormal lymph nodes or masses. The parotid glands were without masses. Palpation of the thyroid was soft and smooth, with no nodules or goiter appreciated.  The trachea was midline.    Procedure:    I explained the risk, benefits, and alteratives to skin sebaceous cyst excision. The patient agreed and wished to proceed.  I cleansed the skin of the right earlobe with alcohol.  I injected the lesion with 2% lidocaine, 1:100,000 epinephrine, 2 ml.  I then used Betadine to clean off the entire ear and right earlobe.  I made an elliptical incision including some skin that was redundant overlying the posterior aspect of the right earlobe.  I then used an iris scissors overlying the capsule of the sebaceous cyst.  I dissected on the cyst in all directions and was able to remove it.  Part of the capsule did tear open and some keratin debris spilled out but not into the wound.  I was able to get the entire sebaceous cyst free and out.  I washed the wound with saline.  I then closed the incision with 2 simple interrupted buried 4-0 Vicryl sutures, and simple interrupted 5-0 fast sutures on the skin surface.  Bacitracin was applied.    A/P - Raul CONNER Abdul is a 40 year old male with a right posterior ear lobule sebaceous cyst.  This was excised in its entirety.  I closed the skin with dissolvable sutures.  Wound care discussed including placing antibiotic ointment on this multiple times a day, avoiding sun, and watching for signs symptoms of infection.  If he develops any he should call.      Justin Hebert MD  Otolaryngology  [facility]

## 2020-11-22 ENCOUNTER — HEALTH MAINTENANCE LETTER (OUTPATIENT)
Age: 40
End: 2020-11-22

## 2021-01-27 NOTE — PROGRESS NOTES
"  3  SUBJECTIVE:   CC: Raul Abdul is an 40 year old male who presents for preventive health visit.     {Split Bill scripting  The purpose of this visit is to discuss your medical history and prevent health problems before you are sick. You may be responsible for a co-pay, coinsurance, or deductible if your visit today includes services such as checking on a sore throat, having an x-ray or lab test, or treating and evaluating a new or existing condition :435799}  Patient has been advised of split billing requirements and indicates understanding: {Yes and No:696942}  Healthy Habits:    Do you get at least three servings of calcium containing foods daily (dairy, green leafy vegetables, etc.)? { :258556::\"yes\"}    Amount of exercise or daily activities, outside of work: { :894022}    Problems taking medications regularly { :882558::\"No\"}    Medication side effects: { :775970::\"No\"}    Have you had an eye exam in the past two years? { :063996}    Do you see a dentist twice per year? { :011431}    Do you have sleep apnea, excessive snoring or daytime drowsiness?{ :672600}  {Outside tests to abstract? :160520}    {additional problems to add (Optional):770879}    Today's PHQ-2 Score:   PHQ-2 ( 1999 Pfizer) 1/25/2019 10/22/2018   Q1: Little interest or pleasure in doing things 0 0   Q2: Feeling down, depressed or hopeless 0 0   PHQ-2 Score 0 0   Q1: Little interest or pleasure in doing things Not at all Not at all   Q2: Feeling down, depressed or hopeless Not at all Not at all   PHQ-2 Score 0 0     {PHQ-2 LOOK IN ASSESSMENTS (Optional) :031134}  Abuse: Current or Past(Physical, Sexual or Emotional)- {YES/NO/NA:170747}  Do you feel safe in your environment? {YES/NO/NA:600377}        Social History     Tobacco Use     Smoking status: Current Every Day Smoker     Packs/day: 1.00     Years: 20.00     Pack years: 20.00     Types: Cigarettes     Smokeless tobacco: Never Used   Substance Use Topics     Alcohol use: Yes     " "Alcohol/week: 0.0 standard drinks     Comment: Socially, rare. 6 beers in a month     If you drink alcohol do you typically have >3 drinks per day or >7 drinks per week? {ETOH :091816}                      Last PSA:   PSA   Date Value Ref Range Status   05/16/2014 1.10 0 - 4 ug/L Final       Reviewed orders with patient. Reviewed health maintenance and updated orders accordingly - {Yes/No:134173::\"Yes\"}  {Chronicprobdata (Optional):609138}    Reviewed and updated as needed this visit by clinical staff                 Reviewed and updated as needed this visit by Provider                {HISTORY OPTIONS (Optional):117343}    ROS:  { :220209::\"CONSTITUTIONAL: NEGATIVE for fever, chills, change in weight\",\"INTEGUMENTARY/SKIN: NEGATIVE for worrisome rashes, moles or lesions\",\"EYES: NEGATIVE for vision changes or irritation\",\"ENT: NEGATIVE for ear, mouth and throat problems\",\"RESP: NEGATIVE for significant cough or SOB\",\"CV: NEGATIVE for chest pain, palpitations or peripheral edema\",\"GI: NEGATIVE for nausea, abdominal pain, heartburn, or change in bowel habits\",\" male: negative for dysuria, hematuria, decreased urinary stream, erectile dysfunction, urethral discharge\",\"MUSCULOSKELETAL: NEGATIVE for significant arthralgias or myalgia\",\"NEURO: NEGATIVE for weakness, dizziness or paresthesias\",\"PSYCHIATRIC: NEGATIVE for changes in mood or affect\"}    OBJECTIVE:   There were no vitals taken for this visit.  EXAM:  {Exam Choices:077680}    {Diagnostic Test Results (Optional):897448::\"Diagnostic Test Results:\",\"Labs reviewed in Epic\"}    ASSESSMENT/PLAN:   {Diag Picklist:556592}    Patient has been advised of split billing requirements and indicates understanding: {YES / NO:882646::\"Yes\"}  COUNSELING:  {MALE COUNSELING MESSAGES:157051::\"Reviewed preventive health counseling, as reflected in patient instructions\"}    Estimated body mass index is 28.52 kg/m  as calculated from the following:    Height as of 5/28/20: 1.816 m (5' " "11.5\").    Weight as of 5/29/20: 94.1 kg (207 lb 6.4 oz).    {Weight Management Plan (ACO) Complete if BMI is abnormal-  Ages 18-64  BMI >24.9.  Age 65+ with BMI <23 or >30 (Optional):351405}    He reports that he has been smoking cigarettes. He has a 20.00 pack-year smoking history. He has never used smokeless tobacco.  Tobacco Cessation Action Plan:   {TOBACCO CESSATION ACTION PLAN:892845}      Counseling Resources:  ATP IV Guidelines  Pooled Cohorts Equation Calculator  FRAX Risk Assessment  ICSI Preventive Guidelines  Dietary Guidelines for Americans, 2010  USDA's MyPlate  ASA Prophylaxis  Lung CA Screening    Juan Gonzales MD  [facility]  "

## 2021-01-28 ENCOUNTER — OFFICE VISIT (OUTPATIENT)
Dept: FAMILY MEDICINE | Facility: CLINIC | Age: 41
End: 2021-01-28
Payer: COMMERCIAL

## 2021-01-28 VITALS
WEIGHT: 204 LBS | HEIGHT: 72 IN | DIASTOLIC BLOOD PRESSURE: 78 MMHG | BODY MASS INDEX: 27.63 KG/M2 | HEART RATE: 95 BPM | TEMPERATURE: 97 F | SYSTOLIC BLOOD PRESSURE: 120 MMHG | OXYGEN SATURATION: 97 %

## 2021-01-28 DIAGNOSIS — R06.83 SNORING: ICD-10-CM

## 2021-01-28 DIAGNOSIS — Z12.5 SCREENING PSA (PROSTATE SPECIFIC ANTIGEN): ICD-10-CM

## 2021-01-28 DIAGNOSIS — E78.5 HYPERLIPIDEMIA LDL GOAL <130: ICD-10-CM

## 2021-01-28 DIAGNOSIS — R53.83 OTHER FATIGUE: ICD-10-CM

## 2021-01-28 DIAGNOSIS — Z00.00 ROUTINE GENERAL MEDICAL EXAMINATION AT A HEALTH CARE FACILITY: Primary | ICD-10-CM

## 2021-01-28 PROCEDURE — 99396 PREV VISIT EST AGE 40-64: CPT | Performed by: FAMILY MEDICINE

## 2021-01-28 PROCEDURE — 99213 OFFICE O/P EST LOW 20 MIN: CPT | Mod: 25 | Performed by: FAMILY MEDICINE

## 2021-01-28 ASSESSMENT — ENCOUNTER SYMPTOMS
DIZZINESS: 0
ABDOMINAL PAIN: 0
HEARTBURN: 1
NAUSEA: 0
FEVER: 0
DYSURIA: 0
JOINT SWELLING: 0
HEADACHES: 0
FREQUENCY: 0
MYALGIAS: 0
SORE THROAT: 0
PALPITATIONS: 0
EYE PAIN: 0
PARESTHESIAS: 0
SHORTNESS OF BREATH: 0
HEMATURIA: 0
WEAKNESS: 0
HEMATOCHEZIA: 0
ARTHRALGIAS: 0
CHILLS: 0
DIARRHEA: 0
CONSTIPATION: 0
COUGH: 0
NERVOUS/ANXIOUS: 0

## 2021-01-28 ASSESSMENT — MIFFLIN-ST. JEOR: SCORE: 1873.34

## 2021-01-28 NOTE — PROGRESS NOTES
SUBJECTIVE:   CC: Raul Abdul is an 40 year old male who presents for preventative health visit.   Would like testosterone check.needs more exercise.   No chest pain or shortness of breath.   Intermittent cough especially at night. Some snoring - possible marcel. No major sinus issues. Daytime fatigue. Family history marcel.   Sleep sides/stomach. Some social ALCOHOL  No family history dm  Dad with mi in 50yos.   Mom with cva - 71yo.   Dad with prostate elderly. No family history colon. Older brother with melanoma.  Smokes 1/2ppd. Not interested in xray chest.   No nausea, vomiting or diarrhea or black/black stools.  viagra = gerd symptoms. Omeprazole helpful. No dyshagia.   No hematuria. Noted. No std concerns.   Emotionally doing.    Back to work. Family doing ok.   Decreased sex drive.  Patient has been advised of split billing requirements and indicates understanding: Yes  Healthy Habits:     Getting at least 3 servings of Calcium per day:  Yes    Bi-annual eye exam:  Yes    Dental care twice a year:  Yes    Sleep apnea or symptoms of sleep apnea:  Daytime drowsiness and Excessive snoring    Diet:  Regular (no restrictions)    Frequency of exercise:  1 day/week    Duration of exercise:  Less than 15 minutes    Taking medications regularly:  Yes    Medication side effects:  None    PHQ-2 Total Score: 2    Additional concerns today:  Yes      Discuss sleep referral     Today's PHQ-2 Score:   PHQ-2 ( 1999 Pfizer) 1/28/2021   Q1: Little interest or pleasure in doing things 1   Q2: Feeling down, depressed or hopeless 1   PHQ-2 Score 2   Q1: Little interest or pleasure in doing things Several days   Q2: Feeling down, depressed or hopeless Several days   PHQ-2 Score 2       Abuse: Current or Past(Physical, Sexual or Emotional)- No  Do you feel safe in your environment? Yes        Social History     Tobacco Use     Smoking status: Current Every Day Smoker     Packs/day: 1.00     Years: 20.00     Pack years: 20.00     Types:  Cigarettes     Smokeless tobacco: Never Used   Substance Use Topics     Alcohol use: Yes     Alcohol/week: 0.0 standard drinks     Comment: Socially, rare. 6 beers in a month     If you drink alcohol do you typically have >3 drinks per day or >7 drinks per week? No    Alcohol Use 1/28/2021   Prescreen: >3 drinks/day or >7 drinks/week? No   Prescreen: >3 drinks/day or >7 drinks/week? -       Last PSA:   PSA   Date Value Ref Range Status   05/16/2014 1.10 0 - 4 ug/L Final       Reviewed orders with patient. Reviewed health maintenance and updated orders accordingly - Yes  Lab work is in process    Reviewed and updated as needed this visit by clinical staff   Allergies  Meds              Reviewed and updated as needed this visit by Provider                    Review of Systems   Constitutional: Negative for chills and fever.   HENT: Negative for congestion, ear pain, hearing loss and sore throat.    Eyes: Negative for pain and visual disturbance.   Respiratory: Negative for cough and shortness of breath.    Cardiovascular: Negative for chest pain, palpitations and peripheral edema.   Gastrointestinal: Positive for heartburn. Negative for abdominal pain, constipation, diarrhea, hematochezia and nausea.   Genitourinary: Positive for urgency. Negative for dysuria, frequency, genital sores and hematuria.   Musculoskeletal: Negative for arthralgias, joint swelling and myalgias.   Skin: Negative for rash.   Neurological: Negative for dizziness, weakness, headaches and paresthesias.   Psychiatric/Behavioral: Negative for mood changes. The patient is not nervous/anxious.      All other ROS negative.    OBJECTIVE:   There were no vitals taken for this visit.  /78   Pulse 95   Temp 97  F (36.1  C) (Tympanic)   Ht 1.829 m (6')   Wt 92.5 kg (204 lb)   SpO2 97%   BMI 27.67 kg/m       Physical Exam  GENERAL: healthy, alert and no distress  EYES: Eyes grossly normal to inspection, PERRL and conjunctivae and sclerae  normal  HENT: ear canals and TM's normal, nose and mouth without ulcers or lesions  NECK: no adenopathy, no asymmetry, masses, or scars and thyroid normal to palpation  RESP: lungs clear to auscultation - no rales, rhonchi or wheezes  BREAST: normal without masses, tenderness or nipple discharge and no palpable axillary masses or adenopathy  CV: regular rate and rhythm, normal S1 S2, no S3 or S4, no murmur, click or rub, no peripheral edema and peripheral pulses strong  ABDOMEN: soft, nontender, no hepatosplenomegaly, no masses and bowel sounds normal   (male): patient deferred /rectal exams. Denies pain/masses or hernia.   MS: no gross musculoskeletal defects noted, no edema  SKIN: no suspicious lesions or rashes  NEURO: Normal strength and tone, mentation intact and speech normal  PSYCH: mentation appears normal, affect normal/bright  LYMPH: no cervical, supraclavicular, axillary  adenopathy    ASSESSMENT/PLAN:   ASSESSMENT / PLAN:  (Z00.00) Routine general medical examination at a health care facility  (primary encounter diagnosis)  Comment: generally health   Plan: Comprehensive metabolic panel, CBC with         platelets, TSH with free T4 reflex        Reviewed self mole/testicle check handout.  Stop smoking. Xray if worsening cough/ shortness of breath. - patient deferred imaging. Call/email with questions/concerns    (R06.83) Snoring  Comment: possible marcel  Plan: SLEEP EVALUATION & MANAGEMENT REFERRAL - [facility]          [phone] (Age 15 and up)        Limit ALCOHOL and increase exercise. Breath-rite and sleep on sides. Follow-up sleep study    (R53.83) Other fatigue  Comment: likely from poor sleep  Plan: CBC with platelets, TSH with free T4 reflex,         Testosterone Free and Total        Future labs. Exercise and return to clinic if worse/new symptoms or if not improving with better sleep    (Z12.5) Screening PSA (prostate specific antigen)    Plan:  "Prostate spec antigen screen           (E78.5) Hyperlipidemia LDL goal <130  Comment: ok in pastg  Plan: Lipid panel reflex to direct LDL Fasting        Blood pressure ok. Quit smoking. Chest pain or shortness of breath to er. ?fish oil         Patient has been advised of split billing requirements and indicates understanding: Yes  COUNSELING:   Reviewed preventive health counseling, as reflected in patient instructions       Regular exercise       Healthy diet/nutrition       Vision screening       Hearing screening       Prostate cancer screening       Osteoporosis prevention/bone health    Estimated body mass index is 28.52 kg/m  as calculated from the following:    Height as of 5/28/20: 1.816 m (5' 11.5\").    Weight as of 5/29/20: 94.1 kg (207 lb 6.4 oz).         He reports that he has been smoking cigarettes. He has a 20.00 pack-year smoking history. He has never used smokeless tobacco.  Tobacco Cessation Action Plan:   Information offered: Patient not interested at this time      Counseling Resources:  ATP IV Guidelines  Pooled Cohorts Equation Calculator  FRAX Risk Assessment  ICSI Preventive Guidelines  Dietary Guidelines for Americans, 2010  USDA's MyPlate  ASA Prophylaxis  Lung CA Screening    Juan Gonzales MD  [facility]  "

## 2021-02-10 VITALS — HEIGHT: 72 IN | WEIGHT: 195 LBS | BODY MASS INDEX: 26.41 KG/M2

## 2021-02-10 ASSESSMENT — MIFFLIN-ST. JEOR: SCORE: 1832.51

## 2021-02-10 NOTE — PATIENT INSTRUCTIONS
Your BMI is Body mass index is 26.45 kg/m .  Weight management is a personal decision.  If you are interested in exploring weight loss strategies, the following discussion covers the approaches that may be successful. Body mass index (BMI) is one way to tell whether you are at a healthy weight, overweight, or obese. It measures your weight in relation to your height.  A BMI of 18.5 to 24.9 is in the healthy range. A person with a BMI of 25 to 29.9 is considered overweight, and someone with a BMI of 30 or greater is considered obese. More than two-thirds of American adults are considered overweight or obese.  Being overweight or obese increases the risk for further weight gain. Excess weight may lead to heart disease and diabetes.  Creating and following plans for healthy eating and physical activity may help you improve your health.  Weight control is part of healthy lifestyle and includes exercise, emotional health, and healthy eating habits. Careful eating habits lifelong are the mainstay of weight control. Though there are significant health benefits from weight loss, long-term weight loss with diet alone may be very difficult to achieve- studies show long-term success with dietary management in less than 10% of people. Attaining a healthy weight may be especially difficult to achieve in those with severe obesity. In some cases, medications, devices and surgical management might be considered.  What can you do?  If you are overweight or obese and are interested in methods for weight loss, you should discuss this with your provider.     Consider reducing daily calorie intake by 500 calories.     Keep a food journal.     Avoiding skipping meals, consider cutting portions instead.    Diet combined with exercise helps maintain muscle while optimizing fat loss. Strength training is particularly important for building and maintaining muscle mass. Exercise helps reduce stress, increase energy, and improves fitness.  Increasing exercise without diet control, however, may not burn enough calories to loose weight.       Start walking three days a week 10-20 minutes at a time    Work towards walking thirty minutes five days a week     Eventually, increase the speed of your walking for 1-2 minutes at time    In addition, we recommend that you review healthy lifestyles and methods for weight loss available through the National Institutes of Health patient information sites:  http://win.niddk.nih.gov/publications/index.htm    And look into health and wellness programs that may be available through your health insurance provider, employer, local community center, or sabrina club.    Weight management plan: Patient was referred to their PCP to discuss a diet and exercise plan.  MY CONTACT NUMBERS ARE:   DOCTOR : CAROLINA Bundy  SLEEP CENTER :   CPAP EQUIPMENT :    IF I HAVE SLEEP APNEA.....  WHERE CAN I FIND MORE INFORMATION?    American Academy of Sleep Medicine Patient information on sleep disorders:  http://yoursleep.aasmnet.org    THINGS TO REMEMBER  In most situations, sleep apnea is a lifelong disease that must be managed with daily therapy. Untreated disease, when severe, may result in an increased risk for an array of problems from heart disease to mood changes, car accidents and shorter lifespan.    CPAP -  WHY AND HOW?  Continuous positive airway pressure, or CPAP, is the most effective treatment for obstructive sleep apnea. A decision to use CPAP is a major step forward in the pursuit of a healthier life. The successful use of CPAP will help you breathe easier, sleep better and live healthier. Using CPAP can be a positive experience if you keep these knox points in mind:  1. Commitment  CPAP is not a quick fix for your problem. It involves a long-term commitment to improve your sleep and your health.    2. Communication  Stay in close communication with both your sleep doctor and your CPAP supplier. Ask lots of questions and  "seek help when you need it.    3. Consistency  Use CPAP all night, every night and for every nap. You will receive the maximum health benefits from CPAP when you use it every time that you sleep. This will also make it easier for your body to adjust to the treatment.    4. Correction  The first machine and mask that you try may not be the best ones for you. Work with your sleep doctor and your CPAP supplier to make corrections to your equipment selection. Ask about trying a different type of machine or mask if you have ongoing problems. Make sure that your mask is a good fit and learn to use your equipment properly.    5. Challenge  Tell a family member or close friend to ask you each morning if you used your CPAP the previous night. Have someone to challenge you to give it your best effort.    6. Connection   Your adjustment to CPAP will be easier if you are able to connect with others who use the same treatment. Ask your sleep doctor if there is a support group in your area for people who have sleep apnea, or look for one on the Internet.    7. Comfort   Increase your level of comfort by using a saline spray, decongestant or heated humidifier if CPAP irritates your nose, mouth or throat. Use your unit's \"ramp\" setting to slowly get used to the air pressure level. There may be soft pads you can buy that will fit over your mask straps. Look on www.CPAP.com for accessories such as these straps, a pillow contoured for side-sleeping with CPAP, longer hoses, hose covers to reduce condensation, or stands to keep the hose out of your way.                                 8. Cleaning   Clean your mask, tubing and headgear on a regular basis. Put this time in your schedule so that you don't forget to do it. Check and replace the filters for your CPAP unit and humidifier.    9. Completion   Although you are never finished with CPAP therapy, you should reward yourself by celebrating the completion of your first month of " treatment. Expect this first month to be your hardest period of adjustment. It will involve some trial and error as you find the machine, mask and pressure settings that are right for you.    Continuation  After your first month of treatment, continue to make a daily commitment to use your CPAP all night, every night and for every nap.    CPAP-Tips to starting with success:  Begin using your CPAP for short periods of time during the day while you watch TV or read.    Use CPAP every night and for every nap. Using it less often reduces the health benefits and makes it harder for your body to get used to it.    Newer CPAP models are virtually silent; however, if you find the sound of your CPAP machine to be bothersome, place the unit under your bed to dampen the sound.     Make small adjustments to your mask, tubing, straps and headgear until you get the right fit. Tightening the mask may actually worsen the leak.  If it leaves significant marks on your face or irritates the bridge of your nose, it may not be the best mask for you.  Speak with the person who supplied the mask and consider trying other masks.    Use a saline nasal spray to ease mild nasal congestion. Neti-Pot or saline nasal rinses may also help. Nasal gel sprays can help reduce nasal dryness.  Biotene mouthwash can be helpful to protect your teeth if you experience frequent dry mouth.  Dry mouth may be a sign of air escaping out of your mouth or out of the mask in the case of a full face mask.  Speak with your provider if you expect that is the case.     Take a nasal decongestant to relieve more severe nasal or sinus congestion.  Do not use Afrin (oxymetazoline) nasal spray more than 3 days in a row.  Speak with your sleep doctor if your nasal congestion is chronic.    Use a heated humidifier that fits your CPAP model to enhance your breathing comfort. Adjust the heat setting up if you get a dry nose or throat, down if you get condensation in the hose  or mask.  Position the CPAP lower than you so that any condensation in the hose drains back into the machine rather than towards the mask.    Try a system that uses nasal pillows if traditional masks give you problems.    Clean your mask, tubing and headgear once a week. Make sure the equipment dries fully.    Regularly check and replace the filters for your CPAP unit and humidifier.    Work closely with your sleep doctor and your CPAP supplier to make sure that you have the machine, mask and air pressure setting that works best for you.    BESIDES CPAP, WHAT OTHER THERAPIES ARE THERE?    Postioning devices if you only have the problem on your back    Dental devices if your condition is mild    Nasal valves may be effective though experience is limited    Tongue Retaining Device if missing teeth precludes the use of a dental device    Weight loss if you are overweight    Surgery in limited cases where devices are not acceptable or there are problems with structures in the nose and throat  If treated with one of these alternative options, further evaluation is necessary to ensure that the therapy is effective. This may require some form of testing.     Healthy Lifestyle:  Healthy diet, exercise and Limit alcohol: Not only will excessive alcohol increase your weight over time, but it irritates the throat tissues and make them swell, shrinking the airway and causing snoring. Drinking alcohol should be limited and stopped within 3-4 hours before going to bed.   Stop smoking: (Red swollen throat, heat, nicotine), also irritates and swells the airway, among numerous other negative health consequences.    Positioning Device  This example shows a pillow that straps around the waist. It may be appropriate for those whose sleep study shows milder sleep apnea that occurs primarily when lying flat on one's back. Preliminary studies have shown benefit but effectiveness at home should be verified.    Nasal Valves               Nasal valves may not be effective if you have frequent nasal congestion or have difficulty breathing through your nose. They may be an option for mild apnea if other options are not well tolerated. The efficacy of these devices is generally less than CPAP or oral appliances.  Oral Appliance  These are examples of two of many custom-made devices that are more likely to work in mild sleep apnea  Oral appliances are dental mouth pieces that fit very much like a sports mouth guards or removable orthodontic retainers. They are used to treat snoring  And obstructive sleep apnea . The device prevents the airway from collapsing by either holding the tongue or supporting the jaw in a forward position. Since oral appliances are non-invasive and easy to use, they may be considered as an early treatment option. Oral appliance therapy (OAT) involves the customization, selection, fabrication, fitting, adjustments and long-term follow-up care of specially designed oral devices, worn during sleep, which reposition the lower jaw and tongue base forward to maintain an open airway.  Custom made oral appliances are proven to be more effective than over-the-counter devices. Therefore, the over-the-counter devices are recommended not to be used as a screening tool nor as a therapeutic option.  Who gets a dental device?  Oral appliance therapy can be used as an alternative to  CPAP therapy for the treatment of mild to moderate sleep apnea and for those patients who prefer OAT to CPAP. Oral appliance therapy is a first line therapy for the treatment of primary snoring. Additionally, OAT is an option for those that cannot tolerate CPAP as therapy or who have experienced insufficient surgical results.    Possible side effects?  Frequent but minor side effects include: excessive salivation, dry mouth, discomfort of teeth and jaw and temporary changes in the patient s bite.  Potential complications include: jaw pain, permanent occlusal  changes and TMJ symptoms.  The above mentioned side effects and complications can be recognized and managed by dentists trained in dental sleep medicine.    Finding a dentist that practices dental sleep medicine  Specific training is available through the American Academy of Dental Sleep Medicine for dentists interested in working in the field of sleep. To find a dentist who is educated in the field of sleep and the use of oral appliances, near you, visit the Web site of the American Academy of Dental Sleep Medicine; also see http://www.accpstorage.org/newOrganization/patients/oralAppliances.pdf   To search for a dentist certified in these practices:  Http://aadsm.org/FindADentist.aspx?1  Http://www.accpstorage.org/newOrganization/patients/oralAppliances.pdf    Tongue Retaining Device               Tongue Retaining Devices are devices that generally  suction cup  onto the tongue preventing it from falling back into the back of the throat during sleep.  They may be an option for people missing teeth, but can be uncomfortable. This particular device can be purchased online, but similar devices made by dentists fit more precisely and may be tolerated better. In general, they are rarely effective and often not very well tolerated.    Weight Loss:  Some patients may experience reduction or elimination of sleep apnea with weight loss.  Though there are significant health benefits from weight loss, long-term weight loss is very difficult to achieve- studies show success with dietary management in less that 10% of people.     If you are interested in dietary weight loss, you should review the options discussed at the National Institutes of Health patient information site:     Http:/www.health.nih.gov/topic/WeightLossDieting    Bariatric programs offer counseling in all methods of weight loss:    Http:/www.[facility].org/Specialties/WeightLossSurgeryandMedicalMgmt/htm    Surgery:  There are a number of surgeries that  "have been attempted to treat apnea. In general, surgical options are usually reserved for cases in which there is a physical abnormality contributing to obstruction or other treatment options are ineffective or not tolerated. Most surgical options are either unreliable or quite invasive. One of the more common procedures is:  Uvulopalatopharyngoplasty: In this procedure, the uvula (the finger-like tissue that hangs in the back of the throat), part of the soft palate (the tissue that the uvula is attached to), and sometimes the tonsils or adenoids are removed. The efficacy of this surgery is around 30-50% .  After surgery, complications may include:  Sleepiness and sleep apnea related to post-surgery medication   Swelling, infection and bleeding   A sore throat and/or difficulty swallowing   Drainage of secretions into the nose and a nasal quality to the voice. English language speech does not seem to be affected by this surgery.   Narrowing of the airway in the nose and throat (hence constricting breathing) snoring and even iatrogenically caused sleep apnea. By cutting the tissues, excess scar tissue can \"tighten\" the airway and make it even smaller than it was before UPPP.  Patients who have had the uvula removed will become unable to correctly speak [place] or any other language that has a uvular 'r' phoneme.    Surgeries to help resolve nasal congestion may help reduce the severity of apnea slightly. Nasal congestion does not cause apnea on its own, so these surgeries are usually not performed just for MARLYS.  They may be worth considering if the nasal congestion is significantly bothersome independent of apnea.       "

## 2021-02-10 NOTE — PROGRESS NOTES
"Raul is a 40 year old who is being evaluated via a billable video visit.      How would you like to obtain your AVS? MyChart  If the video visit is dropped, the invitation should be resent by: Text to cell phone: [phone]   Will anyone else be joining your video visit? No       Melanie Munroe CMA on 2/10/2021 at 4:20 PM      Video Start Time: 11:03 AM  Video-Visit Details    Type of service:  Video Visit    Video End Time:11:38 AM    Originating Location (pt. Location): Home    Distant Location (provider location):  [facility]     Platform used for Video Visit: [employer]       Sleep Consultation:    Date on this visit: 2/11/2021    Raul Abdul is sent by Juan Gonzales for a sleep consultation regarding snoring.    Primary Physician: Juan Gonzales     CC: \"I don't sleep well\"    HPI: Raul Abdul is a 40 year old male with history of smoking.     Raul goes to sleep at 10:30 PM during the week. He wakes up at 6-10 AM with an alarm. He falls asleep in 5 minutes.  Raul denies difficulty falling asleep.  He wakes up and stays up in the middle of the night. On weekends, Raul goes to sleep between 2 and 4 AM.  He wakes up at 10:00 AM without an alarm. He falls asleep in 5 minutes.  Patient gets 4-7 hours of sleep per night. He does not feel refreshed.     Patient does not watch TV in bed.     Raul does not do shift work.        Raul does snore every night and snoring is loud. Patient does have a regular bed partner. There is report of gasping.  He does have witnessed apneas. They never sleep separately.  Patient sleeps on his side and stomach. He has occasional morning headaches. Raul denies any bruxism.     Raul denies difficulty breathing through his nose for the most part.      Raul has gained 0-5 pounds in the last year.  Patient's [place] Sleepiness score 15/24 inconsistent with excessive  daytime sleepiness.      Raul naps occasionally. He takes some inadvertant " naps.  He denies falling asleep while driving.  Patient was counseled on the importance of driving while alert, to pull over if drowsy, or nap before getting into the vehicle if sleepy.  He uses 6-8 sodas/day. Last caffeine intake varies.    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     Priority: Medium     Dyshidrotic dermatitis 09/18/2012     Priority: Medium        Past Medical/Surgical History:  Past Medical History:   Diagnosis Date     Tobacco abuse      Past Surgical History:   Procedure Laterality Date     AS REPAIR EPIGASTRIC HERNIA,REDUC         Social History:  Social History     Socioeconomic History     Marital status:      Spouse name: Not on file     Number of children: 2     Years of education: Not on file     Highest education level: Not on file   Occupational History     Occupation:    Social Needs     Financial resource strain: Not on file     Food insecurity     Worry: Not on file     Inability: Not on file     Transportation needs     Medical: Not on file     Non-medical: Not on file   Tobacco Use     Smoking status: Current Every Day Smoker     Packs/day: 1.00     Years: 20.00     Pack years: 20.00     Types: Cigarettes     Smokeless tobacco: Never Used   Substance and Sexual Activity     Alcohol use: Yes     Alcohol/week: 0.0 standard drinks     Comment: Socially, rare. 6 beers in a month     Drug use: No     Sexual activity: Yes     Partners: Female    Lifestyle     Physical activity     Days per week: Not on file     Minutes per session: Not on file     Stress: Not on file   Relationships     Social connections     Talks on phone: Not on file     Gets together: Not on file     Attends Yarsani service: Not on file     Active member of club or organization: Not on file     Attends meetings of clubs or organizations: Not on file     Relationship status: Not on file     Intimate partner violence     Fear of current or ex partner: Not on file     Emotionally abused: Not on file     Physically abused: Not on file     Forced sexual activity: Not on file   Other Topics Concern     Parent/sibling w/ CABG, MI or angioplasty before 65F 55M? Not Asked   Social History Narrative     Not on file       Family History:  Family History   Problem Relation Age of Onset     Diabetes Mother      Hypertension Mother      Atrial fibrillation Mother      Diabetes Father      Hypertension Father      Prostate Cancer Father      Heart Disease Father      Cancer Brother 40        Skin cancer, unsure which type       Review of Systems:  A complete review of systems reviewed by me is negative with the exeption of what has been mentioned in the history of present illness.    Physical Examination:  Vitals: Ht 1.829 m (6')   Wt 88.5 kg (195 lb)   BMI 26.45 kg/m    BMI= Body mass index is 26.45 kg/m .         [place] Total Score 2/10/2021   Total score - [place] 15       CELESTE Total Score: 19 (02/10/21 1600)    GENERAL APPEARANCE: alert and no distress  EYES: Eyes grossly normal to inspection, PERRL and conjunctivae and sclerae normal  RESP: breathing is non-labored  NEURO: mentation intact and speech normal  PSYCH: mentation appears normal and affect normal/bright      Last Comprehensive Metabolic Panel:  Sodium   Date Value Ref Range Status   10/22/2018 139 133 - 144 mmol/L Final     Potassium   Date Value Ref Range Status   10/22/2018 3.6 3.4 - 5.3 mmol/L Final     Chloride   Date Value  Ref Range Status   10/22/2018 104 94 - 109 mmol/L Final     Carbon Dioxide   Date Value Ref Range Status   10/22/2018 31 20 - 32 mmol/L Final     Anion Gap   Date Value Ref Range Status   10/22/2018 4 3 - 14 mmol/L Final     Glucose   Date Value Ref Range Status   10/22/2018 91 70 - 99 mg/dL Final     Comment:     Non Fasting     Urea Nitrogen   Date Value Ref Range Status   10/22/2018 16 7 - 30 mg/dL Final     Creatinine   Date Value Ref Range Status   10/22/2018 0.84 0.66 - 1.25 mg/dL Final     GFR Estimate   Date Value Ref Range Status   10/22/2018 >90 >60 mL/min/1.7m2 Final     Comment:     Non  GFR Calc     Calcium   Date Value Ref Range Status   10/22/2018 8.9 8.5 - 10.1 mg/dL Final     No results found for: TSH    Impression:  Patient has features and risk factors for possible obstructive sleep apnea including: loud snoring, witnessed apnea, non-refreshing sleep, daytime sleepiness and difficulty maintaining sleep.  The STOP-BANG score is 4/8. The pathophysiology, diagnosis and treatment of MARLYS was discussed.   Plan:    1. Schedule a Home Sleep Apnea Testing ([facility] 1).    2. Reviewed sleep hygiene.   3. Advised him against drowsy driving.    4. Recommend weight management.     Literature provided regarding sleep apnea.      He will follow up with me in approximately two weeks after his sleep study has been competed to review the results and discuss plan of care.         Iain Berger PA-C    CC: Juan Gonzales

## 2021-02-11 ENCOUNTER — VIRTUAL VISIT (OUTPATIENT)
Dept: SLEEP MEDICINE | Facility: CLINIC | Age: 41
End: 2021-02-11
Attending: FAMILY MEDICINE
Payer: COMMERCIAL

## 2021-02-11 DIAGNOSIS — R06.83 SNORING: ICD-10-CM

## 2021-02-11 DIAGNOSIS — R53.81 MALAISE AND FATIGUE: ICD-10-CM

## 2021-02-11 DIAGNOSIS — R06.89 DYSPNEA AND RESPIRATORY ABNORMALITY: Primary | ICD-10-CM

## 2021-02-11 DIAGNOSIS — R53.83 MALAISE AND FATIGUE: ICD-10-CM

## 2021-02-11 DIAGNOSIS — R06.00 DYSPNEA AND RESPIRATORY ABNORMALITY: Primary | ICD-10-CM

## 2021-02-11 DIAGNOSIS — G47.30 SLEEP APNEA, UNSPECIFIED TYPE: ICD-10-CM

## 2021-02-11 DIAGNOSIS — Z72.820 LACK OF ADEQUATE SLEEP: ICD-10-CM

## 2021-02-11 PROBLEM — F17.200 TOBACCO USE DISORDER: Chronic | Status: ACTIVE | Noted: 2017-07-31

## 2021-02-11 PROCEDURE — 99203 OFFICE O/P NEW LOW 30 MIN: CPT | Mod: 95 | Performed by: PHYSICIAN ASSISTANT

## 2021-02-18 DIAGNOSIS — Z00.00 ROUTINE GENERAL MEDICAL EXAMINATION AT A HEALTH CARE FACILITY: ICD-10-CM

## 2021-02-18 DIAGNOSIS — E78.5 HYPERLIPIDEMIA LDL GOAL <130: ICD-10-CM

## 2021-02-18 DIAGNOSIS — R53.83 OTHER FATIGUE: ICD-10-CM

## 2021-02-18 DIAGNOSIS — Z12.5 SCREENING PSA (PROSTATE SPECIFIC ANTIGEN): ICD-10-CM

## 2021-02-18 LAB
ALBUMIN SERPL-MCNC: 3.9 G/DL (ref 3.4–5)
ALP SERPL-CCNC: 74 U/L (ref 40–150)
ALT SERPL W P-5'-P-CCNC: 19 U/L (ref 0–70)
ANION GAP SERPL CALCULATED.3IONS-SCNC: 3 MMOL/L (ref 3–14)
AST SERPL W P-5'-P-CCNC: 8 U/L (ref 0–45)
BILIRUB SERPL-MCNC: 0.7 MG/DL (ref 0.2–1.3)
BUN SERPL-MCNC: 10 MG/DL (ref 7–30)
CALCIUM SERPL-MCNC: 9.3 MG/DL (ref 8.5–10.1)
CHLORIDE SERPL-SCNC: 103 MMOL/L (ref 94–109)
CHOLEST SERPL-MCNC: 181 MG/DL
CO2 SERPL-SCNC: 32 MMOL/L (ref 20–32)
CREAT SERPL-MCNC: 0.92 MG/DL (ref 0.66–1.25)
ERYTHROCYTE [DISTWIDTH] IN BLOOD BY AUTOMATED COUNT: 12.2 % (ref 10–15)
GFR SERPL CREATININE-BSD FRML MDRD: >90 ML/MIN/{1.73_M2}
GLUCOSE SERPL-MCNC: 94 MG/DL (ref 70–99)
HCT VFR BLD AUTO: 45.3 % (ref 40–53)
HDLC SERPL-MCNC: 41 MG/DL
HGB BLD-MCNC: 15.2 G/DL (ref 13.3–17.7)
LDLC SERPL CALC-MCNC: 115 MG/DL
MCH RBC QN AUTO: 31.2 PG (ref 26.5–33)
MCHC RBC AUTO-ENTMCNC: 33.6 G/DL (ref 31.5–36.5)
MCV RBC AUTO: 93 FL (ref 78–100)
NONHDLC SERPL-MCNC: 140 MG/DL
PLATELET # BLD AUTO: 292 10E9/L (ref 150–450)
POTASSIUM SERPL-SCNC: 3.6 MMOL/L (ref 3.4–5.3)
PROT SERPL-MCNC: 7.2 G/DL (ref 6.8–8.8)
PSA SERPL-ACNC: 0.89 UG/L (ref 0–4)
RBC # BLD AUTO: 4.87 10E12/L (ref 4.4–5.9)
SODIUM SERPL-SCNC: 138 MMOL/L (ref 133–144)
TRIGL SERPL-MCNC: 123 MG/DL
TSH SERPL DL<=0.005 MIU/L-ACNC: 2.08 MU/L (ref 0.4–4)
WBC # BLD AUTO: 6 10E9/L (ref 4–11)

## 2021-02-18 PROCEDURE — 84270 ASSAY OF SEX HORMONE GLOBUL: CPT | Performed by: FAMILY MEDICINE

## 2021-02-18 PROCEDURE — 85027 COMPLETE CBC AUTOMATED: CPT | Performed by: FAMILY MEDICINE

## 2021-02-18 PROCEDURE — G0103 PSA SCREENING: HCPCS | Performed by: FAMILY MEDICINE

## 2021-02-18 PROCEDURE — 80053 COMPREHEN METABOLIC PANEL: CPT | Performed by: FAMILY MEDICINE

## 2021-02-18 PROCEDURE — 84443 ASSAY THYROID STIM HORMONE: CPT | Performed by: FAMILY MEDICINE

## 2021-02-18 PROCEDURE — 99000 SPECIMEN HANDLING OFFICE-LAB: CPT | Performed by: FAMILY MEDICINE

## 2021-02-18 PROCEDURE — 80061 LIPID PANEL: CPT | Performed by: FAMILY MEDICINE

## 2021-02-18 PROCEDURE — 36415 COLL VENOUS BLD VENIPUNCTURE: CPT | Performed by: FAMILY MEDICINE

## 2021-02-18 PROCEDURE — 84403 ASSAY OF TOTAL TESTOSTERONE: CPT | Mod: 90 | Performed by: FAMILY MEDICINE

## 2021-02-22 LAB
SHBG SERPL-SCNC: 59 NMOL/L (ref 11–80)
TESTOST FREE SERPL-MCNC: 6.34 NG/DL (ref 4.7–24.4)
TESTOST SERPL-MCNC: 456 NG/DL (ref 240–950)

## 2021-03-05 ENCOUNTER — OFFICE VISIT (OUTPATIENT)
Dept: SLEEP MEDICINE | Facility: CLINIC | Age: 41
End: 2021-03-05
Payer: COMMERCIAL

## 2021-03-05 DIAGNOSIS — R06.83 SNORING: ICD-10-CM

## 2021-03-05 DIAGNOSIS — R53.81 MALAISE AND FATIGUE: ICD-10-CM

## 2021-03-05 DIAGNOSIS — R53.83 MALAISE AND FATIGUE: ICD-10-CM

## 2021-03-05 DIAGNOSIS — R06.89 DYSPNEA AND RESPIRATORY ABNORMALITY: ICD-10-CM

## 2021-03-05 DIAGNOSIS — R06.00 DYSPNEA AND RESPIRATORY ABNORMALITY: ICD-10-CM

## 2021-03-05 DIAGNOSIS — G47.30 SLEEP APNEA, UNSPECIFIED TYPE: ICD-10-CM

## 2021-03-05 DIAGNOSIS — Z72.820 LACK OF ADEQUATE SLEEP: ICD-10-CM

## 2021-03-09 NOTE — PROGRESS NOTES
Device has been registered and shipped via [employer] on 3/9/21. Patient was notified that package was mailed out.     Sia Clayton MA on 3/9/2021 at 9:08 AM

## 2021-04-12 ENCOUNTER — TELEPHONE (OUTPATIENT)
Dept: SLEEP MEDICINE | Facility: CLINIC | Age: 41
End: 2021-04-12

## 2021-04-22 NOTE — PROGRESS NOTES
Called and left message to check if patient is planning on completing test.     Sia Clayton MA on 4/22/2021 at 8:23 AM

## 2021-08-03 ENCOUNTER — OFFICE VISIT (OUTPATIENT)
Dept: FAMILY MEDICINE | Facility: CLINIC | Age: 41
End: 2021-08-03
Payer: COMMERCIAL

## 2021-08-03 VITALS
HEART RATE: 102 BPM | WEIGHT: 203 LBS | DIASTOLIC BLOOD PRESSURE: 76 MMHG | SYSTOLIC BLOOD PRESSURE: 107 MMHG | HEIGHT: 72 IN | OXYGEN SATURATION: 96 % | BODY MASS INDEX: 27.5 KG/M2 | TEMPERATURE: 98.2 F

## 2021-08-03 DIAGNOSIS — N50.89 MASS OF LEFT TESTICLE: Primary | ICD-10-CM

## 2021-08-03 PROCEDURE — 99213 OFFICE O/P EST LOW 20 MIN: CPT | Performed by: FAMILY MEDICINE

## 2021-08-03 RX ORDER — OMEPRAZOLE 10 MG/1
20 CAPSULE, DELAYED RELEASE ORAL DAILY
COMMUNITY

## 2021-08-03 ASSESSMENT — MIFFLIN-ST. JEOR: SCORE: 1863.8

## 2021-08-03 NOTE — PROGRESS NOTES
SUBJECTIVE:  Raul Abdul, a 41 year old male scheduled an appointment to discuss the following issues:  Concern about mass on testicle - left side is larger than right   No much pain but uncomfortable. No trauma. Golfing a lot.   No biking. No urine changes or hematuria. No std concerns.  Some loose stools.  Active/working.    Medical, social, surgical, and family histories reviewed.    ROS:  All other ROS negative.  OBJECTIVE:  /76   Pulse 102   Temp 98.2  F (36.8  C) (Tympanic)   Ht 1.829 m (6')   Wt 92.1 kg (203 lb)   SpO2 96%   BMI 27.53 kg/m    EXAM:  GENERAL APPEARANCE: healthy, alert and no distress  RESP: lungs clear to auscultation - no rales, rhonchi or wheezes  CV: regular rates and rhythm, normal S1 S2, no S3 or S4 and no murmur, click or rub -  ABDOMEN:  soft, nontender, no HSM or masses and bowel sounds normal  GU_male: testicles normal without atrophy or masses and testicular mass left epidydimal area golf ball sized. Soft/non-tender. No changes with valsalva   PSYCH: mentation appears normal and affect normal/bright    ASSESSMENT / PLAN:  (N50.89) Mass of left testicle  Comment: hopefully hydrocele  Plan: US Testicular and Scrotum, Adult Urology         Referral        Check ultrasound. Urology follow-up if not improving/ worse or concerns on ultrasound. Scrotal support/ice and rest. Expected course and warning signs reviewed. To er if greatly worsening pain. Call/email with questions/concerns     Juan Gonzales MD

## 2021-08-04 ENCOUNTER — ANCILLARY PROCEDURE (OUTPATIENT)
Dept: ULTRASOUND IMAGING | Facility: CLINIC | Age: 41
End: 2021-08-04
Attending: FAMILY MEDICINE
Payer: COMMERCIAL

## 2021-08-04 DIAGNOSIS — N50.89 MASS OF LEFT TESTICLE: ICD-10-CM

## 2021-08-04 PROCEDURE — 76870 US EXAM SCROTUM: CPT | Performed by: RADIOLOGY

## 2021-08-23 ENCOUNTER — OFFICE VISIT (OUTPATIENT)
Dept: UROLOGY | Facility: CLINIC | Age: 41
End: 2021-08-23
Payer: COMMERCIAL

## 2021-08-23 VITALS — HEART RATE: 77 BPM | SYSTOLIC BLOOD PRESSURE: 124 MMHG | OXYGEN SATURATION: 99 % | DIASTOLIC BLOOD PRESSURE: 73 MMHG

## 2021-08-23 DIAGNOSIS — Z30.09 VASECTOMY EVALUATION: Primary | ICD-10-CM

## 2021-08-23 DIAGNOSIS — N43.3 HYDROCELE, LEFT: ICD-10-CM

## 2021-08-23 PROCEDURE — 99243 OFF/OP CNSLTJ NEW/EST LOW 30: CPT | Performed by: UROLOGY

## 2021-08-23 NOTE — PROGRESS NOTES
S: Patient is a 41-year-old male who was requested to be seen by Dr. Juan Gonzales for a consultation with regard to patient's left scrotal swelling.  Patient has noticed left scrotal swelling for several months.  He denies any pain or discomfort.  He denies any trauma to the area.  Recent scrotal ultrasound showed left hydrocele.  He also has interested in vasectomy.  He has 2 children.  Current Outpatient Medications   Medication Sig Dispense Refill     acetaminophen (TYLENOL) 325 MG tablet Take 650 mg by mouth every 6 hours       ibuprofen (ADVIL/MOTRIN) 200 MG tablet Take 600 mg by mouth every 6 hours as needed for mild pain       omeprazole (PRILOSEC) 10 MG DR capsule Take 20 mg by mouth daily       sildenafil (VIAGRA) 50 MG tablet Take 1 tablet (50 mg) by mouth daily as needed 50 tablet 5     No Known Allergies  Past Medical History:   Diagnosis Date     Tobacco abuse      Past Surgical History:   Procedure Laterality Date     AS REPAIR EPIGASTRIC HERNIA,REDUC        Family History   Problem Relation Age of Onset     Diabetes Mother      Hypertension Mother      Atrial fibrillation Mother      Diabetes Father      Hypertension Father      Prostate Cancer Father      Heart Disease Father      Cancer Brother 40        Skin cancer, unsure which type     Social History     Socioeconomic History     Marital status:      Spouse name: None     Number of children: 2     Years of education: None     Highest education level: None   Occupational History     Occupation:    Tobacco Use     Smoking status: Current Every Day Smoker     Packs/day: 1.00     Years: 20.00     Pack years: 20.00     Types: Cigarettes     Smokeless tobacco: Never Used   Substance and Sexual Activity     Alcohol use: Yes     Alcohol/week: 0.0 standard drinks     Comment: Socially, rare. 6 beers in a month     Drug use: No     Sexual activity: Yes     Partners: Female   Other Topics Concern     Parent/sibling w/ CABG, MI or  angioplasty before 65F 55M? Not Asked   Social History Narrative     None     Social Determinants of Health     Financial Resource Strain:      Difficulty of Paying Living Expenses:    Food Insecurity:      Worried About Running Out of Food in the Last Year:      Ran Out of Food in the Last Year:    Transportation Needs:      Lack of Transportation (Medical):      Lack of Transportation (Non-Medical):    Physical Activity:      Days of Exercise per Week:      Minutes of Exercise per Session:    Stress:      Feeling of Stress :    Social Connections:      Frequency of Communication with Friends and Family:      Frequency of Social Gatherings with Friends and Family:      Attends [employer] Services:      Active Member of Clubs or Organizations:      Attends Club or Organization Meetings:      Marital Status:    Intimate Partner Violence:      Fear of Current or Ex-Partner:      Emotionally Abused:      Physically Abused:      Sexually Abused:        REVIEW OF SYSTEMS  =================  C: NEGATIVE for fever, chills, change in weight  I: NEGATIVE for worrisome rashes, moles or lesions  E/M: NEGATIVE for ear, mouth and throat problems  R: NEGATIVE for significant cough or SHORTNESS OF BREATH  CV:  NEGATIVE for chest pain, palpitations or peripheral edema  GI: NEGATIVE for nausea, abdominal pain, heartburn, or change in bowel habits  NEURO: NEGATIVE numbness/weakness  : see HPI  PSYCH: NEGATIVE depression/anxiety  LYmph: no new enlarged lymph nodes  Ortho: no new trauma/movements      Physical Exam:  /73 (BP Location: Right arm, Patient Position: Chair, Cuff Size: Adult Large)   Pulse 77   SpO2 99%    Patient is pleasant, in no acute distress, good general condition.  Heart:  negative, PMI normal  Lung: no evidence of respiratory distress    Abdomen: Soft, nondistended, non tender. No masses. No rebound or guarding.   Exam: Penis no discharge.  Moderate sized left hydrocele.  Right testes normal.  Normal vas  deferens.  No scrotal skin lesion.  Skin: Warm and dry.  No redness.  Neuro: grossly normal  Musculaskeletal: moving all extremities  Psych normal mood and affect  Musculoskeletal  moving all extremities  Hematologic/Lymphatic/Immunologic: normal ant/post cervical, axillary, supraclavicular and inguinal nodes    Assessment/Plan:   #1 hydrocele: Treatment option discussed.  We discussed observation versus surgical drainage.  Pros and cons discussed.  Risks of bleeding, infection, postop pain, recurrence rate discussed.    #2 vasectomy consult: Risks and benefits discussed.  Patient can schedule for procedure if interested.

## 2021-08-31 DIAGNOSIS — N52.9 ERECTILE DYSFUNCTION, UNSPECIFIED ERECTILE DYSFUNCTION TYPE: ICD-10-CM

## 2021-09-01 RX ORDER — SILDENAFIL 50 MG/1
TABLET, FILM COATED ORAL
Qty: 50 TABLET | Refills: 0 | Status: SHIPPED | OUTPATIENT
Start: 2021-09-01 | End: 2022-09-13

## 2021-09-18 ENCOUNTER — HEALTH MAINTENANCE LETTER (OUTPATIENT)
Age: 41
End: 2021-09-18

## 2022-03-05 ENCOUNTER — HEALTH MAINTENANCE LETTER (OUTPATIENT)
Age: 42
End: 2022-03-05

## 2022-04-07 ENCOUNTER — TELEPHONE (OUTPATIENT)
Dept: FAMILY MEDICINE | Facility: CLINIC | Age: 42
End: 2022-04-07

## 2022-04-07 NOTE — TELEPHONE ENCOUNTER
Reason for Call:  Other appointment    Detailed comments: Patient called and stated he needed an appointment to remove hydrocele. Patient stated his primary said he can do this in clinic but his primary had no opening so he got scheudled with Dr. Rocha. Patient would like a call back from care team to see if Mandeep Rocha can do this procedure. Per patient had consult last august.     Phone Number Patient can be reached at: Home number on file [phone] (home)    Best Time: Anytime    Can we leave a detailed message on this number? YES    Call taken on 4/7/2022 at 6:21 PM by Dariana Rocha       Intermediate Repair Preamble Text (Leave Blank If You Do Not Want): Undermining was performed with blunt dissection.

## 2022-04-08 NOTE — TELEPHONE ENCOUNTER
[phone]    Called and informed patient of Dr Gonzales's message. I gave him the phone number to set up an appointment with urology. Cancelled the appointment that was scheduled with DR Rocha.Deena Wade MA/[place]ES

## 2022-04-11 ENCOUNTER — TELEPHONE (OUTPATIENT)
Dept: UROLOGY | Facility: CLINIC | Age: 42
End: 2022-04-11

## 2022-04-11 ENCOUNTER — PREP FOR PROCEDURE (OUTPATIENT)
Dept: UROLOGY | Facility: CLINIC | Age: 42
End: 2022-04-11

## 2022-04-11 DIAGNOSIS — N43.3 HYDROCELE IN ADULT: Primary | ICD-10-CM

## 2022-04-11 NOTE — TELEPHONE ENCOUNTER
Spoke with patient, Offered 4-28-22 he accepted and then declined after being told he would need a COVID test. He states he will speak with his wife and call back if he wishes to proceed.

## 2022-05-20 NOTE — TELEPHONE ENCOUNTER
Reason for call:  Other   Patient called regarding (reason for call): call back  Additional comments: Patient would like to set up a time for his in home sleep study, he stated that he has received his package for the in home study     Phone number to reach patient:  Cell number on file:    Telephone Information:   Mobile [phone]       Best Time:  4/13/21  Anytime after 1 pm  4/14/ anytime after 2 pm  4/15 anytime after 12 pm    Can we leave a detailed message on this number?  YES    Travel screening: Not Applicable   ILR explant

## 2022-08-19 ENCOUNTER — APPOINTMENT (OUTPATIENT)
Dept: ULTRASOUND IMAGING | Facility: CLINIC | Age: 42
End: 2022-08-19
Attending: EMERGENCY MEDICINE
Payer: COMMERCIAL

## 2022-08-19 ENCOUNTER — APPOINTMENT (OUTPATIENT)
Dept: CT IMAGING | Facility: CLINIC | Age: 42
End: 2022-08-19
Attending: EMERGENCY MEDICINE
Payer: COMMERCIAL

## 2022-08-19 ENCOUNTER — HOSPITAL ENCOUNTER (OUTPATIENT)
Facility: CLINIC | Age: 42
Setting detail: OBSERVATION
Discharge: HOME | End: 2022-08-20
Attending: EMERGENCY MEDICINE | Admitting: SURGERY
Payer: COMMERCIAL

## 2022-08-19 DIAGNOSIS — Z11.52 ENCOUNTER FOR SCREENING LABORATORY TESTING FOR COVID-19 VIRUS: ICD-10-CM

## 2022-08-19 DIAGNOSIS — K81.0 ACUTE CHOLECYSTITIS: Primary | ICD-10-CM

## 2022-08-19 DIAGNOSIS — Z90.49 S/P LAPAROSCOPIC CHOLECYSTECTOMY: ICD-10-CM

## 2022-08-19 LAB
ALBUMIN SERPL-MCNC: 4 G/DL (ref 3.4–5)
ALBUMIN UR-MCNC: ABNORMAL MG/DL
ALP SERPL-CCNC: 95 U/L (ref 40–150)
ALT SERPL W P-5'-P-CCNC: 24 U/L (ref 0–70)
ANION GAP SERPL CALCULATED.3IONS-SCNC: 7 MMOL/L (ref 3–14)
APPEARANCE UR: CLEAR
AST SERPL W P-5'-P-CCNC: 16 U/L (ref 0–45)
BACTERIA #/AREA URNS HPF: ABNORMAL /HPF
BASOPHILS # BLD AUTO: 0.1 10E3/UL (ref 0–0.2)
BASOPHILS NFR BLD AUTO: 1 %
BILIRUB SERPL-MCNC: 0.2 MG/DL (ref 0.2–1.3)
BILIRUB UR QL STRIP: NEGATIVE
BUN SERPL-MCNC: 7 MG/DL (ref 7–30)
CALCIUM SERPL-MCNC: 9 MG/DL (ref 8.5–10.1)
CHLORIDE BLD-SCNC: 103 MMOL/L (ref 94–109)
CO2 SERPL-SCNC: 27 MMOL/L (ref 20–32)
COLOR UR AUTO: ABNORMAL
CREAT SERPL-MCNC: 0.78 MG/DL (ref 0.66–1.25)
EOSINOPHIL # BLD AUTO: 0.2 10E3/UL (ref 0–0.7)
EOSINOPHIL NFR BLD AUTO: 3 %
ERYTHROCYTE [DISTWIDTH] IN BLOOD BY AUTOMATED COUNT: 12.4 % (ref 10–15)
GFR SERPL CREATININE-BSD FRML MDRD: >90 ML/MIN/1.73M2
GLUCOSE BLD-MCNC: 114 MG/DL (ref 70–99)
GLUCOSE UR STRIP-MCNC: NEGATIVE MG/DL
HCT VFR BLD AUTO: 42.7 % (ref 40–53)
HGB BLD-MCNC: 14.9 G/DL (ref 13.3–17.7)
HGB UR QL STRIP: ABNORMAL
HOLD SPECIMEN: NORMAL
IMM GRANULOCYTES # BLD: 0 10E3/UL
IMM GRANULOCYTES NFR BLD: 0 %
KETONES UR STRIP-MCNC: NEGATIVE MG/DL
LEUKOCYTE ESTERASE UR QL STRIP: NEGATIVE
LIPASE SERPL-CCNC: 64 U/L (ref 73–393)
LYMPHOCYTES # BLD AUTO: 3.3 10E3/UL (ref 0.8–5.3)
LYMPHOCYTES NFR BLD AUTO: 44 %
MCH RBC QN AUTO: 31.6 PG (ref 26.5–33)
MCHC RBC AUTO-ENTMCNC: 34.9 G/DL (ref 31.5–36.5)
MCV RBC AUTO: 91 FL (ref 78–100)
MONOCYTES # BLD AUTO: 0.5 10E3/UL (ref 0–1.3)
MONOCYTES NFR BLD AUTO: 7 %
MUCOUS THREADS #/AREA URNS LPF: PRESENT /LPF
NEUTROPHILS # BLD AUTO: 3.4 10E3/UL (ref 1.6–8.3)
NEUTROPHILS NFR BLD AUTO: 45 %
NITRATE UR QL: NEGATIVE
NRBC # BLD AUTO: 0 10E3/UL
NRBC BLD AUTO-RTO: 0 /100
PH UR STRIP: 7.5 [PH] (ref 5–7)
PLATELET # BLD AUTO: 302 10E3/UL (ref 150–450)
POTASSIUM BLD-SCNC: 2.9 MMOL/L (ref 3.4–5.3)
PROT SERPL-MCNC: 7.6 G/DL (ref 6.8–8.8)
RBC # BLD AUTO: 4.72 10E6/UL (ref 4.4–5.9)
RBC URINE: 5 /HPF
SARS-COV-2 RNA RESP QL NAA+PROBE: NEGATIVE
SODIUM SERPL-SCNC: 137 MMOL/L (ref 133–144)
SP GR UR STRIP: 1.01 (ref 1–1.03)
TROPONIN I SERPL HS-MCNC: 5 NG/L
UROBILINOGEN UR STRIP-MCNC: NORMAL MG/DL
WBC # BLD AUTO: 7.4 10E3/UL (ref 4–11)
WBC URINE: 0 /HPF

## 2022-08-19 PROCEDURE — 93005 ELECTROCARDIOGRAM TRACING: CPT | Performed by: EMERGENCY MEDICINE

## 2022-08-19 PROCEDURE — 96376 TX/PRO/DX INJ SAME DRUG ADON: CPT | Performed by: EMERGENCY MEDICINE

## 2022-08-19 PROCEDURE — 36415 COLL VENOUS BLD VENIPUNCTURE: CPT | Performed by: EMERGENCY MEDICINE

## 2022-08-19 PROCEDURE — 250N000011 HC RX IP 250 OP 636

## 2022-08-19 PROCEDURE — 96361 HYDRATE IV INFUSION ADD-ON: CPT | Performed by: EMERGENCY MEDICINE

## 2022-08-19 PROCEDURE — 93010 ELECTROCARDIOGRAM REPORT: CPT | Performed by: EMERGENCY MEDICINE

## 2022-08-19 PROCEDURE — G0378 HOSPITAL OBSERVATION PER HR: HCPCS

## 2022-08-19 PROCEDURE — 96365 THER/PROPH/DIAG IV INF INIT: CPT | Mod: 59 | Performed by: EMERGENCY MEDICINE

## 2022-08-19 PROCEDURE — 258N000003 HC RX IP 258 OP 636: Performed by: SURGERY

## 2022-08-19 PROCEDURE — 74177 CT ABD & PELVIS W/CONTRAST: CPT

## 2022-08-19 PROCEDURE — 83690 ASSAY OF LIPASE: CPT | Performed by: EMERGENCY MEDICINE

## 2022-08-19 PROCEDURE — 99285 EMERGENCY DEPT VISIT HI MDM: CPT | Mod: 25 | Performed by: EMERGENCY MEDICINE

## 2022-08-19 PROCEDURE — 250N000011 HC RX IP 250 OP 636: Performed by: EMERGENCY MEDICINE

## 2022-08-19 PROCEDURE — 80053 COMPREHEN METABOLIC PANEL: CPT | Performed by: EMERGENCY MEDICINE

## 2022-08-19 PROCEDURE — 99285 EMERGENCY DEPT VISIT HI MDM: CPT | Mod: CS | Performed by: EMERGENCY MEDICINE

## 2022-08-19 PROCEDURE — 87635 SARS-COV-2 COVID-19 AMP PRB: CPT | Performed by: SURGERY

## 2022-08-19 PROCEDURE — 258N000003 HC RX IP 258 OP 636: Performed by: EMERGENCY MEDICINE

## 2022-08-19 PROCEDURE — 96375 TX/PRO/DX INJ NEW DRUG ADDON: CPT

## 2022-08-19 PROCEDURE — 96361 HYDRATE IV INFUSION ADD-ON: CPT

## 2022-08-19 PROCEDURE — 81001 URINALYSIS AUTO W/SCOPE: CPT | Performed by: SURGERY

## 2022-08-19 PROCEDURE — C9803 HOPD COVID-19 SPEC COLLECT: HCPCS | Performed by: EMERGENCY MEDICINE

## 2022-08-19 PROCEDURE — 250N000011 HC RX IP 250 OP 636: Performed by: SURGERY

## 2022-08-19 PROCEDURE — 250N000009 HC RX 250: Performed by: EMERGENCY MEDICINE

## 2022-08-19 PROCEDURE — 84484 ASSAY OF TROPONIN QUANT: CPT | Performed by: EMERGENCY MEDICINE

## 2022-08-19 PROCEDURE — 85025 COMPLETE CBC W/AUTO DIFF WBC: CPT | Performed by: EMERGENCY MEDICINE

## 2022-08-19 PROCEDURE — 96375 TX/PRO/DX INJ NEW DRUG ADDON: CPT | Mod: 59 | Performed by: EMERGENCY MEDICINE

## 2022-08-19 PROCEDURE — 96376 TX/PRO/DX INJ SAME DRUG ADON: CPT

## 2022-08-19 PROCEDURE — 76705 ECHO EXAM OF ABDOMEN: CPT

## 2022-08-19 RX ORDER — HYDROMORPHONE HYDROCHLORIDE 1 MG/ML
0.5 INJECTION, SOLUTION INTRAMUSCULAR; INTRAVENOUS; SUBCUTANEOUS ONCE
Status: COMPLETED | OUTPATIENT
Start: 2022-08-19 | End: 2022-08-19

## 2022-08-19 RX ORDER — HYDROMORPHONE HYDROCHLORIDE 1 MG/ML
0.3 INJECTION, SOLUTION INTRAMUSCULAR; INTRAVENOUS; SUBCUTANEOUS ONCE
Status: COMPLETED | OUTPATIENT
Start: 2022-08-19 | End: 2022-08-19

## 2022-08-19 RX ORDER — NALOXONE HYDROCHLORIDE 0.4 MG/ML
0.2 INJECTION, SOLUTION INTRAMUSCULAR; INTRAVENOUS; SUBCUTANEOUS
Status: DISCONTINUED | OUTPATIENT
Start: 2022-08-19 | End: 2022-08-20 | Stop reason: HOSPADM

## 2022-08-19 RX ORDER — ONDANSETRON 2 MG/ML
4 INJECTION INTRAMUSCULAR; INTRAVENOUS EVERY 6 HOURS PRN
Status: DISCONTINUED | OUTPATIENT
Start: 2022-08-19 | End: 2022-08-20 | Stop reason: HOSPADM

## 2022-08-19 RX ORDER — ONDANSETRON 2 MG/ML
4 INJECTION INTRAMUSCULAR; INTRAVENOUS ONCE
Status: COMPLETED | OUTPATIENT
Start: 2022-08-19 | End: 2022-08-19

## 2022-08-19 RX ORDER — KETOROLAC TROMETHAMINE 15 MG/ML
15 INJECTION, SOLUTION INTRAMUSCULAR; INTRAVENOUS ONCE
Status: COMPLETED | OUTPATIENT
Start: 2022-08-19 | End: 2022-08-19

## 2022-08-19 RX ORDER — NALOXONE HYDROCHLORIDE 0.4 MG/ML
0.4 INJECTION, SOLUTION INTRAMUSCULAR; INTRAVENOUS; SUBCUTANEOUS
Status: DISCONTINUED | OUTPATIENT
Start: 2022-08-19 | End: 2022-08-20 | Stop reason: HOSPADM

## 2022-08-19 RX ORDER — POTASSIUM CHLORIDE 7.45 MG/ML
10 INJECTION INTRAVENOUS ONCE
Status: COMPLETED | OUTPATIENT
Start: 2022-08-19 | End: 2022-08-19

## 2022-08-19 RX ORDER — IOPAMIDOL 755 MG/ML
85 INJECTION, SOLUTION INTRAVASCULAR ONCE
Status: COMPLETED | OUTPATIENT
Start: 2022-08-19 | End: 2022-08-19

## 2022-08-19 RX ORDER — FENTANYL CITRATE 50 UG/ML
75 INJECTION, SOLUTION INTRAMUSCULAR; INTRAVENOUS ONCE
Status: COMPLETED | OUTPATIENT
Start: 2022-08-19 | End: 2022-08-19

## 2022-08-19 RX ORDER — ONDANSETRON 4 MG/1
4 TABLET, ORALLY DISINTEGRATING ORAL EVERY 6 HOURS PRN
Status: DISCONTINUED | OUTPATIENT
Start: 2022-08-19 | End: 2022-08-20 | Stop reason: HOSPADM

## 2022-08-19 RX ORDER — CEFOTETAN DISODIUM 2 G/20ML
2 INJECTION, POWDER, FOR SOLUTION INTRAMUSCULAR; INTRAVENOUS EVERY 12 HOURS
Status: DISCONTINUED | OUTPATIENT
Start: 2022-08-19 | End: 2022-08-20

## 2022-08-19 RX ORDER — ONDANSETRON 2 MG/ML
INJECTION INTRAMUSCULAR; INTRAVENOUS
Status: COMPLETED
Start: 2022-08-19 | End: 2022-08-19

## 2022-08-19 RX ORDER — KETOROLAC TROMETHAMINE 30 MG/ML
30 INJECTION, SOLUTION INTRAMUSCULAR; INTRAVENOUS EVERY 6 HOURS PRN
Status: DISCONTINUED | OUTPATIENT
Start: 2022-08-19 | End: 2022-08-20

## 2022-08-19 RX ORDER — SODIUM CHLORIDE 9 MG/ML
INJECTION, SOLUTION INTRAVENOUS CONTINUOUS
Status: DISCONTINUED | OUTPATIENT
Start: 2022-08-19 | End: 2022-08-20

## 2022-08-19 RX ORDER — CEFTRIAXONE 2 G/1
2 INJECTION, POWDER, FOR SOLUTION INTRAMUSCULAR; INTRAVENOUS ONCE
Status: DISCONTINUED | OUTPATIENT
Start: 2022-08-19 | End: 2022-08-19 | Stop reason: ALTCHOICE

## 2022-08-19 RX ORDER — HYDROMORPHONE HYDROCHLORIDE 1 MG/ML
.3-.5 INJECTION, SOLUTION INTRAMUSCULAR; INTRAVENOUS; SUBCUTANEOUS
Status: DISCONTINUED | OUTPATIENT
Start: 2022-08-19 | End: 2022-08-20 | Stop reason: ALTCHOICE

## 2022-08-19 RX ADMIN — HYDROMORPHONE HYDROCHLORIDE 0.3 MG: 1 INJECTION, SOLUTION INTRAMUSCULAR; INTRAVENOUS; SUBCUTANEOUS at 15:40

## 2022-08-19 RX ADMIN — FENTANYL CITRATE 75 MCG: 50 INJECTION, SOLUTION INTRAMUSCULAR; INTRAVENOUS at 16:33

## 2022-08-19 RX ADMIN — CEFOTETAN DISODIUM 2 G: 2 INJECTION, POWDER, FOR SOLUTION INTRAMUSCULAR; INTRAVENOUS at 18:56

## 2022-08-19 RX ADMIN — KETOROLAC TROMETHAMINE 30 MG: 30 INJECTION, SOLUTION INTRAMUSCULAR at 21:13

## 2022-08-19 RX ADMIN — HYDROMORPHONE HYDROCHLORIDE 0.5 MG: 1 INJECTION, SOLUTION INTRAMUSCULAR; INTRAVENOUS; SUBCUTANEOUS at 14:49

## 2022-08-19 RX ADMIN — ONDANSETRON 4 MG: 2 INJECTION INTRAMUSCULAR; INTRAVENOUS at 13:38

## 2022-08-19 RX ADMIN — POTASSIUM CHLORIDE 10 MEQ: 7.46 INJECTION, SOLUTION INTRAVENOUS at 18:57

## 2022-08-19 RX ADMIN — KETOROLAC TROMETHAMINE 15 MG: 15 INJECTION, SOLUTION INTRAMUSCULAR; INTRAVENOUS at 15:34

## 2022-08-19 RX ADMIN — SODIUM CHLORIDE: 9 INJECTION, SOLUTION INTRAVENOUS at 21:08

## 2022-08-19 RX ADMIN — IOPAMIDOL 85 ML: 755 INJECTION, SOLUTION INTRAVENOUS at 14:55

## 2022-08-19 RX ADMIN — PROMETHAZINE HYDROCHLORIDE 12.5 MG: 25 INJECTION INTRAMUSCULAR; INTRAVENOUS at 21:19

## 2022-08-19 RX ADMIN — HYDROMORPHONE HYDROCHLORIDE 0.5 MG: 1 INJECTION, SOLUTION INTRAMUSCULAR; INTRAVENOUS; SUBCUTANEOUS at 21:26

## 2022-08-19 RX ADMIN — POTASSIUM CHLORIDE 10 MEQ: 7.46 INJECTION, SOLUTION INTRAVENOUS at 15:38

## 2022-08-19 RX ADMIN — HYDROMORPHONE HYDROCHLORIDE 0.5 MG: 1 INJECTION, SOLUTION INTRAMUSCULAR; INTRAVENOUS; SUBCUTANEOUS at 19:17

## 2022-08-19 RX ADMIN — SODIUM CHLORIDE 1000 ML: 9 INJECTION, SOLUTION INTRAVENOUS at 15:38

## 2022-08-19 RX ADMIN — SODIUM CHLORIDE 64 ML: 9 INJECTION, SOLUTION INTRAVENOUS at 14:57

## 2022-08-19 RX ADMIN — ONDANSETRON 4 MG: 2 INJECTION INTRAMUSCULAR; INTRAVENOUS at 15:39

## 2022-08-19 RX ADMIN — ONDANSETRON 4 MG: 2 INJECTION INTRAMUSCULAR; INTRAVENOUS at 18:54

## 2022-08-19 ASSESSMENT — ACTIVITIES OF DAILY LIVING (ADL)
ADLS_ACUITY_SCORE: 33
ADLS_ACUITY_SCORE: 35
ADLS_ACUITY_SCORE: 33
ADLS_ACUITY_SCORE: 35
ADLS_ACUITY_SCORE: 35

## 2022-08-19 NOTE — ED PROVIDER NOTES
History     Chief Complaint   Patient presents with     Abdominal Pain     Sudden onset epigastric pain with nausea.     HPI  Raul Abdul is a 42 year old male with past medical history significant for previous hydrocele who presents emergency department complaining of left upper quadrant abdominal pain pain began roughly 3 hours ago sudden onset.  Patient is nauseated but has not vomited the pain is very sharp and intense he rates it a 9 out of 10 it is point and radiates into his back.  He denies any lower abdominal pain and has not had any urinary symptoms.  He denies any fevers or chills has not had any recent illness.  Denies any headache or visual changes.  Denies any chest pain or shortness of breath.  He is not having significant flank pain.  Denies any bowel or bladder dysfunction.  Has not had any leg swelling calf pain.  Denies any rash.    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     Priority: Medium     Dyshidrotic dermatitis 09/18/2012     Priority: Medium        Past Medical History:    Past Medical History:   Diagnosis Date     Tobacco abuse        Past Surgical History:    Past Surgical History:   Procedure Laterality Date     AS REPAIR EPIGASTRIC HERNIA,REDUC         Family History:    Family History   Problem Relation Age of Onset     Diabetes Mother      Hypertension Mother      Atrial fibrillation Mother      Diabetes Father      Hypertension Father      Prostate Cancer Father      Heart Disease Father      Cancer Brother 40        Skin cancer, unsure which type       Social History:  Marital Status:   [2]  Social History     Tobacco Use     Smoking status: Current Every Day Smoker     Packs/day: 1.00     Years: 20.00      Pack years: 20.00     Types: Cigarettes     Smokeless tobacco: Never Used   Substance Use Topics     Alcohol use: Yes     Alcohol/week: 0.0 standard drinks     Comment: Socially, rare. 6 beers in a month     Drug use: No        Medications:    acetaminophen (TYLENOL) 325 MG tablet  ibuprofen (ADVIL/MOTRIN) 200 MG tablet  omeprazole (PRILOSEC) 10 MG DR capsule  sildenafil (VIAGRA) 50 MG tablet          Review of Systems  All systems reviewed and other than pertinent positives negatives in HPI all other systems are negative.  Physical Exam   BP: 135/83  Pulse: 77  Temp: 97.2  F (36.2  C)  Resp: 18  Height: 182.9 cm (6')  Weight: 88.5 kg (195 lb)  SpO2: 98 %      Physical Exam  Vitals and nursing note reviewed.   Constitutional:       Appearance: He is well-developed. He is not ill-appearing, toxic-appearing or diaphoretic.      Comments: Moderate distress secondary to abdominal pain.   HENT:      Head: Normocephalic and atraumatic.      Nose: Nose normal.      Mouth/Throat:      Mouth: Mucous membranes are moist.      Pharynx: Oropharynx is clear.   Cardiovascular:      Rate and Rhythm: Normal rate and regular rhythm.      Pulses: Normal pulses.      Heart sounds: Normal heart sounds. No murmur heard.    No friction rub.   Pulmonary:      Effort: Pulmonary effort is normal.      Breath sounds: Normal breath sounds. No wheezing or rhonchi.   Abdominal:      Comments: Soft, nondistended, tender to palpation of the left upper quadrant.  Mild guarding mild rebound bowel sounds slightly hyperactive.  There is no lower abdominal pain.  No significant flank pain no obvious hernia or masses are palpable.   Musculoskeletal:         General: No swelling or tenderness. Normal range of motion.      Cervical back: Normal range of motion and neck supple.      Right lower leg: No edema.      Left lower leg: No edema.   Skin:     General: Skin is warm and dry.      Capillary Refill: Capillary refill takes less than 2 seconds.    Neurological:      General: No focal deficit present.      Mental Status: He is alert and oriented to person, place, and time.      Sensory: No sensory deficit.      Motor: No weakness.      Coordination: Coordination normal.   Psychiatric:         Mood and Affect: Mood normal.         ED Course                 Procedures              EKG Interpretation:      Interpreted by Mandeep Kumar MD  Rhythm: normal sinus   Rate: Normal  Axis: Normal  Ectopy: none  Conduction: normal  ST Segments/ T Waves: Non-specific ST-T wave changes  Q Waves: nonspecific  Comparison to prior: No old EKG available    Clinical Impression: Normal sinus rhythm with nonspecific ST-T wave changes.      Critical Care time:  none               Results for orders placed or performed during the hospital encounter of 08/19/22 (from the past 24 hour(s))   [facility] Draw    Narrative    The following orders were created for panel order [facility] Draw.  Procedure                               Abnormality         Status                     ---------                               -----------         ------                     Extra Blue Top Tube[[id number]]                              In process                 Extra Red Top Tube[[id number]]                               In process                 Extra Green Top (Lithium...[[id number]]                      In process                 Extra Purple Top Tube[[id number]]                            In process                   Please view results for these tests on the individual orders.   CBC with Platelets & Differential    Narrative    The following orders were created for panel order CBC with Platelets & Differential.  Procedure                               Abnormality         Status                     ---------                               -----------         ------                     CBC with platelets and d...[[id number]]                      Final result                 Please view results for  these tests on the individual orders.   Troponin I   Result Value Ref Range    Troponin I High Sensitivity 5 <79 ng/L   Comprehensive metabolic panel   Result Value Ref Range    Sodium 137 133 - 144 mmol/L    Potassium 2.9 (L) 3.4 - 5.3 mmol/L    Chloride 103 94 - 109 mmol/L    Carbon Dioxide (CO2) 27 20 - 32 mmol/L    Anion Gap 7 3 - 14 mmol/L    Urea Nitrogen 7 7 - 30 mg/dL    Creatinine 0.78 0.66 - 1.25 mg/dL    Calcium 9.0 8.5 - 10.1 mg/dL    Glucose 114 (H) 70 - 99 mg/dL    Alkaline Phosphatase 95 40 - 150 U/L    AST 16 0 - 45 U/L    ALT 24 0 - 70 U/L    Protein Total 7.6 6.8 - 8.8 g/dL    Albumin 4.0 3.4 - 5.0 g/dL    Bilirubin Total 0.2 0.2 - 1.3 mg/dL    GFR Estimate >90 >60 mL/min/1.73m2   Lipase   Result Value Ref Range    Lipase 64 (L) 73 - 393 U/L   CBC with platelets and differential   Result Value Ref Range    WBC Count 7.4 4.0 - 11.0 10e3/uL    RBC Count 4.72 4.40 - 5.90 10e6/uL    Hemoglobin 14.9 13.3 - 17.7 g/dL    Hematocrit 42.7 40.0 - 53.0 %    MCV 91 78 - 100 fL    MCH 31.6 26.5 - 33.0 pg    MCHC 34.9 31.5 - 36.5 g/dL    RDW 12.4 10.0 - 15.0 %    Platelet Count 302 150 - 450 10e3/uL    % Neutrophils 45 %    % Lymphocytes 44 %    % Monocytes 7 %    % Eosinophils 3 %    % Basophils 1 %    % Immature Granulocytes 0 %    NRBCs per 100 WBC 0 <1 /100    Absolute Neutrophils 3.4 1.6 - 8.3 10e3/uL    Absolute Lymphocytes 3.3 0.8 - 5.3 10e3/uL    Absolute Monocytes 0.5 0.0 - 1.3 10e3/uL    Absolute Eosinophils 0.2 0.0 - 0.7 10e3/uL    Absolute Basophils 0.1 0.0 - 0.2 10e3/uL    Absolute Immature Granulocytes 0.0 <=0.4 10e3/uL    Absolute NRBCs 0.0 10e3/uL       Medications   ondansetron (ZOFRAN) injection 4 mg (has no administration in time range)   HYDROmorphone (PF) (DILAUDID) injection 0.5 mg (has no administration in time range)   0.9% sodium chloride BOLUS (has no administration in time range)   potassium chloride 10 mEq in 100 mL sterile water intermittent infusion (premix) (has no administration  "in time range)   ondansetron (ZOFRAN) injection 4 mg (4 mg Intravenous Given 8/19/22 1338)       Assessments & Plan (with Medical Decision Making) records were reviewed.  Labs were obtained.  Patient was given Dilaudid and Zofran.  EKG without significant abnormality.  Patient's white count was 7.4 hemoglobin 14.9.  Comprehensive metabolic panel unremarkable.  Lipase was 64 UA without significant abnormality.  Troponin was normal.  Patient required further pain medications.  A CT scan of the abdomen pelvis was obtained and revealed cholelithiasis with mild hazy attenuation surrounding the gallbladder which may indicate acute cholecystitis.  Right upper quadrant ultrasound was recommended.  Ultrasound revealed multiple gallstones including 1 in the region of the gallbladder neck with mild gallbladder wall distention and edematous gallbladder wall thickening findings compatible with a \"acute cholecystitis.  Again continuing to have pain and requiring further pain medication.  Discussed case with Dr. Ritter general surgery at [facility] and she would like to admit the patient to the hospital for laparoscopic cholecystectomy in the a.m.  Patient was covered with cefotetan.  COVID test was negative.  Findings and plan discussed with patient and his wife throughout his stay and they are in agreement with admission.     I have reviewed the nursing notes.    I have reviewed the findings, diagnosis, plan and need for follow up with the patient.       New Prescriptions    No medications on file       Final diagnoses:   Acute cholecystitis   S/P laparoscopic cholecystectomy       8/19/2022   [facility] EMERGENCY DEPT     Mandeep Kumar MD  08/21/22 1116    "

## 2022-08-19 NOTE — ED NOTES
Arrival after epigastric pain began abruptly for 1 hour this morning. He reports eating poorly yesterday and this morning. He did have dark loose stool yesterday. Upper abdominal pain is most tender at epigastrum.

## 2022-08-19 NOTE — ED NOTES
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     Priority: Medium     Dyshidrotic dermatitis 09/18/2012     Priority: Medium    (K81.0) Acute cholecystitis  (primary encounter diagnosis)  Comment:   Plan: Case Request: CHOLECYSTECTOMY, LAPAROSCOPIC,         possible open                Needed?: No    Allergies:  No Known Allergies    Past Medical Hx:   Past Medical History:   Diagnosis Date     Tobacco abuse        Vital Signs:  /86   Pulse 67   Temp 97.2  F (36.2  C) (Tympanic)   Resp 15   Ht 1.829 m (6')   Wt 88.5 kg (195 lb)   SpO2 (!) 89%   BMI 26.45 kg/m       Elimination Status: Continent: Yes     Activity Level: Independent    Baseline Mental status: WDL  Current Mental Status changes: at basesline    Infection present or suspected this encounter: no  Sepsis suspected: No  Isolation type: N/A  Patient tested for COVID 19 prior to admission: YES     Activity level - Baseline/Home:  Independent  Activity Level - Current:   Independent    Bariatric equipment needed?: No    In the ED these meds were given:   Medications   cefoTEtan (CEFOTAN) 2 g vial to attach to  ml bag (has no administration in time range)   potassium chloride 10 mEq in 100 mL sterile water intermittent infusion (premix) (has no administration in time range)   ondansetron (ZOFRAN) injection 4 mg (has no administration in time range)   ondansetron (ZOFRAN) injection 4 mg (4 mg Intravenous Given 8/19/22 8774)    ondansetron (ZOFRAN) injection 4 mg (4 mg Intravenous Given 8/19/22 1539)   HYDROmorphone (PF) (DILAUDID) injection 0.5 mg (0.5 mg Intravenous Given 8/19/22 1449)   0.9% sodium chloride BOLUS (0 mLs Intravenous Stopped 8/19/22 1847)   potassium chloride 10 mEq in 100 mL sterile water intermittent infusion (premix) (0 mEq Intravenous Stopped 8/19/22 1639)   iopamidol (ISOVUE-370) solution 85 mL (85 mLs Intravenous Given 8/19/22 1455)   sodium chloride 0.9 % bag 500mL for CT scan flush use (64 mLs Intravenous Given 8/19/22 1457)   ketorolac (TORADOL) injection 15 mg (15 mg Intravenous Given 8/19/22 1534)   HYDROmorphone (PF) (DILAUDID) injection 0.3 mg (0.3 mg Intravenous Given 8/19/22 1540)   fentaNYL (PF) (SUBLIMAZE) injection 75 mcg (75 mcg Intravenous Given 8/19/22 1633)       Drips running?  Yes    Home pump  No    Current LDAs: (Line status:834671)    Labs results:   Labs Ordered and Resulted from Time of ED Arrival Up to the Time of Departure from the ED  Results for orders placed or performed during the hospital encounter of 08/19/22 (from the past 24 hour(s))   Donora Draw    Narrative    The following orders were created for panel order Donora Draw.  Procedure                               Abnormality         Status                     ---------                               -----------         ------                     Extra Blue Top Tube[[id number]]                              Final result               Extra Red Top Tube[[id number]]                               Final result               Extra Green Top (Lithium...[[id number]]                      Final result               Extra Purple Top Tube[[id number]]                            Final result                 Please view results for these tests on the individual orders.   Extra Blue Top Tube   Result Value Ref Range    Hold Specimen JIC    Extra Red Top Tube   Result Value Ref Range    Hold Specimen JIC    Extra Green Top (Lithium Heparin) Tube    Result Value Ref Range    Hold Specimen JIC    Extra Purple Top Tube   Result Value Ref Range    Hold Specimen JIC    CBC with Platelets & Differential    Narrative    The following orders were created for panel order CBC with Platelets & Differential.  Procedure                               Abnormality         Status                     ---------                               -----------         ------                     CBC with platelets and d...[[id number]]                      Final result                 Please view results for these tests on the individual orders.   Troponin I   Result Value Ref Range    Troponin I High Sensitivity 5 <79 ng/L   Comprehensive metabolic panel   Result Value Ref Range    Sodium 137 133 - 144 mmol/L    Potassium 2.9 (L) 3.4 - 5.3 mmol/L    Chloride 103 94 - 109 mmol/L    Carbon Dioxide (CO2) 27 20 - 32 mmol/L    Anion Gap 7 3 - 14 mmol/L    Urea Nitrogen 7 7 - 30 mg/dL    Creatinine 0.78 0.66 - 1.25 mg/dL    Calcium 9.0 8.5 - 10.1 mg/dL    Glucose 114 (H) 70 - 99 mg/dL    Alkaline Phosphatase 95 40 - 150 U/L    AST 16 0 - 45 U/L    ALT 24 0 - 70 U/L    Protein Total 7.6 6.8 - 8.8 g/dL    Albumin 4.0 3.4 - 5.0 g/dL    Bilirubin Total 0.2 0.2 - 1.3 mg/dL    GFR Estimate >90 >60 mL/min/1.73m2   Lipase   Result Value Ref Range    Lipase 64 (L) 73 - 393 U/L   CBC with platelets and differential   Result Value Ref Range    WBC Count 7.4 4.0 - 11.0 10e3/uL    RBC Count 4.72 4.40 - 5.90 10e6/uL    Hemoglobin 14.9 13.3 - 17.7 g/dL    Hematocrit 42.7 40.0 - 53.0 %    MCV 91 78 - 100 fL    MCH 31.6 26.5 - 33.0 pg    MCHC 34.9 31.5 - 36.5 g/dL    RDW 12.4 10.0 - 15.0 %    Platelet Count 302 150 - 450 10e3/uL    % Neutrophils 45 %    % Lymphocytes 44 %    % Monocytes 7 %    % Eosinophils 3 %    % Basophils 1 %    % Immature Granulocytes 0 %    NRBCs per 100 WBC 0 <1 /100    Absolute Neutrophils 3.4 1.6 - 8.3 10e3/uL    Absolute Lymphocytes 3.3 0.8 - 5.3 10e3/uL    Absolute Monocytes 0.5  0.0 - 1.3 10e3/uL    Absolute Eosinophils 0.2 0.0 - 0.7 10e3/uL    Absolute Basophils 0.1 0.0 - 0.2 10e3/uL    Absolute Immature Granulocytes 0.0 <=0.4 10e3/uL    Absolute NRBCs 0.0 10e3/uL   CT Abdomen Pelvis w Contrast    Narrative    CT ABDOMEN PELVIS W CONTRAST 8/19/2022 3:13 PM    CLINICAL HISTORY: LUQ abdominal pain    TECHNIQUE: CT scan of the abdomen and pelvis was performed following  injection of IV contrast. Multiplanar reformats were obtained. Dose  reduction techniques were used.  CONTRAST: 85mL isovue 370    COMPARISON: None.    FINDINGS:   LOWER CHEST: Normal.    HEPATOBILIARY: Cholelithiasis. There is mild hazy attenuation about  the gallbladder suggesting acute cholecystitis. No focal liver  lesions.    PANCREAS: Normal.    SPLEEN: Normal.    ADRENAL GLANDS: Normal.    KIDNEYS/BLADDER: Few small benign cysts in the right kidney. No  follow-up needed.    BOWEL: Normal.    PELVIC ORGANS: Mild enlargement of prostate gland.    ADDITIONAL FINDINGS: No adenopathy. 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  Mild calcified plaque in the abdominal aorta.    MUSCULOSKELETAL: Normal.      Impression    IMPRESSION:   1.  Cholelithiasis. There is mild hazy attenuation surrounding the  gallbladder. This is may indicate acute cholecystitis or less likely  choledocholithiasis. Consider initial further evaluation with  ultrasound and correlation with hepatobiliary markers.    NIKKIE MULLER MD         SYSTEM ID:  [id number]   Abdomen US, limited (RUQ only)    Narrative    EXAM: US ABDOMEN LIMITED  LOCATION: [facility]  DATE/TIME: 8/19/2022 4:38 PM    INDICATION: Epigastric pain.  COMPARISON: 08/19/2022.  TECHNIQUE: Limited abdominal ultrasound.    FINDINGS:    GALLBLADDER: Gallbladder wall thickening with multiple stones. Edematous change in the gallbladder wall. There is a stone in the region of the gallbladder neck. Patient is  tender over the gallbladder, and findings compatible with acute cholecystitis.    BILE DUCTS: No biliary dilatation. The common duct measures 2.5 mm.    LIVER: Normal parenchyma with smooth contour. No focal mass.    RIGHT KIDNEY: No hydronephrosis. Simple cyst right kidney. No follow-up needed.    PANCREAS: The visualized portions are normal.    No ascites.      Impression    IMPRESSION: Normal limited abdominal ultrasound. Multiple gallstones including one in the region of the gallbladder neck with mild gallbladder wall distention and edematous gallbladder wall thickening. Patient is tender over the gallbladder, and findings   compatible with acute cholecystitis.    NOTE: ABNORMAL REPORT    THE DICTATION ABOVE DESCRIBES AN ABNORMALITY FOR WHICH FOLLOW-UP IS NEEDED.       Imaging Studies:   Recent Results (from the past 24 hour(s))  @[email or web address]@    Recent vital signs: BP: [unfilled], T: 97.2, HR: 67, R: 15     Cherry Coma Scale Score:    GCS:   Motor 6=Obeys commands   Verbal 5=Oriented   Eye Opening 4=Spontaneous   Total: 15        Risk for violent behavior: No     Cardiac Rhythm: Normal Sinus  Pt needs tele? No  Skin/wound Issues: None    Code Status: Full Code    Pain control: fair    Nausea control: fair    Abnormal labs/tests/findings requiring intervention: Acute cholelithiasis    Family present during ED course? Yes   Family Comments/Social Situation comments: Father drove him to ED.    Tasks needing completion: None    Darlene Russell RN

## 2022-08-20 ENCOUNTER — ANESTHESIA (OUTPATIENT)
Dept: SURGERY | Facility: CLINIC | Age: 42
End: 2022-08-20
Payer: COMMERCIAL

## 2022-08-20 ENCOUNTER — ANESTHESIA EVENT (OUTPATIENT)
Dept: SURGERY | Facility: CLINIC | Age: 42
End: 2022-08-20
Payer: COMMERCIAL

## 2022-08-20 VITALS
DIASTOLIC BLOOD PRESSURE: 70 MMHG | OXYGEN SATURATION: 91 % | HEIGHT: 72 IN | BODY MASS INDEX: 26.41 KG/M2 | HEART RATE: 91 BPM | RESPIRATION RATE: 15 BRPM | SYSTOLIC BLOOD PRESSURE: 114 MMHG | WEIGHT: 195 LBS | TEMPERATURE: 98.7 F

## 2022-08-20 LAB
ALBUMIN SERPL-MCNC: 3.2 G/DL (ref 3.4–5)
ALP SERPL-CCNC: 72 U/L (ref 40–150)
ALT SERPL W P-5'-P-CCNC: 19 U/L (ref 0–70)
ANION GAP SERPL CALCULATED.3IONS-SCNC: 5 MMOL/L (ref 3–14)
AST SERPL W P-5'-P-CCNC: 14 U/L (ref 0–45)
BASOPHILS # BLD AUTO: 0.1 10E3/UL (ref 0–0.2)
BASOPHILS NFR BLD AUTO: 1 %
BILIRUB SERPL-MCNC: 0.5 MG/DL (ref 0.2–1.3)
BUN SERPL-MCNC: 6 MG/DL (ref 7–30)
CALCIUM SERPL-MCNC: 8.4 MG/DL (ref 8.5–10.1)
CHLORIDE BLD-SCNC: 110 MMOL/L (ref 94–109)
CO2 SERPL-SCNC: 28 MMOL/L (ref 20–32)
CREAT SERPL-MCNC: 0.74 MG/DL (ref 0.66–1.25)
EOSINOPHIL # BLD AUTO: 0.1 10E3/UL (ref 0–0.7)
EOSINOPHIL NFR BLD AUTO: 1 %
ERYTHROCYTE [DISTWIDTH] IN BLOOD BY AUTOMATED COUNT: 12.5 % (ref 10–15)
GFR SERPL CREATININE-BSD FRML MDRD: >90 ML/MIN/1.73M2
GLUCOSE BLD-MCNC: 101 MG/DL (ref 70–99)
HCT VFR BLD AUTO: 39.6 % (ref 40–53)
HGB BLD-MCNC: 13.6 G/DL (ref 13.3–17.7)
IMM GRANULOCYTES # BLD: 0 10E3/UL
IMM GRANULOCYTES NFR BLD: 0 %
LYMPHOCYTES # BLD AUTO: 2.1 10E3/UL (ref 0.8–5.3)
LYMPHOCYTES NFR BLD AUTO: 20 %
MCH RBC QN AUTO: 31.7 PG (ref 26.5–33)
MCHC RBC AUTO-ENTMCNC: 34.3 G/DL (ref 31.5–36.5)
MCV RBC AUTO: 92 FL (ref 78–100)
MONOCYTES # BLD AUTO: 0.7 10E3/UL (ref 0–1.3)
MONOCYTES NFR BLD AUTO: 7 %
NEUTROPHILS # BLD AUTO: 7.5 10E3/UL (ref 1.6–8.3)
NEUTROPHILS NFR BLD AUTO: 71 %
NRBC # BLD AUTO: 0 10E3/UL
NRBC BLD AUTO-RTO: 0 /100
PLATELET # BLD AUTO: 258 10E3/UL (ref 150–450)
POTASSIUM BLD-SCNC: 3.3 MMOL/L (ref 3.4–5.3)
PROT SERPL-MCNC: 6.2 G/DL (ref 6.8–8.8)
RBC # BLD AUTO: 4.29 10E6/UL (ref 4.4–5.9)
SODIUM SERPL-SCNC: 143 MMOL/L (ref 133–144)
WBC # BLD AUTO: 10.4 10E3/UL (ref 4–11)

## 2022-08-20 PROCEDURE — 99204 OFFICE O/P NEW MOD 45 MIN: CPT | Mod: 57 | Performed by: SURGERY

## 2022-08-20 PROCEDURE — 272N000001 HC OR GENERAL SUPPLY STERILE: Performed by: SURGERY

## 2022-08-20 PROCEDURE — 250N000009 HC RX 250: Performed by: NURSE ANESTHETIST, CERTIFIED REGISTERED

## 2022-08-20 PROCEDURE — 250N000011 HC RX IP 250 OP 636: Performed by: SURGERY

## 2022-08-20 PROCEDURE — G0378 HOSPITAL OBSERVATION PER HR: HCPCS

## 2022-08-20 PROCEDURE — 370N000017 HC ANESTHESIA TECHNICAL FEE, PER MIN: Performed by: SURGERY

## 2022-08-20 PROCEDURE — 250N000011 HC RX IP 250 OP 636: Performed by: EMERGENCY MEDICINE

## 2022-08-20 PROCEDURE — 250N000009 HC RX 250: Performed by: EMERGENCY MEDICINE

## 2022-08-20 PROCEDURE — 88304 TISSUE EXAM BY PATHOLOGIST: CPT | Mod: TC | Performed by: SURGERY

## 2022-08-20 PROCEDURE — 47562 LAPAROSCOPIC CHOLECYSTECTOMY: CPT | Performed by: SURGERY

## 2022-08-20 PROCEDURE — 250N000013 HC RX MED GY IP 250 OP 250 PS 637: Performed by: SURGERY

## 2022-08-20 PROCEDURE — 250N000013 HC RX MED GY IP 250 OP 250 PS 637: Performed by: NURSE ANESTHETIST, CERTIFIED REGISTERED

## 2022-08-20 PROCEDURE — 271N000001 HC OR GENERAL SUPPLY NON-STERILE: Performed by: SURGERY

## 2022-08-20 PROCEDURE — 96372 THER/PROPH/DIAG INJ SC/IM: CPT | Performed by: SURGERY

## 2022-08-20 PROCEDURE — 80053 COMPREHEN METABOLIC PANEL: CPT | Performed by: EMERGENCY MEDICINE

## 2022-08-20 PROCEDURE — 96366 THER/PROPH/DIAG IV INF ADDON: CPT | Mod: XU

## 2022-08-20 PROCEDURE — 250N000009 HC RX 250: Performed by: SURGERY

## 2022-08-20 PROCEDURE — 85025 COMPLETE CBC W/AUTO DIFF WBC: CPT | Performed by: EMERGENCY MEDICINE

## 2022-08-20 PROCEDURE — 250N000011 HC RX IP 250 OP 636: Performed by: NURSE ANESTHETIST, CERTIFIED REGISTERED

## 2022-08-20 PROCEDURE — 360N000076 HC SURGERY LEVEL 3, PER MIN: Performed by: SURGERY

## 2022-08-20 PROCEDURE — 258N000003 HC RX IP 258 OP 636: Performed by: NURSE ANESTHETIST, CERTIFIED REGISTERED

## 2022-08-20 PROCEDURE — 710N000009 HC RECOVERY PHASE 1, LEVEL 1, PER MIN: Performed by: SURGERY

## 2022-08-20 PROCEDURE — 250N000011 HC RX IP 250 OP 636: Performed by: INTERNAL MEDICINE

## 2022-08-20 PROCEDURE — 96376 TX/PRO/DX INJ SAME DRUG ADON: CPT

## 2022-08-20 PROCEDURE — 250N000025 HC SEVOFLURANE, PER MIN: Performed by: SURGERY

## 2022-08-20 PROCEDURE — 36415 COLL VENOUS BLD VENIPUNCTURE: CPT | Performed by: EMERGENCY MEDICINE

## 2022-08-20 PROCEDURE — 88304 TISSUE EXAM BY PATHOLOGIST: CPT | Mod: 26 | Performed by: PATHOLOGY

## 2022-08-20 PROCEDURE — 96361 HYDRATE IV INFUSION ADD-ON: CPT

## 2022-08-20 RX ORDER — POTASSIUM CHLORIDE 7.45 MG/ML
10 INJECTION INTRAVENOUS ONCE
Status: DISCONTINUED | OUTPATIENT
Start: 2022-08-20 | End: 2022-08-20

## 2022-08-20 RX ORDER — HYDROXYZINE HYDROCHLORIDE 25 MG/1
25 TABLET, FILM COATED ORAL EVERY 6 HOURS PRN
Status: DISCONTINUED | OUTPATIENT
Start: 2022-08-20 | End: 2022-08-20 | Stop reason: HOSPADM

## 2022-08-20 RX ORDER — KETOROLAC TROMETHAMINE 30 MG/ML
INJECTION, SOLUTION INTRAMUSCULAR; INTRAVENOUS PRN
Status: DISCONTINUED | OUTPATIENT
Start: 2022-08-20 | End: 2022-08-20

## 2022-08-20 RX ORDER — CEFAZOLIN SODIUM 2 G/100ML
2 INJECTION, SOLUTION INTRAVENOUS SEE ADMIN INSTRUCTIONS
Status: DISCONTINUED | OUTPATIENT
Start: 2022-08-20 | End: 2022-08-20 | Stop reason: HOSPADM

## 2022-08-20 RX ORDER — IBUPROFEN 600 MG/1
600 TABLET, FILM COATED ORAL EVERY 6 HOURS PRN
Status: DISCONTINUED | OUTPATIENT
Start: 2022-08-20 | End: 2022-08-20 | Stop reason: HOSPADM

## 2022-08-20 RX ORDER — OXYCODONE HYDROCHLORIDE 5 MG/1
5 TABLET ORAL EVERY 6 HOURS PRN
Status: DISCONTINUED | OUTPATIENT
Start: 2022-08-20 | End: 2022-08-20 | Stop reason: HOSPADM

## 2022-08-20 RX ORDER — POTASSIUM CHLORIDE 7.45 MG/ML
10 INJECTION INTRAVENOUS
Status: DISPENSED | OUTPATIENT
Start: 2022-08-20 | End: 2022-08-20

## 2022-08-20 RX ORDER — ONDANSETRON 2 MG/ML
INJECTION INTRAMUSCULAR; INTRAVENOUS PRN
Status: DISCONTINUED | OUTPATIENT
Start: 2022-08-20 | End: 2022-08-20

## 2022-08-20 RX ORDER — BUPIVACAINE HYDROCHLORIDE AND EPINEPHRINE 2.5; 5 MG/ML; UG/ML
INJECTION, SOLUTION INFILTRATION; PERINEURAL PRN
Status: DISCONTINUED | OUTPATIENT
Start: 2022-08-20 | End: 2022-08-20 | Stop reason: HOSPADM

## 2022-08-20 RX ORDER — PROCHLORPERAZINE MALEATE 5 MG
10 TABLET ORAL EVERY 6 HOURS PRN
Status: DISCONTINUED | OUTPATIENT
Start: 2022-08-20 | End: 2022-08-20 | Stop reason: HOSPADM

## 2022-08-20 RX ORDER — HYDROMORPHONE HCL IN WATER/PF 6 MG/30 ML
0.4 PATIENT CONTROLLED ANALGESIA SYRINGE INTRAVENOUS
Status: DISCONTINUED | OUTPATIENT
Start: 2022-08-20 | End: 2022-08-20 | Stop reason: HOSPADM

## 2022-08-20 RX ORDER — HYDROMORPHONE HCL IN WATER/PF 6 MG/30 ML
0.4 PATIENT CONTROLLED ANALGESIA SYRINGE INTRAVENOUS EVERY 5 MIN PRN
Status: DISCONTINUED | OUTPATIENT
Start: 2022-08-20 | End: 2022-08-20 | Stop reason: HOSPADM

## 2022-08-20 RX ORDER — ONDANSETRON 2 MG/ML
4 INJECTION INTRAMUSCULAR; INTRAVENOUS EVERY 30 MIN PRN
Status: DISCONTINUED | OUTPATIENT
Start: 2022-08-20 | End: 2022-08-20 | Stop reason: HOSPADM

## 2022-08-20 RX ORDER — FAMOTIDINE 20 MG/1
20 TABLET, FILM COATED ORAL 2 TIMES DAILY
Status: DISCONTINUED | OUTPATIENT
Start: 2022-08-20 | End: 2022-08-20 | Stop reason: HOSPADM

## 2022-08-20 RX ORDER — FENTANYL CITRATE 50 UG/ML
50 INJECTION, SOLUTION INTRAMUSCULAR; INTRAVENOUS EVERY 5 MIN PRN
Status: DISCONTINUED | OUTPATIENT
Start: 2022-08-20 | End: 2022-08-20 | Stop reason: HOSPADM

## 2022-08-20 RX ORDER — HEPARIN SODIUM 5000 [USP'U]/.5ML
5000 INJECTION, SOLUTION INTRAVENOUS; SUBCUTANEOUS
Status: COMPLETED | OUTPATIENT
Start: 2022-08-20 | End: 2022-08-20

## 2022-08-20 RX ORDER — CEFAZOLIN SODIUM/WATER 2 G/20 ML
2 SYRINGE (ML) INTRAVENOUS
Status: DISCONTINUED | OUTPATIENT
Start: 2022-08-20 | End: 2022-08-20 | Stop reason: HOSPADM

## 2022-08-20 RX ORDER — ONDANSETRON 4 MG/1
4 TABLET, ORALLY DISINTEGRATING ORAL EVERY 6 HOURS PRN
Status: DISCONTINUED | OUTPATIENT
Start: 2022-08-20 | End: 2022-08-20

## 2022-08-20 RX ORDER — ONDANSETRON 4 MG/1
4-8 TABLET, ORALLY DISINTEGRATING ORAL EVERY 8 HOURS PRN
Qty: 10 TABLET | Refills: 0 | Status: SHIPPED | OUTPATIENT
Start: 2022-08-20 | End: 2022-12-15

## 2022-08-20 RX ORDER — OXYCODONE HYDROCHLORIDE 5 MG/1
5 TABLET ORAL EVERY 4 HOURS PRN
Status: DISCONTINUED | OUTPATIENT
Start: 2022-08-20 | End: 2022-08-20 | Stop reason: HOSPADM

## 2022-08-20 RX ORDER — PROPOFOL 10 MG/ML
INJECTION, EMULSION INTRAVENOUS PRN
Status: DISCONTINUED | OUTPATIENT
Start: 2022-08-20 | End: 2022-08-20

## 2022-08-20 RX ORDER — SODIUM CHLORIDE, SODIUM LACTATE, POTASSIUM CHLORIDE, CALCIUM CHLORIDE 600; 310; 30; 20 MG/100ML; MG/100ML; MG/100ML; MG/100ML
INJECTION, SOLUTION INTRAVENOUS CONTINUOUS PRN
Status: DISCONTINUED | OUTPATIENT
Start: 2022-08-20 | End: 2022-08-20

## 2022-08-20 RX ORDER — HALOPERIDOL 5 MG/ML
1 INJECTION INTRAMUSCULAR
Status: DISCONTINUED | OUTPATIENT
Start: 2022-08-20 | End: 2022-08-20 | Stop reason: HOSPADM

## 2022-08-20 RX ORDER — CEFAZOLIN SODIUM 2 G/100ML
2 INJECTION, SOLUTION INTRAVENOUS
Status: DISCONTINUED | OUTPATIENT
Start: 2022-08-20 | End: 2022-08-20 | Stop reason: CLARIF

## 2022-08-20 RX ORDER — KETAMINE HYDROCHLORIDE 10 MG/ML
INJECTION INTRAMUSCULAR; INTRAVENOUS PRN
Status: DISCONTINUED | OUTPATIENT
Start: 2022-08-20 | End: 2022-08-20

## 2022-08-20 RX ORDER — SODIUM CHLORIDE, SODIUM LACTATE, POTASSIUM CHLORIDE, CALCIUM CHLORIDE 600; 310; 30; 20 MG/100ML; MG/100ML; MG/100ML; MG/100ML
INJECTION, SOLUTION INTRAVENOUS CONTINUOUS
Status: DISCONTINUED | OUTPATIENT
Start: 2022-08-20 | End: 2022-08-20 | Stop reason: HOSPADM

## 2022-08-20 RX ORDER — HYDROMORPHONE HCL IN WATER/PF 6 MG/30 ML
0.2 PATIENT CONTROLLED ANALGESIA SYRINGE INTRAVENOUS
Status: DISCONTINUED | OUTPATIENT
Start: 2022-08-20 | End: 2022-08-20 | Stop reason: HOSPADM

## 2022-08-20 RX ORDER — FENTANYL CITRATE 50 UG/ML
INJECTION, SOLUTION INTRAMUSCULAR; INTRAVENOUS PRN
Status: DISCONTINUED | OUTPATIENT
Start: 2022-08-20 | End: 2022-08-20

## 2022-08-20 RX ORDER — ONDANSETRON 2 MG/ML
4 INJECTION INTRAMUSCULAR; INTRAVENOUS EVERY 6 HOURS PRN
Status: DISCONTINUED | OUTPATIENT
Start: 2022-08-20 | End: 2022-08-20

## 2022-08-20 RX ORDER — LIDOCAINE 40 MG/G
CREAM TOPICAL
Status: DISCONTINUED | OUTPATIENT
Start: 2022-08-20 | End: 2022-08-20 | Stop reason: HOSPADM

## 2022-08-20 RX ORDER — OXYCODONE HYDROCHLORIDE 5 MG/1
5 TABLET ORAL EVERY 6 HOURS PRN
Qty: 12 TABLET | Refills: 0 | Status: SHIPPED | OUTPATIENT
Start: 2022-08-20 | End: 2022-08-23

## 2022-08-20 RX ORDER — ONDANSETRON 4 MG/1
4 TABLET, ORALLY DISINTEGRATING ORAL EVERY 30 MIN PRN
Status: DISCONTINUED | OUTPATIENT
Start: 2022-08-20 | End: 2022-08-20 | Stop reason: HOSPADM

## 2022-08-20 RX ADMIN — FENTANYL CITRATE 100 MCG: 50 INJECTION, SOLUTION INTRAMUSCULAR; INTRAVENOUS at 08:45

## 2022-08-20 RX ADMIN — POTASSIUM CHLORIDE 10 MEQ: 7.46 INJECTION, SOLUTION INTRAVENOUS at 08:19

## 2022-08-20 RX ADMIN — OXYCODONE HYDROCHLORIDE 5 MG: 5 TABLET ORAL at 10:57

## 2022-08-20 RX ADMIN — ROCURONIUM BROMIDE 50 MG: 50 INJECTION, SOLUTION INTRAVENOUS at 08:45

## 2022-08-20 RX ADMIN — CEFOTETAN DISODIUM 2 G: 2 INJECTION, POWDER, FOR SOLUTION INTRAMUSCULAR; INTRAVENOUS at 06:12

## 2022-08-20 RX ADMIN — KETOROLAC TROMETHAMINE 30 MG: 30 INJECTION, SOLUTION INTRAMUSCULAR at 10:17

## 2022-08-20 RX ADMIN — SUGAMMADEX 200 MG: 100 INJECTION, SOLUTION INTRAVENOUS at 10:08

## 2022-08-20 RX ADMIN — SODIUM CHLORIDE, POTASSIUM CHLORIDE, SODIUM LACTATE AND CALCIUM CHLORIDE: 600; 310; 30; 20 INJECTION, SOLUTION INTRAVENOUS at 10:04

## 2022-08-20 RX ADMIN — ROCURONIUM BROMIDE 10 MG: 50 INJECTION, SOLUTION INTRAVENOUS at 09:49

## 2022-08-20 RX ADMIN — KETAMINE HYDROCHLORIDE 50 MG: 10 INJECTION, SOLUTION INTRAMUSCULAR; INTRAVENOUS at 08:45

## 2022-08-20 RX ADMIN — IBUPROFEN 600 MG: 600 TABLET ORAL at 13:10

## 2022-08-20 RX ADMIN — POTASSIUM CHLORIDE 10 MEQ: 7.46 INJECTION, SOLUTION INTRAVENOUS at 06:46

## 2022-08-20 RX ADMIN — HYDROMORPHONE HYDROCHLORIDE 0.5 MG: 1 INJECTION, SOLUTION INTRAMUSCULAR; INTRAVENOUS; SUBCUTANEOUS at 03:48

## 2022-08-20 RX ADMIN — ONDANSETRON 4 MG: 2 INJECTION INTRAMUSCULAR; INTRAVENOUS at 10:48

## 2022-08-20 RX ADMIN — HEPARIN SODIUM 5000 UNITS: 10000 INJECTION, SOLUTION INTRAVENOUS; SUBCUTANEOUS at 08:19

## 2022-08-20 RX ADMIN — MIDAZOLAM 2 MG: 1 INJECTION INTRAMUSCULAR; INTRAVENOUS at 08:40

## 2022-08-20 RX ADMIN — PROPOFOL 150 MG: 10 INJECTION, EMULSION INTRAVENOUS at 08:45

## 2022-08-20 RX ADMIN — CEFAZOLIN SODIUM 2 G: 2 INJECTION, SOLUTION INTRAVENOUS at 08:37

## 2022-08-20 RX ADMIN — ONDANSETRON 4 MG: 2 INJECTION INTRAMUSCULAR; INTRAVENOUS at 08:45

## 2022-08-20 RX ADMIN — PROPOFOL 50 MG: 10 INJECTION, EMULSION INTRAVENOUS at 10:01

## 2022-08-20 RX ADMIN — SODIUM CHLORIDE, POTASSIUM CHLORIDE, SODIUM LACTATE AND CALCIUM CHLORIDE: 600; 310; 30; 20 INJECTION, SOLUTION INTRAVENOUS at 08:40

## 2022-08-20 RX ADMIN — HYDROXYZINE HYDROCHLORIDE 25 MG: 25 TABLET ORAL at 10:45

## 2022-08-20 RX ADMIN — HYDROMORPHONE HYDROCHLORIDE 1 MG: 1 INJECTION, SOLUTION INTRAMUSCULAR; INTRAVENOUS; SUBCUTANEOUS at 09:12

## 2022-08-20 RX ADMIN — HYDROMORPHONE HYDROCHLORIDE 0.4 MG: 0.2 INJECTION, SOLUTION INTRAMUSCULAR; INTRAVENOUS; SUBCUTANEOUS at 10:40

## 2022-08-20 ASSESSMENT — ACTIVITIES OF DAILY LIVING (ADL)
ADLS_ACUITY_SCORE: 33

## 2022-08-20 ASSESSMENT — LIFESTYLE VARIABLES: TOBACCO_USE: 1

## 2022-08-20 NOTE — PLAN OF CARE
PRIMARY DIAGNOSIS: ACUTE PAIN  OUTPATIENT/OBSERVATION GOALS TO BE MET BEFORE DISCHARGE:  1. Pain Status: Improved but still requiring IV narcotics.    2. Return to near baseline physical activity: Yes    3. Cleared for discharge by consultants (if involved): No    Discharge Planner Nurse   Safe discharge environment identified: Yes  Barriers to discharge:  Surgery at 0830 on 8/20.       Entered by: Billie Ty RN 08/20/2022 2:05 AM   Patient pain nausea under better control.  Patient has been able to sleep in between cares.  Please review provider order for any additional goals.   Nurse to notify provider when observation goals have been met and patient is ready for discharge.      Pre-op checklist started.  Potassium low at 2.9 this AM, provider updated - he ordered potassium replacement.

## 2022-08-20 NOTE — PROGRESS NOTES
Patient to surgery at this time. Ancef to be given in pre op area. Updates given to RON Tripathi. Dr Ritter signed consent with patient and wife.

## 2022-08-20 NOTE — ANESTHESIA CARE TRANSFER NOTE
Patient: Raul Abdul    Procedure: Procedure(s):  CHOLECYSTECTOMY, LAPAROSCOPIC, possible open       Diagnosis: Acute cholecystitis [K81.0]  Diagnosis Additional Information: No value filed.    Anesthesia Type:   General     Note:    Oropharynx: oropharynx clear of all foreign objects and spontaneously breathing  Level of Consciousness: awake  Oxygen Supplementation: face mask  Level of Supplemental Oxygen (L/min / FiO2): 10  Independent Airway: airway patency satisfactory and stable  Dentition: dentition unchanged  Vital Signs Stable: post-procedure vital signs reviewed and stable  Report to RN Given: handoff report given  Patient transferred to: PACU    Handoff Report: Identifed the Patient, Identified the Reponsible Provider, Reviewed the pertinent medical history, Discussed the surgical course, Reviewed Intra-OP anesthesia mangement and issues during anesthesia, Set expectations for post-procedure period and Allowed opportunity for questions and acknowledgement of understanding      Vitals:  Vitals Value Taken Time   BP     Temp     Pulse     Resp     SpO2         Electronically Signed By: GIULIANO De La Rosa CRNA  August 20, 2022  10:27 AM

## 2022-08-20 NOTE — H&P
Name:  Raul Abdul  Date/Time of Admission: 2022  1:28 PM   CSN: [id number]  Attending Provider: Rosie Ritter MD   Room/Bed:  [facility]/2304-  : 1980  42 year old        Subjective:     Procedure:  Laparoscopic cholecystectomy, possible open     HPI:  Raul Abdul is a 42 year old male who presents with epigastric pain; this started after eating 2 oatmeal raisin cookies.  Never had this before.  Pain started at around noon and continue to persist.  Patient came to the ED.  Work-up showed acute cholecystitis.  Patient admitted to extreme nausea.  Noted dry heaving but no actual vomiting.  Pain radiates into his back.  Is very sharp.  Uncontrolled despite pain medications.  No issues prior to this.  Stated that all of his family members have had their gallbladder out.  Denies any past medical history.  Or had a heart attack.  Never had a stroke.  Currently not on any chronic medications.  History of epigastric hernia repair with mesh when he was 21 years old.  Never had abdominal surgery; Besides the hernia repair.  Denies any headache or visual changes.  Denies any chest pain or shortness of breath.  He is not having significant flank pain.  Denies any bowel or bladder dysfunction.  Has not had any leg swelling calf pain.  Denies any rash.    ED work-up showed acute cholecystitis with multiple gallstones.  However no leukocytosis.  CMP did not show elevated AST ALT or alk phos.    Primary Care Physician:  Juan Gonzales     Allergies:  No Known Allergies     Outpatient Meds:  Medications Prior to Admission   Medication Sig Dispense Refill Last Dose     acetaminophen (TYLENOL) 325 MG tablet Take 650 mg by mouth every 6 hours   Past Month at Unknown time     ibuprofen (ADVIL/MOTRIN) 200 MG tablet Take 600 mg by mouth every 6 hours as needed for mild pain   Past Month at Unknown time     omeprazole (PRILOSEC) 10 MG DR capsule Take 20 mg by mouth daily   2022 at Unknown time     sildenafil (VIAGRA)  50 MG tablet TAKE 1 TABLET BY MOUTH ONCE DAILY AS NEEDED 50 tablet 0 Unknown at Unknown time       Past Medical History:  Past Medical History:   Diagnosis Date     Tobacco abuse         Past Surgical History:  Past Surgical History:   Procedure Laterality Date     AS REPAIR EPIGASTRIC HERNIA,REDUC          Social History:  Social History     Socioeconomic History     Marital status:      Spouse name: Not on file     Number of children: 2     Years of education: Not on file     Highest education level: Not on file   Occupational History     Occupation:    Tobacco Use     Smoking status: Current Every Day Smoker     Packs/day: 1.00     Years: 20.00     Pack years: 20.00     Types: Cigarettes     Smokeless tobacco: Never Used   Substance and Sexual Activity     Alcohol use: Yes     Alcohol/week: 0.0 standard drinks     Comment: Socially, rare. 6 beers in a month     Drug use: No     Sexual activity: Yes     Partners: Female   Other Topics Concern     Parent/sibling w/ CABG, MI or angioplasty before 65F 55M? Not Asked   Social History Narrative     Not on file     Social Determinants of Health     Financial Resource Strain: Not on file   Food Insecurity: Not on file   Transportation Needs: Not on file   Physical Activity: Not on file   Stress: Not on file   Social Connections: Not on file   Intimate Partner Violence: Not on file   Housing Stability: Not on file       Family History:  Family History   Problem Relation Age of Onset     Diabetes Mother      Hypertension Mother      Atrial fibrillation Mother      Diabetes Father      Hypertension Father      Prostate Cancer Father      Heart Disease Father      Cancer Brother 40        Skin cancer, unsure which type            Review of Systems:     Constitutional: Denies fever or chills   Eyes: Denies change in visual acuity   HENT: Denies nasal congestion or sore throat   Respiratory: Denies cough or shortness of breath   Cardiovascular:  Denies chest pain or edema   GI: Denies vomiting, bloody stools or diarrhea; +abdominal pain, nausea,   : Denies dysuria   Musculoskeletal: Denies back pain or joint pain   Integument: Denies rash   Neurologic: Denies headache, focal weakness or sensory changes   Endocrine: Denies polyuria or polydipsia   Lymphatic: Denies swollen glands   Psychiatric: Denies depression or anxiety     Objective:     Vital Signs:  /70 (BP Location: Right arm)   Pulse 75   Temp 98.6  F (37  C)   Resp 16   Ht 1.829 m (6')   Wt 88.5 kg (195 lb)   SpO2 98%   BMI 26.45 kg/m      Physical Exam:   Physical Exam  Vitals reviewed.   HENT:      Head: Normocephalic.   Eyes:      Conjunctiva/sclera: Conjunctivae normal.   Cardiovascular:      Rate and Rhythm: Normal rate.      Pulses: Normal pulses.   Pulmonary:      Effort: Pulmonary effort is normal.   Abdominal:      General: There is no distension.      Palpations: Abdomen is soft.      Tenderness: There is abdominal tenderness. There is guarding.   Musculoskeletal:         General: Normal range of motion.      Cervical back: Normal range of motion.   Skin:     General: Skin is warm.      Capillary Refill: Capillary refill takes less than 2 seconds.   Neurological:      General: No focal deficit present.      Mental Status: He is alert.   Psychiatric:         Mood and Affect: Mood normal.           Pre-operative labs and imaging, if any, were reviewed.      Assessment:     Acute calculus cholecystitis  hypokalemia      Plan:       The patient was informed that the proposed procedure or medical intervention involves removal of the gallbladder laparoscopically (with small incisions and camera) possibly open and does offer a very good likelihood of symptom relief.     The patient was made aware of the risks of the procedure, including but not limited to:    bleeding, conversion to open, bile leak, bile duct injury or other adjacent organ injury, retained gallstones, cardiac or  pulmonary related complications and anesthesia complications. Also that difficulties may be encountered during recovery to include: wound or deep intraabdominal infection, wound dehiscence, incisional hernia, bile leak or retained stone requiring ERCP.     In the course of the evaluation we did discuss other therapeutic options with the patient, including antibiotics and/or drainage. The risks and benefits of these options were also discussed which include but are not limited to: recurrent worsening episodes.     Also discussed were possible problems or difficulties the patient may encounter if treatment was not pursued at this time. These include: worsening episodes, cholangitis and/or pancreatitis.     The patient was informed that Rosie Ritter DO will be primarily responsible for the procedure. Assistance during the procedure and during hospitalization may also be provided by other physicians, nurses and technicians.     The patient was also informed that if exposure to the patient s blood or body fluids occurs during the procedure, HIV testing of the patient will occur unless they refuse at this time. Risk of blood transfusion is minimal.     The patient will be provided additional education resources by the support staff. If there are ever any questions regarding their diagnosis or the procedure, the patient is encouraged to ask.     All of the patient s or their legal representative s questions have been answered to their satisfaction and they have indicated a clear understanding of this discussion.   Raul expressed understanding of risks, benefits and alternatives and wished to proceed.     All findings, test results, and diagnosis were discussed with the patient. Raul  participated in the decision making process and agreed with the plan of care. Questions were sought and answered.         Electronically signed by:  Rosie Ritter MD  8/20/2022   8:19 AM   Disclaimer: This note consists of words and symbols  derived from keyboarding and dictation using voice recognition software.  As a result, there may be errors that have gone undetected.  Please consider this when interpreting information found in this note.

## 2022-08-20 NOTE — OP NOTE
OPERATIVE NOTE  [facility] SURGERY    DATE:  8/20/2022    SURGEON:  Rosie Ritter DO    ASSISTANT:  Single scrub tech    PREOPERATIVE DIAGNOSIS:  Acute cholecystitis [K81.0]  BMI 26  hypokalemia      POSTOPERATIVE DIAGNOSIS:  same    OPERATION:  Laparoscopic cholecystectomy.    ANESTHESIA:  General endotracheal.    INDICATIONS FOR PROCEDURE:  The patient is a 42 year old year old male with acute cholecystitis.  Based on this, laparoscopic cholecystectomy was recommended.    FINDINGS:  Acute cholecystitis, thickened gallbladder wall, friable tissue, at least 2 large stones with many smaller ones.  Attempted to aspirate the gallbladder however, it was unsuccessful but this have bile decompressed from gallbladder during the process.     PROCEDURE IN DETAIL: The patient was preoperatively identified. Consent was signed and placed on the chart. She/he was brought back to the operative suite where he was laid supine on the operating table. General endotracheal anesthesia was induced per anesthesia protocol. She/he was then prepped and draped in sterile fashion. We started by infiltrating 5 cc of local anesthetic in the right upper quadrant area and made small skin incision and accessing the abdominal cavity by using Optiview trocar and 5-mm trocar site visualizing all layers of the abdominal wall until we reached the peritoneal cavity. We then insufflated  with CO2 gas to create pneumoperitoneum. A second right subcostal port was inserted, also 5 mm, as well as subxiphoid at supraumbilical site. All were 5 mm ports except the subxiphoid. All were inserted under direct visualization and all sites were preanesthetized with local anesthetic prior to the insertion of the trocar. We then grabbed the fundus of the gallbladder, retracted it anteriorly and cephalad. This was successful after large of amount of bile drained from the attempted aspiration.  There were large number of adhesions to the greater omentum and the  gallbladder. These were bluntly taken down. After we had successfully taken down these adhesions, we then grabbed the neck of the gallbladder, retracted it laterally, and undertook dissection of the Calot's triangle. We identified the duct and artery, skeletonized the structures, clipped them proximally and distally, and ligated them with EndoShears.  This done after the critical view was achieved.   The tissue was very friable and was oozing throughout the case.  The gallbladder was then dissected from the liver bed using electrocautery. The gallbladder was retrieved through the epigastic port, and the trochar replaced.  The epigastric incision did get elongated to accommodate the size of the gallbladder and stones.   Thus, suction irrigation was used to irrigate RUQ area due to bile spillage from the aspiration.  Once pneumoperitoneum was reestablished, the gallbladder bed was inspected for bleeding and bile leak and neither were found. The patient was positioned flat, irrigant solution was suctioned free, and pneumoperitoneum was relieved before removing the trocars.     Hemostasis was noted to be excellent at the conclusion of the case. We closed the 12mm trocar transfascially with a 0-vicryl on a UR6.All irrigant that could be visualized was removed from the abdomen. We then desufflated the abdomen, reduced pneumoperitoneum, and removed the trocars under direct visualization. We then undertook skin closure with interrupted Monocryl sutures in subcuticular fashion. The patient tolerated the procedure well and was brought to PACU in stable condition.        COMPLICATIONS: None.    ESTIMATED BLOOD LOSS: 5cc    SPECIMENS: Gallbladder.    DISPOSITION: Stable to PACU with anticipated discharge home later today if pain controlled.     [facility], DO

## 2022-08-20 NOTE — PROGRESS NOTES
Patient tolerated regular lunch, denies nausea and reports pain adequately controlled.  Abdominal lap sites dry and intact, no drainage.  Voiding and ambulated in halls independently, request to discharge made and MD updated.      [facility]HEVARRIA DISCHARGE NOTE    Patient discharged to home at 1:13 PM via wheel chair. Accompanied by spouse and significant other and staff. Discharge instructions reviewed with patient, opportunity offered to ask questions. Prescriptions sent to patients preferred pharmacy. All belongings sent with patient.    Camryn Ayala RN

## 2022-08-20 NOTE — DISCHARGE SUMMARY
[facility] Discharge Summary    Raul Abdul MRN# [id number]   Age: 42 year old YOB: 1980     Date of Admission:  8/19/2022  Date of Discharge::  8/20/2022   Admitting Physician:  Rosie Ritter MD  Discharge Physician:  Rosie Ritter MD     [place]  [place]  Primary doctor:   Juan Gonzales    Admission Diagnoses:  Acute cholecystitis [K81.0]  hypokalemia    Discharge Diagnoses:  Active Problems:    Acute cholecystitis    Assessment: as above    Plan: lap alisha      Procedures:  Lap alisha     Medications prior to Admission:  Medications Prior to Admission   Medication Sig Dispense Refill Last Dose     acetaminophen (TYLENOL) 325 MG tablet Take 650 mg by mouth every 6 hours   Past Month at Unknown time     ibuprofen (ADVIL/MOTRIN) 200 MG tablet Take 600 mg by mouth every 6 hours as needed for mild pain   Past Month at Unknown time     omeprazole (PRILOSEC) 10 MG DR capsule Take 20 mg by mouth daily   8/19/2022 at Unknown time     sildenafil (VIAGRA) 50 MG tablet TAKE 1 TABLET BY MOUTH ONCE DAILY AS NEEDED 50 tablet 0 Unknown at Unknown time       Medications on Discharge:  Current Discharge Medication List      START taking these medications    Details   ondansetron (ZOFRAN ODT) 4 MG ODT tab Take 1-2 tablets (4-8 mg) by mouth every 8 hours as needed for nausea Dissolve ON the tongue.  Qty: 10 tablet, Refills: 0    Associated Diagnoses: Acute cholecystitis; S/P laparoscopic cholecystectomy      oxyCODONE (ROXICODONE) 5 MG tablet Take 1 tablet (5 mg) by mouth every 6 hours as needed for pain (Moderate to Severe)  Qty: 12 tablet, Refills: 0    Associated Diagnoses: Acute cholecystitis; S/P laparoscopic cholecystectomy         CONTINUE these medications which have NOT CHANGED    Details   acetaminophen (TYLENOL) 325 MG tablet Take 650 mg by mouth every 6 hours      ibuprofen (ADVIL/MOTRIN) 200 MG tablet Take 600 mg by mouth every 6 hours as needed for mild pain      omeprazole (PRILOSEC) 10 MG  DR capsule Take 20 mg by mouth daily      sildenafil (VIAGRA) 50 MG tablet TAKE 1 TABLET BY MOUTH ONCE DAILY AS NEEDED  Qty: 50 tablet, Refills: 0    Associated Diagnoses: Erectile dysfunction, unspecified erectile dysfunction type               Consultations:  No consultations were requested during this admission    Brief History of Illness:  43 yo M with acute cholecytitis.      Hospital Course:  He was admitted overnight as OR was busy well into the night.  Pt underwent lap alisha the next Monday and was discharged later in the afternoon as his pain controlled and tolerating diet.     Discharge Exam:    /70 (BP Location: Left arm)   Pulse 91   Temp 98.7  F (37.1  C) (Oral)   Resp 15   Ht 1.829 m (6')   Wt 88.5 kg (195 lb)   SpO2 91%   BMI 26.45 kg/m      GENERAL APPEARANCE: healthy, alert and no distress      Attestation:  I have reviewed today's vital signs, notes, medications, labs and imaging.  Amount of time performed on this discharge summary: 5 minutes.    Rosie Ritter MD

## 2022-08-20 NOTE — ED NOTES
Pain not under control despite multiple attempts with IV meds. MD aware. Zofran given again. Patient ambulated indendently to bathroom. He reports constipation. He also had large amount of emesis while in bathroom.

## 2022-08-20 NOTE — PROGRESS NOTES
PRIMARY DIAGNOSIS: ACUTE PAIN  OUTPATIENT/OBSERVATION GOALS TO BE MET BEFORE DISCHARGE:  1. Pain Status: Improved but still requiring IV narcotics.    2. Return to near baseline physical activity: Yes    3. Cleared for discharge by consultants (if involved): No    Discharge Planner Nurse   Safe discharge environment identified: Yes  Barriers to discharge: Surgery in AM       Entered by: Billie Ty RN 08/19/2022 10:53 PM     Please review provider order for any additional goals.   Nurse to notify provider when observation goals have been met and patient is ready for discharge.

## 2022-08-20 NOTE — DISCHARGE INSTRUCTIONS
[facility] Following [facility]bladder [facility]    Pain meds:   600mg ibuprofen every 6hrs prn   650mg of acetaminophen every 6hrs prn  You can alternate these meds so that you take one every 3 hrs.    Make sure you do not go over 4000mg of acetaminophen every 24hrs, especially if you are taking Norco or percocet 5/325mg.    Care of the Incision:  Remove gauze dressing (if present) after 48 hours.  Leave small strips in place (if present).  They will gradually come off.  If surgical glue was used on your incision, keep it dry for 24 hours.  Then you may shower but don t submerge under water for at least 2 weeks.  Gently pat your incision dry with a freshly laundered towel.  Do not touch your incision with bare hands or pick at scabs.  Leave your incision open to air.  Cover it only if draining, clothing rubs or irritates it.    Activity:  Gradually increase your activity.  Walk short distances several times each day and increase the distance as your strength allows.  To promote circulation, do not cross your legs while sitting.  No strenuous lifting or straining for 2-3 weeks.  Do not lift anything over 10-20 pounds until your doctor approves an increase.  Return to work will be determined by the type of work you do and should be discussed with your physician.  Do not drive or operate equipment while taking prescription pain medicines.  You may drive 1 week after surgery if you have stopped taking prescription pain medicines and can react quickly enough to make an emergency stop if necessary.    Diet:  Return to the diet you were on before surgery.  Drink plenty of water.  Avoid foods that cause constipation.    REMEMBER--most prescription pain pills cause constipation.  Walking, extra fluids, and increased fiber (fresh fruits and vegetables, etc.) are natural remedies for constipation.  You can also take mineral oil, 1-2 Tablespoons per day.  If still constipated you may try a stool softener such as  Colace or Miralax.    Call Your Physician if You Have:  Redness, increased swelling or cloudy drainage from your incision.  A temperature of more than 101 degrees F.  Worsening pain in your incision not relieved by your prescription pain pills and/or a short rest.  Jaundice (yellowing of skin or eyes)  Abdominal distention (stomach getting very large)  Swelling in your legs  Productive cough  Burning with urination  Any questions or concerns about your recovery, please call       Business hours [phone]     After hours [phone]  Nurse Advice Line (24 hours a day)    Follow-up Care:  Make an appointment 1-2 week after your surgery.  Call [phone].

## 2022-08-20 NOTE — ANESTHESIA POSTPROCEDURE EVALUATION
Patient: Raul Abdul    Procedure: Procedure(s):  CHOLECYSTECTOMY, LAPAROSCOPIC, possible open       Anesthesia Type:  General    Note:  Disposition: Inpatient   Postop Pain Control: Uneventful            Sign Out: Well controlled pain   PONV: No   Neuro/Psych: Uneventful            Sign Out: Acceptable/Baseline neuro status   Airway/Respiratory: Uneventful            Sign Out: Acceptable/Baseline resp. status   CV/Hemodynamics: Uneventful            Sign Out: Acceptable CV status; No obvious hypovolemia; No obvious fluid overload   Other NRE: NONE   DID A NON-ROUTINE EVENT OCCUR? No           Last vitals:  Vitals Value Taken Time   /73 08/20/22 1020   Temp 36.7  C (98  F) 08/20/22 1020   Pulse 94 08/20/22 1030   Resp 14 08/20/22 1020   SpO2 95 % 08/20/22 1030       Electronically Signed By: GIULIANO De La Rosa CRNA  August 20, 2022  10:32 AM

## 2022-08-20 NOTE — PROGRESS NOTES
[place] [facility] ADMISSION NOTE    Patient admitted to room 2304 at approximately 2100 via cart from emergency room. Patient was accompanied by transport tech.     Verbal SBAR report received from RON Altamirano prior to patient arrival.     Patient ambulated to bed with stand-by assist. Patient alert and oriented X 3. Pain is not well controlled.  Medication(s) being used: narcotic analgesics including dilaudid.  . Admission vital signs: Blood pressure 129/69, pulse 100, temperature 97.2  F (36.2  C), temperature source Tympanic, resp. rate 16, height 1.829 m (6'), weight 88.5 kg (195 lb), SpO2 99 %. Patient was oriented to plan of care, call light, bed controls, tv, telephone, bathroom and visiting hours.     Risk Assessment    The following safety risks were identified during admission: fall. Yellow risk band applied: YES.     Skin Initial Assessment    This writer admitted this patient and completed a full skin assessment and Lm score in the Adult PCS flowsheet. Appropriate interventions initiated as needed.     Skin check declined by patient.         Education    Patient has a Eure to Observation order: Yes  Observation education completed and documented: Yes      Billie Ty RN

## 2022-08-20 NOTE — ANESTHESIA PREPROCEDURE EVALUATION
Anesthesia Pre-Procedure Evaluation    Patient: Raul Abdul   MRN: [id number] : 1980        Procedure : Procedure(s):  CHOLECYSTECTOMY, LAPAROSCOPIC, possible open          Past Medical History:   Diagnosis Date     Tobacco abuse       Past Surgical History:   Procedure Laterality Date     AS REPAIR EPIGASTRIC HERNIA,REDUC        No Known Allergies   Social History     Tobacco Use     Smoking status: Current Every Day Smoker     Packs/day: 1.00     Years: 20.00     Pack years: 20.00     Types: Cigarettes     Smokeless tobacco: Never Used   Substance Use Topics     Alcohol use: Yes     Alcohol/week: 0.0 standard drinks     Comment: Socially, rare. 6 beers in a month      Wt Readings from Last 1 Encounters:   22 88.5 kg (195 lb)        Anesthesia Evaluation   Pt has had prior anesthetic. Type: General and MAC.    No history of anesthetic complications       ROS/MED HX  ENT/Pulmonary:     (+) tobacco use, Current use,     Neurologic:  - neg neurologic ROS     Cardiovascular:  - neg cardiovascular ROS     METS/Exercise Tolerance: >4 METS    Hematologic:  - neg hematologic  ROS     Musculoskeletal:  - neg musculoskeletal ROS     GI/Hepatic:     (+) GERD, Asymptomatic on medication,     Renal/Genitourinary:  - neg Renal ROS     Endo:  - neg endo ROS     Psychiatric/Substance Use:  - neg psychiatric ROS     Infectious Disease:  - neg infectious disease ROS     Malignancy:  - neg malignancy ROS     Other:            Physical Exam    Airway        Mallampati: II   TM distance: > 3 FB   Neck ROM: full   Mouth opening: > 3 cm    Respiratory Devices and Support         Dental  no notable dental history         Cardiovascular   cardiovascular exam normal          Pulmonary   pulmonary exam normal                OUTSIDE LABS:  CBC:   Lab Results   Component Value Date    WBC 10.4 2022    WBC 7.4 2022    HGB 13.6 2022    HGB 14.9 2022    HCT 39.6 (L) 2022    HCT 42.7 2022    PLT  258 08/20/2022     08/19/2022     BMP:   Lab Results   Component Value Date     08/20/2022     08/19/2022    POTASSIUM 3.3 (L) 08/20/2022    POTASSIUM 2.9 (L) 08/19/2022    CHLORIDE 110 (H) 08/20/2022    CHLORIDE 103 08/19/2022    CO2 28 08/20/2022    CO2 27 08/19/2022    BUN 6 (L) 08/20/2022    BUN 7 08/19/2022    CR 0.74 08/20/2022    CR 0.78 08/19/2022     (H) 08/20/2022     (H) 08/19/2022     COAGS: No results found for: PTT, INR, FIBR  POC:   Lab Results   Component Value Date     (H) 03/18/2016     HEPATIC:   Lab Results   Component Value Date    ALBUMIN 3.2 (L) 08/20/2022    PROTTOTAL 6.2 (L) 08/20/2022    ALT 19 08/20/2022    AST 14 08/20/2022    ALKPHOS 72 08/20/2022    BILITOTAL 0.5 08/20/2022     OTHER:   Lab Results   Component Value Date    CHLOE 8.4 (L) 08/20/2022    LIPASE 64 (L) 08/19/2022    TSH 2.08 02/18/2021       Anesthesia Plan    ASA Status:  2   NPO Status:  NPO Appropriate    Anesthesia Type: General.     - Airway: Native airway   Induction: Intravenous.   Maintenance: Inhalation.        Consents    Anesthesia Plan(s) and associated risks, benefits, and realistic alternatives discussed. Questions answered and patient/representative(s) expressed understanding.     - Discussed: Risks, Benefits and Alternatives for BOTH SEDATION and the PROCEDURE were discussed     - Discussed with:  Patient         Postoperative Care    Pain management: IV analgesics.   PONV prophylaxis: Ondansetron (or other 5HT-3), Dexamethasone or Solumedrol     Comments:                GIULIANO De La Rosa CRNA

## 2022-08-24 LAB
PATH REPORT.COMMENTS IMP SPEC: NORMAL
PATH REPORT.COMMENTS IMP SPEC: NORMAL
PATH REPORT.FINAL DX SPEC: NORMAL
PATH REPORT.GROSS SPEC: NORMAL
PATH REPORT.MICROSCOPIC SPEC OTHER STN: NORMAL
PATH REPORT.RELEVANT HX SPEC: NORMAL
PHOTO IMAGE: NORMAL

## 2022-09-12 NOTE — PROGRESS NOTES
SUBJECTIVE:   CC: Raul Abdul is an 42 year old male who presents for preventative health visit.   History ed.  Patient have lap alisha for acute cholecysitis 2 weeks ago.   No family history dm  Dad with mi in 50yos.   Mom with cva - 69yo.   Overall pain improving but loose stools still. No probiotics.  Water intake good. Balanced diet except limited dairy.  No urine changes or hematuria.   Normal lipids and blood pressure.   Smoking 1/2 ppd. Not interested in quitting at this point.  Work going and home ok.   Sleep overall improving  Strong family history gallbladder issues.   Hydrocele left annoying and would like removal.   Caffeine - regular dr.pepper. gerd stable omeprazole helpful prn.   No urine changes or hematuria. No std concerns.   No acute mole changes.   Patient has been advised of split billing requirements and indicates understanding: Yes  Healthy Habits:     Getting at least 3 servings of Calcium per day:  Yes    Bi-annual eye exam:  Yes    Dental care twice a year:  Yes    Sleep apnea or symptoms of sleep apnea:  Daytime drowsiness and Excessive snoring    Diet:  Other    Frequency of exercise:  4-5 days/week    Duration of exercise:  15-30 minutes    Taking medications regularly:  Yes    Medication side effects:  None    PHQ-2 Total Score: 0    Additional concerns today:  Yes        Today's PHQ-2 Score:   PHQ-2 ( 1999 [employer]) 1/28/2021   Q1: Little interest or pleasure in doing things 1   Q2: Feeling down, depressed or hopeless 1   PHQ-2 Score 2   PHQ-2 Total Score (12-17 Years)- Positive if 3 or more points; Administer PHQ-A if positive 2   Q1: Little interest or pleasure in doing things Several days   Q2: Feeling down, depressed or hopeless Several days   PHQ-2 Score 2       Abuse: Current or Past(Physical, Sexual or Emotional)- No  Do you feel safe in your environment? Yes    Have you ever done Advance Care Planning? (For example, a Health Directive, POLST, or a discussion with a medical  provider or your loved ones about your wishes): No, advance care planning information given to patient to review.  Patient declined advance care planning discussion at this time.    Social History     Tobacco Use     Smoking status: Current Every Day Smoker     Packs/day: 1.00     Years: 20.00     Pack years: 20.00     Types: Cigarettes     Smokeless tobacco: Never Used   Substance Use Topics     Alcohol use: Yes     Alcohol/week: 0.0 standard drinks     Comment: Socially, rare. 6 beers in a month     If you drink alcohol do you typically have >3 drinks per day or >7 drinks per week? No    Alcohol Use 1/28/2021   Prescreen: >3 drinks/day or >7 drinks/week? No   Prescreen: >3 drinks/day or >7 drinks/week? -       Last PSA:   PSA   Date Value Ref Range Status   02/18/2021 0.89 0 - 4 ug/L Final     Comment:     Assay Method:  Chemiluminescence using Siemens Vista analyzer       Reviewed orders with patient. Reviewed health maintenance and updated orders accordingly - Yes  Lab work is in process    Reviewed and updated as needed this visit by clinical staff                    Reviewed and updated as needed this visit by Provider                       Review of Systems   Constitutional: Negative for chills and fever.   HENT: Negative for congestion, ear pain, hearing loss and sore throat.    Eyes: Negative for pain and visual disturbance.   Respiratory: Negative for cough and shortness of breath.    Cardiovascular: Negative for chest pain, palpitations and peripheral edema.   Gastrointestinal: Positive for diarrhea and hematochezia. Negative for abdominal pain, constipation, heartburn and nausea.   Genitourinary: Negative for dysuria, frequency, genital sores, hematuria and urgency.   Musculoskeletal: Negative for arthralgias, joint swelling and myalgias.   Skin: Negative for rash.   Neurological: Negative for dizziness, weakness, headaches and paresthesias.   Psychiatric/Behavioral: Negative for mood changes. The  "patient is not nervous/anxious.          OBJECTIVE:   /69   Pulse 94   Temp 98.4  F (36.9  C) (Tympanic)   Resp 16   Ht 1.803 m (5' 11\")   Wt 91.2 kg (201 lb)   SpO2 98%   BMI 28.03 kg/m     Physical Exam  GENERAL: healthy, alert and no distress  EYES: Eyes grossly normal to inspection, PERRL and conjunctivae and sclerae normal  HENT: ear canals and TM's normal, nose and mouth without ulcers or lesions  NECK: no adenopathy, no asymmetry, masses, or scars and thyroid normal to palpation  RESP: lungs clear to auscultation - no rales, rhonchi or wheezes  BREAST: normal without masses, tenderness or nipple discharge and no palpable axillary masses or adenopathy  CV: regular rate and rhythm, normal S1 S2, no S3 or S4, no murmur, click or rub, no peripheral edema and peripheral pulses strong  ABDOMEN: soft, nontender, no hepatosplenomegaly, no masses and bowel sounds normal   (male): patient deferred /rectal exams  MS: no gross musculoskeletal defects noted, no edema  SKIN: no suspicious lesions or rashes,incisions clean  NEURO: Normal strength and tone, mentation intact and speech normal  PSYCH: mentation appears normal, affect normal/bright  LYMPH: no cervical, supraclavicular, axillary, or inguinal adenopathy    ASSESSMENT/PLAN:   ASSESSMENT / PLAN:  (Z00.00) Routine general medical examination at a health care facility  (primary encounter diagnosis)  Comment: generally healthy and normal exam  Plan: CBC with platelets, UA with Microscopic reflex         to Culture        exerise and vitaminD  Avoid pop. Reviewed self mole/testicle check handout.  Loose stools/hemorrhoid should improve with time. Probiotics bid q36zepn and limit fat intake/eat slowly. GI if worse. Expected course and warning signs reviewed. Call/email with questions/concerns     (N52.9) Erectile dysfunction, unspecified erectile dysfunction type  Comment: stable  Plan: sildenafil (VIAGRA) 50 MG tablet, Testosterone         total        " Patient would like follow-up testosterone. Better sleep might help too. Eercise.     (F17.200) Tobacco use disorder  Comment: needs help  Plan: patient will cut down and call for prescription if needed    (N43.3) Hydrocele in adult  Comment: large/annoying  Plan: Adult Urology  Referral        Patient would like removal.     (Z12.5) Screening PSA (prostate specific antigen)    Plan: Prostate Specific Antigen Screen        future    (Z13.6) CARDIOVASCULAR SCREENING; LDL GOAL LESS THAN 130  Comment: not fasting  Plan: Lipid Profile        Future. Quit smoking. Exercise/diet. Weight loss.     (R06.83) Snoring  Comment: possible marcel  Plan: Adult Sleep Eval & Management          Referral        Sleep study. Weight loss. Avoid ALCOHOL        Patient has been advised of split billing requirements and indicates understanding: Yes    COUNSELING:   Reviewed preventive health counseling, as reflected in patient instructions       Regular exercise       Healthy diet/nutrition       Vision screening       Alcohol Use        Prostate cancer screening       Osteoporosis prevention/bone health    Estimated body mass index is 26.45 kg/m  as calculated from the following:    Height as of 8/19/22: 1.829 m (6').    Weight as of 8/19/22: 88.5 kg (195 lb).     Weight management plan: Discussed healthy diet and exercise guidelines    He reports that he has been smoking cigarettes. He has a 20.00 pack-year smoking history. He has never used smokeless tobacco.  Nicotine/Tobacco Cessation Plan:   Information offered: Patient not interested at this time      Counseling Resources:  ATP IV Guidelines  Pooled Cohorts Equation Calculator  FRAX Risk Assessment  ICSI Preventive Guidelines  Dietary Guidelines for Americans, 2010  USDA's MyPlate  ASA Prophylaxis  Lung CA Screening    Juan Gonzales MD  [facility]

## 2022-09-13 ENCOUNTER — TELEPHONE (OUTPATIENT)
Dept: FAMILY MEDICINE | Facility: CLINIC | Age: 42
End: 2022-09-13

## 2022-09-13 ENCOUNTER — OFFICE VISIT (OUTPATIENT)
Dept: FAMILY MEDICINE | Facility: CLINIC | Age: 42
End: 2022-09-13
Payer: COMMERCIAL

## 2022-09-13 VITALS
WEIGHT: 201 LBS | BODY MASS INDEX: 28.14 KG/M2 | SYSTOLIC BLOOD PRESSURE: 124 MMHG | HEART RATE: 94 BPM | HEIGHT: 71 IN | TEMPERATURE: 98.4 F | RESPIRATION RATE: 16 BRPM | DIASTOLIC BLOOD PRESSURE: 69 MMHG | OXYGEN SATURATION: 98 %

## 2022-09-13 DIAGNOSIS — N52.9 ERECTILE DYSFUNCTION, UNSPECIFIED ERECTILE DYSFUNCTION TYPE: ICD-10-CM

## 2022-09-13 DIAGNOSIS — N43.3 HYDROCELE IN ADULT: ICD-10-CM

## 2022-09-13 DIAGNOSIS — Z00.00 ROUTINE GENERAL MEDICAL EXAMINATION AT A HEALTH CARE FACILITY: Primary | ICD-10-CM

## 2022-09-13 DIAGNOSIS — F17.200 TOBACCO USE DISORDER: ICD-10-CM

## 2022-09-13 DIAGNOSIS — Z12.5 SCREENING PSA (PROSTATE SPECIFIC ANTIGEN): ICD-10-CM

## 2022-09-13 DIAGNOSIS — Z13.6 CARDIOVASCULAR SCREENING; LDL GOAL LESS THAN 130: ICD-10-CM

## 2022-09-13 DIAGNOSIS — R06.83 SNORING: ICD-10-CM

## 2022-09-13 PROCEDURE — 99213 OFFICE O/P EST LOW 20 MIN: CPT | Mod: 25 | Performed by: FAMILY MEDICINE

## 2022-09-13 PROCEDURE — 99396 PREV VISIT EST AGE 40-64: CPT | Performed by: FAMILY MEDICINE

## 2022-09-13 RX ORDER — SILDENAFIL 50 MG/1
TABLET, FILM COATED ORAL
Qty: 50 TABLET | Refills: 1 | Status: SHIPPED | OUTPATIENT
Start: 2022-09-13 | End: 2023-01-13

## 2022-09-13 ASSESSMENT — PAIN SCALES - GENERAL: PAINLEVEL: NO PAIN (0)

## 2022-09-13 ASSESSMENT — ENCOUNTER SYMPTOMS
SHORTNESS OF BREATH: 0
DYSURIA: 0
FREQUENCY: 0
CHILLS: 0
HEADACHES: 0
WEAKNESS: 0
MYALGIAS: 0
NERVOUS/ANXIOUS: 0
PARESTHESIAS: 0
HEARTBURN: 0
HEMATURIA: 0
FEVER: 0
CONSTIPATION: 0
SORE THROAT: 0
PALPITATIONS: 0
HEMATOCHEZIA: 1
ARTHRALGIAS: 0
COUGH: 0
EYE PAIN: 0
ABDOMINAL PAIN: 0
JOINT SWELLING: 0
DIZZINESS: 0
DIARRHEA: 1
NAUSEA: 0

## 2022-10-12 ENCOUNTER — LAB (OUTPATIENT)
Dept: LAB | Facility: CLINIC | Age: 42
End: 2022-10-12
Payer: COMMERCIAL

## 2022-10-12 DIAGNOSIS — Z12.5 SCREENING PSA (PROSTATE SPECIFIC ANTIGEN): ICD-10-CM

## 2022-10-12 DIAGNOSIS — N52.9 ERECTILE DYSFUNCTION, UNSPECIFIED ERECTILE DYSFUNCTION TYPE: ICD-10-CM

## 2022-10-12 DIAGNOSIS — Z00.00 ROUTINE GENERAL MEDICAL EXAMINATION AT A HEALTH CARE FACILITY: ICD-10-CM

## 2022-10-12 DIAGNOSIS — Z13.6 CARDIOVASCULAR SCREENING; LDL GOAL LESS THAN 130: ICD-10-CM

## 2022-10-12 LAB
ALBUMIN UR-MCNC: NEGATIVE MG/DL
APPEARANCE UR: CLEAR
BILIRUB UR QL STRIP: NEGATIVE
CHOLEST SERPL-MCNC: 193 MG/DL
COLOR UR AUTO: YELLOW
ERYTHROCYTE [DISTWIDTH] IN BLOOD BY AUTOMATED COUNT: 12.4 % (ref 10–15)
FASTING STATUS PATIENT QL REPORTED: YES
GLUCOSE UR STRIP-MCNC: NEGATIVE MG/DL
HCT VFR BLD AUTO: 39.1 % (ref 40–53)
HDLC SERPL-MCNC: 40 MG/DL
HGB BLD-MCNC: 14 G/DL (ref 13.3–17.7)
HGB UR QL STRIP: ABNORMAL
KETONES UR STRIP-MCNC: NEGATIVE MG/DL
LDLC SERPL CALC-MCNC: 132 MG/DL
LEUKOCYTE ESTERASE UR QL STRIP: NEGATIVE
MCH RBC QN AUTO: 32.7 PG (ref 26.5–33)
MCHC RBC AUTO-ENTMCNC: 35.8 G/DL (ref 31.5–36.5)
MCV RBC AUTO: 91 FL (ref 78–100)
NITRATE UR QL: NEGATIVE
NONHDLC SERPL-MCNC: 153 MG/DL
PH UR STRIP: 6 [PH] (ref 5–7)
PLATELET # BLD AUTO: 312 10E3/UL (ref 150–450)
PSA SERPL-MCNC: 1.16 UG/L (ref 0–4)
RBC # BLD AUTO: 4.28 10E6/UL (ref 4.4–5.9)
RBC #/AREA URNS AUTO: ABNORMAL /HPF
SP GR UR STRIP: 1.01 (ref 1–1.03)
SQUAMOUS #/AREA URNS AUTO: ABNORMAL /LPF
TRIGL SERPL-MCNC: 107 MG/DL
UROBILINOGEN UR STRIP-ACNC: 0.2 E.U./DL
WBC # BLD AUTO: 7 10E3/UL (ref 4–11)
WBC #/AREA URNS AUTO: ABNORMAL /HPF

## 2022-10-12 PROCEDURE — 80061 LIPID PANEL: CPT

## 2022-10-12 PROCEDURE — 85027 COMPLETE CBC AUTOMATED: CPT

## 2022-10-12 PROCEDURE — G0103 PSA SCREENING: HCPCS

## 2022-10-12 PROCEDURE — 36415 COLL VENOUS BLD VENIPUNCTURE: CPT

## 2022-10-12 PROCEDURE — 81001 URINALYSIS AUTO W/SCOPE: CPT

## 2022-10-12 PROCEDURE — 84403 ASSAY OF TOTAL TESTOSTERONE: CPT

## 2022-10-14 LAB — TESTOST SERPL-MCNC: 666 NG/DL (ref 240–950)

## 2022-10-20 ENCOUNTER — PREP FOR PROCEDURE (OUTPATIENT)
Dept: UROLOGY | Facility: CLINIC | Age: 42
End: 2022-10-20

## 2022-10-20 ENCOUNTER — TELEPHONE (OUTPATIENT)
Dept: UROLOGY | Facility: CLINIC | Age: 42
End: 2022-10-20

## 2022-10-20 ENCOUNTER — VIRTUAL VISIT (OUTPATIENT)
Dept: UROLOGY | Facility: CLINIC | Age: 42
End: 2022-10-20
Payer: COMMERCIAL

## 2022-10-20 DIAGNOSIS — N43.3 HYDROCELE IN ADULT: ICD-10-CM

## 2022-10-20 DIAGNOSIS — N43.3 HYDROCELE IN ADULT: Primary | ICD-10-CM

## 2022-10-20 DIAGNOSIS — Z30.09 VASECTOMY EVALUATION: Primary | ICD-10-CM

## 2022-10-20 PROCEDURE — 99213 OFFICE O/P EST LOW 20 MIN: CPT | Mod: 95 | Performed by: UROLOGY

## 2022-10-20 NOTE — PATIENT INSTRUCTIONS
STOP SMOKING INSTRUCTION  Instruction is provided on the importance of smoking cessation and its impact on current and future health.    - alternatives available to help   - making commitment and decision to quit   - The nature of nicotine addiction is discussed   - behavioral therapy is discussed and suggested.    - nicotine replacement therapyis discussed.    - Chantix side effects and risk discussed   Handout given with warning about psychiatric risks- advised to report if excessive dysphoria

## 2022-10-20 NOTE — TELEPHONE ENCOUNTER
Type of surgery:  HYDROCELECTOMY, SCROTAL APPROACH (Left)   CPT 39065     Vasectomy evaluation Z30.09     Hydrocele in adult  N43.3    Location of surgery: [facility]  Date and time of surgery: 12/15/2022  Surgeon:Elvi  Pre-Op Appt Date: 12/12/2022  Post-Op Appt Date: 01/13/2022   Packet sent out: Yes  Pre-cert/Authorization completed:  No prior auth required for [employer] [place] per Availity Chat feature.   Conversation ID: [id number]    Date: 10/20/22    Hoda Cheatham  Financial Securing/Prior Auth Dept  [phone]

## 2022-10-20 NOTE — PROGRESS NOTES
Raul is a 42 year old who is being evaluated via a billable video visit.      How would you like to obtain your AVS? MyChart  If the video visit is dropped, the invitation should be resent by: Text to cell phone: [phone]  Will anyone else be joining your video visit? No      20 mins spent patient/documentation        Subjective   Raul is a 42 year old, presenting for the following health issues:  Video Visit      HPI     F/u for hydrocele/vasectomy.  He is interested in surgical treatment.      Review of Systems   Constitutional, HEENT, cardiovascular, pulmonary, gi and gu systems are negative, except as otherwise noted.      Objective           Vitals:  No vitals were obtained today due to virtual visit.    Physical Exam   GENERAL: Healthy, alert and no distress  EYES: Eyes grossly normal to inspection.  No discharge or erythema, or obvious scleral/conjunctival abnormalities.  RESP: No audible wheeze, cough, or visible cyanosis.  No visible retractions or increased work of breathing.    SKIN: Visible skin clear. No significant rash, abnormal pigmentation or lesions.  NEURO: Cranial nerves grossly intact.  Mentation and speech appropriate for age.  PSYCH: Mentation appears normal, affect normal/bright, judgement and insight intact, normal speech and appearance well-groomed.        1.  Hydrocele:  Surgery discussed.  Risks of bleeding/infection/post op pain/recurrence rate discussed.    2.  Vasectomy evaluation:  Patient is not interested in vasectomy.        Video-Visit Details    Video Start Time: 1000    Type of service:  Video Visit    Video End Time:10:13 AM    Originating Location (pt. Location): Home        Distant Location (provider location):  On-site    Platform used for Video Visit: Alexis

## 2022-11-20 ENCOUNTER — HEALTH MAINTENANCE LETTER (OUTPATIENT)
Age: 42
End: 2022-11-20

## 2022-12-05 DIAGNOSIS — N43.3 HYDROCELE: Primary | ICD-10-CM

## 2022-12-05 NOTE — PROGRESS NOTES
Patient coming for Covid Labs  on 12/12/22.  He has procedure with you 12/15/22.  Please place Covid orders.    Thanks, Johnny Chao, [facility],  on 12/5/2022 at 2:44 PM

## 2022-12-12 ENCOUNTER — OFFICE VISIT (OUTPATIENT)
Dept: FAMILY MEDICINE | Facility: CLINIC | Age: 42
End: 2022-12-12
Payer: COMMERCIAL

## 2022-12-12 VITALS
RESPIRATION RATE: 14 BRPM | SYSTOLIC BLOOD PRESSURE: 135 MMHG | TEMPERATURE: 97.9 F | HEART RATE: 73 BPM | BODY MASS INDEX: 27.87 KG/M2 | WEIGHT: 199.8 LBS | OXYGEN SATURATION: 98 % | DIASTOLIC BLOOD PRESSURE: 82 MMHG

## 2022-12-12 DIAGNOSIS — N43.3 HYDROCELE, UNSPECIFIED HYDROCELE TYPE: ICD-10-CM

## 2022-12-12 DIAGNOSIS — Z01.818 PREOP GENERAL PHYSICAL EXAM: Primary | ICD-10-CM

## 2022-12-12 PROCEDURE — U0003 INFECTIOUS AGENT DETECTION BY NUCLEIC ACID (DNA OR RNA); SEVERE ACUTE RESPIRATORY SYNDROME CORONAVIRUS 2 (SARS-COV-2) (CORONAVIRUS DISEASE [COVID-19]), AMPLIFIED PROBE TECHNIQUE, MAKING USE OF HIGH THROUGHPUT TECHNOLOGIES AS DESCRIBED BY CMS-2020-01-R: HCPCS | Performed by: FAMILY MEDICINE

## 2022-12-12 PROCEDURE — 99214 OFFICE O/P EST MOD 30 MIN: CPT | Performed by: FAMILY MEDICINE

## 2022-12-12 PROCEDURE — U0005 INFEC AGEN DETEC AMPLI PROBE: HCPCS | Performed by: FAMILY MEDICINE

## 2022-12-12 ASSESSMENT — PAIN SCALES - GENERAL: PAINLEVEL: NO PAIN (0)

## 2022-12-12 NOTE — H&P (VIEW-ONLY)
[facility]  [address] [facility] [address]  Phone: [phone]  Primary Provider: Zuleika Sagastume  Pre-op Performing Provider: ZULEIKA SAGASTUME      PREOPERATIVE EVALUATION:  Today's date: 12/12/2022    Raul Abdul is a 42 year old male who presents for a preoperative evaluation.    No chest pain or shortness of breath. No fevers or chills or cold symptoms   No nausea, vomiting or diarrhea - improved. No black/bloody stools. No urine changes.   S/p alisha a couple months ago and did ok.     Surgical Information:  Surgery/Procedure: HYDROCELECTOMY, SCROTAL APPROACH  Surgery Location: [facility]  Surgeon: Nahum Boles MD  Surgery Date: 12/15/2022  Time of Surgery: 7:30 AM  Where patient plans to recover: At home with family  Fax number for surgical facility: Note does not need to be faxed, will be available electronically in Epic.    Type of Anesthesia Anticipated: General    ASSESSMENT / PLAN:  (Z01.818) Preop general physical exam  (primary encounter diagnosis)  Comment: generally healthy and normal exam/recent labs. Denies chest pain or shortness of breath. Good exercise tolerance. No covid/cold symptoms   Plan: ok for surgery. Hold any asa/nsaids/ETOH.     (N43.3) Hydrocele, unspecified hydrocele type  Plan: per urology        Subjective     HPI related to upcoming procedure: hydrocele large/painful      Preop Questions 12/12/2022   1. Have you ever had a heart attack or stroke? No   2. Have you ever had surgery on your heart or blood vessels, such as a stent placement, a coronary artery bypass, or surgery on an artery in your head, neck, heart, or legs? No   3. Do you have chest pain with activity? No   4. Do you have a history of  heart failure? No   5. Do you currently have a cold, bronchitis or symptoms of other infection? No   6. Do you have a cough, shortness of breath, or wheezing? No   7. Do you or anyone in your  family have previous history of blood clots? No   8. Do you or does anyone in your family have a serious bleeding problem such as prolonged bleeding following surgeries or cuts? No   9. Have you ever had problems with anemia or been told to take iron pills? No   10. Have you had any abnormal blood loss such as black, tarry or bloody stools? No   11. Have you ever had a blood transfusion? No   12. Are you willing to have a blood transfusion if it is medically needed before, during, or after your surgery? NO -    13. Have you or any of your relatives ever had problems with anesthesia? No   14. Do you have sleep apnea, excessive snoring or daytime drowsiness? No   15. Do you have any artifical heart valves or other implanted medical devices like a pacemaker, defibrillator, or continuous glucose monitor? No   16. Do you have artificial joints? No   17. Are you allergic to latex? No     Health Care Directive:  Patient does not have a Health Care Directive or Living Will: N/A    Preoperative Review of :   reviewed - controlled substances reflected in medication list.    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     Priority: Medium     Dyshidrotic dermatitis 09/18/2012     Priority: Medium      Past Medical History:   Diagnosis Date     Tobacco abuse      Past Surgical History:   Procedure Laterality Date     AS REPAIR EPIGASTRIC HERNIA,REDUC       LAPAROSCOPIC CHOLECYSTECTOMY N/A 8/20/2022    Procedure: CHOLECYSTECTOMY, LAPAROSCOPIC;  Surgeon: Rosie Ritter MD;  Location: [facility] OR     Current Outpatient Medications   Medication Sig Dispense Refill     acetaminophen (TYLENOL) 325 MG tablet Take 650 mg by  mouth every 6 hours       ibuprofen (ADVIL/MOTRIN) 200 MG tablet Take 600 mg by mouth every 6 hours as needed for mild pain       omeprazole (PRILOSEC) 10 MG DR capsule Take 20 mg by mouth daily       ondansetron (ZOFRAN ODT) 4 MG ODT tab Take 1-2 tablets (4-8 mg) by mouth every 8 hours as needed for nausea Dissolve ON the tongue. (Patient not taking: Reported on 9/13/2022) 10 tablet 0     sildenafil (VIAGRA) 50 MG tablet TAKE 1 TABLET BY MOUTH ONCE DAILY AS NEEDED 50 tablet 1       No Known Allergies     Social History     Tobacco Use     Smoking status: Every Day     Packs/day: 1.00     Years: 20.00     Pack years: 20.00     Types: Cigarettes     Smokeless tobacco: Never   Substance Use Topics     Alcohol use: Yes     Alcohol/week: 0.0 standard drinks     Comment: Socially, rare. 6 beers in a month     History   Drug Use No         Objective     /82   Pulse 73   Temp 97.9  F (36.6  C) (Oral)   Resp 14   Wt 90.6 kg (199 lb 12.8 oz)   SpO2 98%   BMI 27.87 kg/m     Physical Exam  GENERAL APPEARANCE: healthy, alert and no distress  EYES: Eyes grossly normal to inspection, PERRL and conjunctivae and sclerae normal  HENT: ear canals and TM's normal and nose and mouth without ulcers or lesions  NECK: no adenopathy, no asymmetry, masses, or scars and thyroid normal to palpation  RESP: lungs clear to auscultation - no rales, rhonchi or wheezes  CV: regular rate and rhythm, normal S1 S2, no S3 or S4 and no murmur, click or rub   ABDOMEN: soft, nontender, no HSM or masses and bowel sounds normal  MS: extremities normal- no gross deformities noted  NEURO: Normal strength and tone, sensory exam grossly normal, mentation intact and speech normal  PSYCH: mentation appears normal and affect normal/bright    Recent Labs   Lab Test 10/12/22  0659 08/20/22  0527 08/19/22  1337   HGB 14.0 13.6 14.9    258 302   NA  --  143 137   POTASSIUM  --  3.3* 2.9*   CR  --  0.74 0.78        Diagnostics:  No labs were ordered  during this visit.   No EKG required for low risk surgery (cataract, skin procedure, breast biopsy, etc).    Revised Cardiac Risk Index (RCRI):  The patient has the following serious cardiovascular risks for perioperative complications:   - No serious cardiac risks = 0 points     RCRI Interpretation: 0 points: Class I (very low risk - 0.4% complication rate)           Signed Electronically by: Juan Gonzales MD  Copy of this evaluation report is provided to requesting physician.

## 2022-12-12 NOTE — PROGRESS NOTES
[facility]  [address] [facility] [address]  Phone: [phone]  Primary Provider: Zuleika Sagastume  Pre-op Performing Provider: ZULEIKA SAGASTUME      PREOPERATIVE EVALUATION:  Today's date: 12/12/2022    Raul Abdul is a 42 year old male who presents for a preoperative evaluation.    No chest pain or shortness of breath. No fevers or chills or cold symptoms   No nausea, vomiting or diarrhea - improved. No black/bloody stools. No urine changes.   S/p alisha a couple months ago and did ok.     Surgical Information:  Surgery/Procedure: HYDROCELECTOMY, SCROTAL APPROACH  Surgery Location: [facility]  Surgeon: Nahum Boles MD  Surgery Date: 12/15/2022  Time of Surgery: 7:30 AM  Where patient plans to recover: At home with family  Fax number for surgical facility: Note does not need to be faxed, will be available electronically in Epic.    Type of Anesthesia Anticipated: General    ASSESSMENT / PLAN:  (Z01.818) Preop general physical exam  (primary encounter diagnosis)  Comment: generally healthy and normal exam/recent labs. Denies chest pain or shortness of breath. Good exercise tolerance. No covid/cold symptoms   Plan: ok for surgery. Hold any asa/nsaids/ETOH.     (N43.3) Hydrocele, unspecified hydrocele type  Plan: per urology        Subjective     HPI related to upcoming procedure: hydrocele large/painful      Preop Questions 12/12/2022   1. Have you ever had a heart attack or stroke? No   2. Have you ever had surgery on your heart or blood vessels, such as a stent placement, a coronary artery bypass, or surgery on an artery in your head, neck, heart, or legs? No   3. Do you have chest pain with activity? No   4. Do you have a history of  heart failure? No   5. Do you currently have a cold, bronchitis or symptoms of other infection? No   6. Do you have a cough, shortness of breath, or wheezing? No   7. Do you or anyone in your  family have previous history of blood clots? No   8. Do you or does anyone in your family have a serious bleeding problem such as prolonged bleeding following surgeries or cuts? No   9. Have you ever had problems with anemia or been told to take iron pills? No   10. Have you had any abnormal blood loss such as black, tarry or bloody stools? No   11. Have you ever had a blood transfusion? No   12. Are you willing to have a blood transfusion if it is medically needed before, during, or after your surgery? NO -    13. Have you or any of your relatives ever had problems with anesthesia? No   14. Do you have sleep apnea, excessive snoring or daytime drowsiness? No   15. Do you have any artifical heart valves or other implanted medical devices like a pacemaker, defibrillator, or continuous glucose monitor? No   16. Do you have artificial joints? No   17. Are you allergic to latex? No     Health Care Directive:  Patient does not have a Health Care Directive or Living Will: N/A    Preoperative Review of :   reviewed - controlled substances reflected in medication list.    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     Priority: Medium     Dyshidrotic dermatitis 09/18/2012     Priority: Medium      Past Medical History:   Diagnosis Date     Tobacco abuse      Past Surgical History:   Procedure Laterality Date     AS REPAIR EPIGASTRIC HERNIA,REDUC       LAPAROSCOPIC CHOLECYSTECTOMY N/A 8/20/2022    Procedure: CHOLECYSTECTOMY, LAPAROSCOPIC;  Surgeon: Rosie Ritter MD;  Location: [facility] OR     Current Outpatient Medications   Medication Sig Dispense Refill     acetaminophen (TYLENOL) 325 MG tablet Take 650 mg by  mouth every 6 hours       ibuprofen (ADVIL/MOTRIN) 200 MG tablet Take 600 mg by mouth every 6 hours as needed for mild pain       omeprazole (PRILOSEC) 10 MG DR capsule Take 20 mg by mouth daily       ondansetron (ZOFRAN ODT) 4 MG ODT tab Take 1-2 tablets (4-8 mg) by mouth every 8 hours as needed for nausea Dissolve ON the tongue. (Patient not taking: Reported on 9/13/2022) 10 tablet 0     sildenafil (VIAGRA) 50 MG tablet TAKE 1 TABLET BY MOUTH ONCE DAILY AS NEEDED 50 tablet 1       No Known Allergies     Social History     Tobacco Use     Smoking status: Every Day     Packs/day: 1.00     Years: 20.00     Pack years: 20.00     Types: Cigarettes     Smokeless tobacco: Never   Substance Use Topics     Alcohol use: Yes     Alcohol/week: 0.0 standard drinks     Comment: Socially, rare. 6 beers in a month     History   Drug Use No         Objective     /82   Pulse 73   Temp 97.9  F (36.6  C) (Oral)   Resp 14   Wt 90.6 kg (199 lb 12.8 oz)   SpO2 98%   BMI 27.87 kg/m     Physical Exam  GENERAL APPEARANCE: healthy, alert and no distress  EYES: Eyes grossly normal to inspection, PERRL and conjunctivae and sclerae normal  HENT: ear canals and TM's normal and nose and mouth without ulcers or lesions  NECK: no adenopathy, no asymmetry, masses, or scars and thyroid normal to palpation  RESP: lungs clear to auscultation - no rales, rhonchi or wheezes  CV: regular rate and rhythm, normal S1 S2, no S3 or S4 and no murmur, click or rub   ABDOMEN: soft, nontender, no HSM or masses and bowel sounds normal  MS: extremities normal- no gross deformities noted  NEURO: Normal strength and tone, sensory exam grossly normal, mentation intact and speech normal  PSYCH: mentation appears normal and affect normal/bright    Recent Labs   Lab Test 10/12/22  0659 08/20/22  0527 08/19/22  1337   HGB 14.0 13.6 14.9    258 302   NA  --  143 137   POTASSIUM  --  3.3* 2.9*   CR  --  0.74 0.78        Diagnostics:  No labs were ordered  during this visit.   No EKG required for low risk surgery (cataract, skin procedure, breast biopsy, etc).    Revised Cardiac Risk Index (RCRI):  The patient has the following serious cardiovascular risks for perioperative complications:   - No serious cardiac risks = 0 points     RCRI Interpretation: 0 points: Class I (very low risk - 0.4% complication rate)           Signed Electronically by: Juan Gonzales MD  Copy of this evaluation report is provided to requesting physician.

## 2022-12-13 ENCOUNTER — ANESTHESIA EVENT (OUTPATIENT)
Dept: SURGERY | Facility: AMBULATORY SURGERY CENTER | Age: 42
End: 2022-12-13
Payer: COMMERCIAL

## 2022-12-13 LAB — SARS-COV-2 RNA RESP QL NAA+PROBE: NEGATIVE

## 2022-12-14 NOTE — ANESTHESIA PREPROCEDURE EVALUATION
Anesthesia Pre-Procedure Evaluation    Patient: Raul Abdul   MRN: [id number] : 1980        Procedure : Procedure(s):  HYDROCELECTOMY, SCROTAL APPROACH          Past Medical History:   Diagnosis Date     Tobacco abuse       Past Surgical History:   Procedure Laterality Date     AS REPAIR EPIGASTRIC HERNIA,REDUC       LAPAROSCOPIC CHOLECYSTECTOMY N/A 2022    Procedure: CHOLECYSTECTOMY, LAPAROSCOPIC;  Surgeon: Rosie Ritter MD;  Location: [facility] OR      No Known Allergies   Social History     Tobacco Use     Smoking status: Every Day     Packs/day: 1.00     Years: 20.00     Pack years: 20.00     Types: Cigarettes     Smokeless tobacco: Never   Substance Use Topics     Alcohol use: Yes     Alcohol/week: 0.0 standard drinks     Comment: Socially, rare. 6 beers in a month      Wt Readings from Last 1 Encounters:   22 90.6 kg (199 lb 12.8 oz)           Physical Exam    Airway        Mallampati: I   TM distance: > 3 FB   Neck ROM: full   Mouth opening: > 3 cm    Respiratory Devices and Support         Dental  no notable dental history         Cardiovascular   cardiovascular exam normal          Pulmonary   pulmonary exam normal                OUTSIDE LABS:  CBC:   Lab Results   Component Value Date    WBC 7.0 10/12/2022    WBC 10.4 2022    HGB 14.0 10/12/2022    HGB 13.6 2022    HCT 39.1 (L) 10/12/2022    HCT 39.6 (L) 2022     10/12/2022     2022     BMP:   Lab Results   Component Value Date     2022     2022    POTASSIUM 3.3 (L) 2022    POTASSIUM 2.9 (L) 2022    CHLORIDE 110 (H) 2022    CHLORIDE 103 2022    CO2 28 2022    CO2 27 2022    BUN 6 (L) 2022    BUN 7 2022    CR 0.74 2022    CR 0.78 2022     (H) 2022     (H) 2022     COAGS: No results found for: PTT, INR, FIBR  POC:   Lab Results   Component Value Date     (H) 2016     HEPATIC:   Lab  Results   Component Value Date    ALBUMIN 3.2 (L) 08/20/2022    PROTTOTAL 6.2 (L) 08/20/2022    ALT 19 08/20/2022    AST 14 08/20/2022    ALKPHOS 72 08/20/2022    BILITOTAL 0.5 08/20/2022     OTHER:   Lab Results   Component Value Date    CHLOE 8.4 (L) 08/20/2022    LIPASE 64 (L) 08/19/2022    TSH 2.08 02/18/2021       Anesthesia Plan    ASA Status:  2   NPO Status:  NPO Appropriate    Anesthesia Type: MAC.     - Reason for MAC: straight local not clinically adequate   Induction: Intravenous, Propofol.   Maintenance: TIVA.        Consents    Anesthesia Plan(s) and associated risks, benefits, and realistic alternatives discussed. Questions answered and patient/representative(s) expressed understanding.    - Discussed:     - Discussed with:  Patient      - Extended Intubation/Ventilatory Support Discussed: No.      - Patient is DNR/DNI Status: No    Use of blood products discussed: No .     Postoperative Care    Pain management: IV analgesics, Oral pain medications, Multi-modal analgesia.   PONV prophylaxis: Dexamethasone or Solumedrol, Ondansetron (or other 5HT-3), Background Propofol Infusion     Comments:                Brayden Verduzco MD

## 2022-12-15 ENCOUNTER — HOSPITAL ENCOUNTER (OUTPATIENT)
Facility: AMBULATORY SURGERY CENTER | Age: 42
Discharge: HOME | End: 2022-12-15
Attending: UROLOGY | Admitting: UROLOGY
Payer: COMMERCIAL

## 2022-12-15 ENCOUNTER — ANESTHESIA (OUTPATIENT)
Dept: SURGERY | Facility: AMBULATORY SURGERY CENTER | Age: 42
End: 2022-12-15
Payer: COMMERCIAL

## 2022-12-15 VITALS
SYSTOLIC BLOOD PRESSURE: 137 MMHG | DIASTOLIC BLOOD PRESSURE: 86 MMHG | BODY MASS INDEX: 27.75 KG/M2 | OXYGEN SATURATION: 97 % | RESPIRATION RATE: 16 BRPM | HEART RATE: 63 BPM | TEMPERATURE: 97.4 F | WEIGHT: 199 LBS

## 2022-12-15 DIAGNOSIS — N43.3 HYDROCELE IN ADULT: Primary | ICD-10-CM

## 2022-12-15 PROCEDURE — 55040 REMOVAL OF HYDROCELE: CPT | Mod: LT

## 2022-12-15 PROCEDURE — G8907 PT DOC NO EVENTS ON DISCHARG: HCPCS

## 2022-12-15 PROCEDURE — 88305 TISSUE EXAM BY PATHOLOGIST: CPT | Performed by: PATHOLOGY

## 2022-12-15 PROCEDURE — 11420 EXC H-F-NK-SP B9+MARG 0.5/<: CPT

## 2022-12-15 PROCEDURE — G8916 PT W IV AB GIVEN ON TIME: HCPCS

## 2022-12-15 PROCEDURE — 55040 REMOVAL OF HYDROCELE: CPT | Mod: LT | Performed by: UROLOGY

## 2022-12-15 RX ORDER — CEFAZOLIN SODIUM 2 G/100ML
2 INJECTION, SOLUTION INTRAVENOUS SEE ADMIN INSTRUCTIONS
Status: DISCONTINUED | OUTPATIENT
Start: 2022-12-15 | End: 2022-12-16 | Stop reason: HOSPADM

## 2022-12-15 RX ORDER — PROPOFOL 10 MG/ML
INJECTION, EMULSION INTRAVENOUS PRN
Status: DISCONTINUED | OUTPATIENT
Start: 2022-12-15 | End: 2022-12-15

## 2022-12-15 RX ORDER — LIDOCAINE 40 MG/G
CREAM TOPICAL
Status: DISCONTINUED | OUTPATIENT
Start: 2022-12-15 | End: 2022-12-16 | Stop reason: HOSPADM

## 2022-12-15 RX ORDER — SODIUM CHLORIDE, SODIUM LACTATE, POTASSIUM CHLORIDE, CALCIUM CHLORIDE 600; 310; 30; 20 MG/100ML; MG/100ML; MG/100ML; MG/100ML
INJECTION, SOLUTION INTRAVENOUS CONTINUOUS
Status: DISCONTINUED | OUTPATIENT
Start: 2022-12-15 | End: 2022-12-16 | Stop reason: HOSPADM

## 2022-12-15 RX ORDER — ONDANSETRON 2 MG/ML
4 INJECTION INTRAMUSCULAR; INTRAVENOUS EVERY 30 MIN PRN
Status: DISCONTINUED | OUTPATIENT
Start: 2022-12-15 | End: 2022-12-16 | Stop reason: HOSPADM

## 2022-12-15 RX ORDER — TRAMADOL HYDROCHLORIDE 50 MG/1
50 TABLET ORAL EVERY 6 HOURS PRN
Qty: 15 TABLET | Refills: 0 | Status: SHIPPED | OUTPATIENT
Start: 2022-12-15

## 2022-12-15 RX ORDER — LIDOCAINE HYDROCHLORIDE 20 MG/ML
INJECTION, SOLUTION INFILTRATION; PERINEURAL PRN
Status: DISCONTINUED | OUTPATIENT
Start: 2022-12-15 | End: 2022-12-15

## 2022-12-15 RX ORDER — FENTANYL CITRATE 50 UG/ML
50 INJECTION, SOLUTION INTRAMUSCULAR; INTRAVENOUS EVERY 5 MIN PRN
Status: DISCONTINUED | OUTPATIENT
Start: 2022-12-15 | End: 2022-12-16 | Stop reason: HOSPADM

## 2022-12-15 RX ORDER — ONDANSETRON 4 MG/1
4 TABLET, ORALLY DISINTEGRATING ORAL EVERY 30 MIN PRN
Status: DISCONTINUED | OUTPATIENT
Start: 2022-12-15 | End: 2022-12-16 | Stop reason: HOSPADM

## 2022-12-15 RX ORDER — FENTANYL CITRATE 50 UG/ML
25 INJECTION, SOLUTION INTRAMUSCULAR; INTRAVENOUS
Status: DISCONTINUED | OUTPATIENT
Start: 2022-12-15 | End: 2022-12-16 | Stop reason: HOSPADM

## 2022-12-15 RX ORDER — CEFAZOLIN SODIUM 2 G/100ML
2 INJECTION, SOLUTION INTRAVENOUS
Status: COMPLETED | OUTPATIENT
Start: 2022-12-15 | End: 2022-12-15

## 2022-12-15 RX ORDER — FENTANYL CITRATE 50 UG/ML
INJECTION, SOLUTION INTRAMUSCULAR; INTRAVENOUS PRN
Status: DISCONTINUED | OUTPATIENT
Start: 2022-12-15 | End: 2022-12-15

## 2022-12-15 RX ORDER — PROPOFOL 10 MG/ML
INJECTION, EMULSION INTRAVENOUS CONTINUOUS PRN
Status: DISCONTINUED | OUTPATIENT
Start: 2022-12-15 | End: 2022-12-15

## 2022-12-15 RX ORDER — GINSENG 100 MG
CAPSULE ORAL PRN
Status: DISCONTINUED | OUTPATIENT
Start: 2022-12-15 | End: 2022-12-15 | Stop reason: HOSPADM

## 2022-12-15 RX ORDER — METOPROLOL TARTRATE 1 MG/ML
1-2 INJECTION, SOLUTION INTRAVENOUS EVERY 5 MIN PRN
Status: DISCONTINUED | OUTPATIENT
Start: 2022-12-15 | End: 2022-12-16 | Stop reason: HOSPADM

## 2022-12-15 RX ORDER — KETOROLAC TROMETHAMINE 30 MG/ML
INJECTION, SOLUTION INTRAMUSCULAR; INTRAVENOUS PRN
Status: DISCONTINUED | OUTPATIENT
Start: 2022-12-15 | End: 2022-12-15

## 2022-12-15 RX ORDER — FENTANYL CITRATE 50 UG/ML
25 INJECTION, SOLUTION INTRAMUSCULAR; INTRAVENOUS EVERY 5 MIN PRN
Status: DISCONTINUED | OUTPATIENT
Start: 2022-12-15 | End: 2022-12-16 | Stop reason: HOSPADM

## 2022-12-15 RX ORDER — BUPIVACAINE HYDROCHLORIDE AND EPINEPHRINE 2.5; 5 MG/ML; UG/ML
INJECTION, SOLUTION INFILTRATION; PERINEURAL PRN
Status: DISCONTINUED | OUTPATIENT
Start: 2022-12-15 | End: 2022-12-15 | Stop reason: HOSPADM

## 2022-12-15 RX ORDER — ONDANSETRON 2 MG/ML
INJECTION INTRAMUSCULAR; INTRAVENOUS PRN
Status: DISCONTINUED | OUTPATIENT
Start: 2022-12-15 | End: 2022-12-15

## 2022-12-15 RX ORDER — ACETAMINOPHEN 325 MG/1
975 TABLET ORAL ONCE
Status: COMPLETED | OUTPATIENT
Start: 2022-12-15 | End: 2022-12-15

## 2022-12-15 RX ORDER — MEPERIDINE HYDROCHLORIDE 25 MG/ML
12.5 INJECTION INTRAMUSCULAR; INTRAVENOUS; SUBCUTANEOUS
Status: DISCONTINUED | OUTPATIENT
Start: 2022-12-15 | End: 2022-12-16 | Stop reason: HOSPADM

## 2022-12-15 RX ADMIN — FENTANYL CITRATE 25 MCG: 50 INJECTION, SOLUTION INTRAMUSCULAR; INTRAVENOUS at 07:59

## 2022-12-15 RX ADMIN — ACETAMINOPHEN 975 MG: 325 TABLET ORAL at 06:53

## 2022-12-15 RX ADMIN — CEFAZOLIN SODIUM 2 G: 2 INJECTION, SOLUTION INTRAVENOUS at 07:22

## 2022-12-15 RX ADMIN — FENTANYL CITRATE 25 MCG: 50 INJECTION, SOLUTION INTRAMUSCULAR; INTRAVENOUS at 08:07

## 2022-12-15 RX ADMIN — FENTANYL CITRATE 50 MCG: 50 INJECTION, SOLUTION INTRAMUSCULAR; INTRAVENOUS at 07:35

## 2022-12-15 RX ADMIN — PROPOFOL 75 MCG/KG/MIN: 10 INJECTION, EMULSION INTRAVENOUS at 07:32

## 2022-12-15 RX ADMIN — SODIUM CHLORIDE, SODIUM LACTATE, POTASSIUM CHLORIDE, CALCIUM CHLORIDE: 600; 310; 30; 20 INJECTION, SOLUTION INTRAVENOUS at 07:25

## 2022-12-15 RX ADMIN — KETOROLAC TROMETHAMINE 30 MG: 30 INJECTION, SOLUTION INTRAMUSCULAR; INTRAVENOUS at 07:45

## 2022-12-15 RX ADMIN — LIDOCAINE HYDROCHLORIDE 80 MG: 20 INJECTION, SOLUTION INFILTRATION; PERINEURAL at 07:29

## 2022-12-15 RX ADMIN — PROPOFOL 50 MG: 10 INJECTION, EMULSION INTRAVENOUS at 07:29

## 2022-12-15 RX ADMIN — ONDANSETRON 4 MG: 2 INJECTION INTRAMUSCULAR; INTRAVENOUS at 07:45

## 2022-12-15 RX ADMIN — SODIUM CHLORIDE, SODIUM LACTATE, POTASSIUM CHLORIDE, CALCIUM CHLORIDE: 600; 310; 30; 20 INJECTION, SOLUTION INTRAVENOUS at 07:07

## 2022-12-15 NOTE — ANESTHESIA CARE TRANSFER NOTE
Patient: Raul Abdul    Procedure: Procedure(s):  HYDROCELECTOMY, SCROTAL APPROACH, EXCISION OF SCROTAL SKIN LESION       Diagnosis: Hydrocele in adult [N43.3]  Diagnosis Additional Information: No value filed.    Anesthesia Type:   MAC     Note:    Oropharynx: oropharynx clear of all foreign objects  Level of Consciousness: awake  Oxygen Supplementation: room air    Independent Airway: airway patency satisfactory and stable  Dentition: dentition unchanged  Vital Signs Stable: post-procedure vital signs reviewed and stable  Report to RN Given: handoff report given  Patient transferred to: Phase II    Handoff Report: Identifed the Patient, Identified the Reponsible Provider, Reviewed the pertinent medical history, Discussed the surgical course, Reviewed Intra-OP anesthesia mangement and issues during anesthesia, Set expectations for post-procedure period and Allowed opportunity for questions and acknowledgement of understanding      Vitals:  Vitals Value Taken Time   BP     Temp     Pulse     Resp     SpO2         Electronically Signed By: GIULIANO Monzon CRNA  December 15, 2022  7:57 AM

## 2022-12-15 NOTE — DISCHARGE INSTRUCTIONS
To contact Dr Boles call:  [phone]    You had an IV form of Ibuprofen (Motrin) at 7:45 AM. You should not take any NSAIDS/Ibuprofen products until 1:45 PM.     You had 975 mg of Tylenol at 7 AM. You may repeat this after 1 PM. Maximum amount of Tylenol/Acetaminophen in a 24 hour period is 4,000 mg.      [facility]  Same-Day Surgery   Adult Discharge Orders & Instructions   For 24 hours after surgery  Get plenty of rest.  A responsible adult must stay with you for at least 24 hours after you leave the hospital.   Do not drive or use heavy equipment.  If you have weakness or tingling, don't drive or use heavy equipment until this feeling goes away.  Do not drink alcohol.  Avoid strenuous or risky activities.  Ask for help when climbing stairs.   You may feel lightheaded.  IF so, sit for a few minutes before standing.  Have someone help you get up.   If you have nausea (feel sick to your stomach): Drink only clear liquids such as apple juice, ginger ale, broth or 7-Up.  Rest may also help.  Be sure to drink enough fluids.  Move to a regular diet as you feel able.  You may have a slight fever. Call the doctor if your fever is over 100 F (37.7 C) (taken under the tongue) or lasts longer than 24 hours.  You may have a dry mouth, a sore throat, muscle aches or trouble sleeping.  These should go away after 24 hours.  Do not make important or legal decisions.   Call your doctor for any of the followin.  Signs of infection (fever, growing tenderness at the surgery site, a large amount of drainage or bleeding, severe pain, foul-smelling drainage, redness, swelling).  2. It has been over 8 to 10 hours since surgery and you are still not able to urinate (pass water).  3.  Headache for over 24 hours.  4.  Numbness, tingling or weakness the day after surgery (if you had spinal anesthesia).

## 2022-12-15 NOTE — ANESTHESIA POSTPROCEDURE EVALUATION
Patient: Raul Abdul    Procedure: Procedure(s):  HYDROCELECTOMY, SCROTAL APPROACH, EXCISION OF SCROTAL SKIN LESION       Anesthesia Type:  MAC    Note:  Disposition: Outpatient   Postop Pain Control: Uneventful            Sign Out: Well controlled pain   PONV:    Neuro/Psych: Uneventful            Sign Out: Acceptable/Baseline neuro status   Airway/Respiratory: Uneventful            Sign Out: Acceptable/Baseline resp. status   CV/Hemodynamics: Uneventful            Sign Out: Acceptable CV status; No obvious hypovolemia; No obvious fluid overload   Other NRE:    DID A NON-ROUTINE EVENT OCCUR?            Last vitals:  Vitals Value Taken Time   /86 12/15/22 0845   Temp 97.4  F (36.3  C) 12/15/22 0756   Pulse 63 12/15/22 0845   Resp 16 12/15/22 0815   SpO2 97 % 12/15/22 0845       Electronically Signed By: Brayden Verduzco MD  December 15, 2022  12:19 PM

## 2022-12-15 NOTE — OP NOTE
PREOPERATIVE DIAGNOSIS:  left hydrocele.      POSTOPERATIVE DIAGNOSIS: same/scrotal skin lesions     PROCEDURE:  left hydrocelectomy/excision of skin lesions     DESCRIPTION OF PROCEDURE:  The patient was brought to the operating room.  After sedation was induced, he was placed supine.  He was draped and prepped in the usual surgical fashion.  Preoperative antibiotic was given.  The median raphe of the scrotum was identified and local anesthetic was injected.  A small incision was made over the median raphe.  The incision was carried through the skin, dartos fascia until the hydrocele sac was identified.  The hydrocele sac was then opened with the fluid drained.  The testis, along with the hydrocele sac, was delivered outside of the incision.  The hydrocele sac was then everted and this was reapproximated using 2-0 Vicryl suture.  The testis was then delivered back into the scrotum.  The area was then irrigated.  The dartos fascia was then reapproximated using 3-0 chromic suture.  The same thing was done to the skin edges.  There were several small scrotal skin lesions found on right scrotal wall.  These were excised.  The patient tolerated the procedure well.  There were no complications identified during the procedure.  There was minimal bleeding during the operation.         CHERRI GARCIA MD

## 2022-12-15 NOTE — BRIEF OP NOTE
[facility]   Urology Brief Operative Note    Pre-operative diagnosis: Right hydrocele   Post-operative diagnosis Same  Scrotal lesions   Procedure: Procedure(s):  Right hydrocelectomy  Excision of scrotal skin lesions, small, multiple   Surgeon: Nahum Boles MD   Assistants(s): None   Anesthesia: Mac/loc    Estimated blood loss:  minimal    Total IV fluids: (See anesthesia record)   Blood transfusion: No transfusion was given during surgery   Total urine output: (See anesthesia record)  Not measured   Drains or packing: None   Specimens: none   Implants:     Findings: See op note   Complications: None   Condition on discharge: Stable   Comments: See dictated operative report for full details

## 2022-12-19 LAB
PATH REPORT.COMMENTS IMP SPEC: NORMAL
PATH REPORT.FINAL DX SPEC: NORMAL
PATH REPORT.GROSS SPEC: NORMAL
PATH REPORT.MICROSCOPIC SPEC OTHER STN: NORMAL
PATH REPORT.RELEVANT HX SPEC: NORMAL
PHOTO IMAGE: NORMAL

## 2023-01-13 ENCOUNTER — OFFICE VISIT (OUTPATIENT)
Dept: UROLOGY | Facility: CLINIC | Age: 43
End: 2023-01-13
Payer: COMMERCIAL

## 2023-01-13 VITALS — OXYGEN SATURATION: 99 % | HEART RATE: 86 BPM | DIASTOLIC BLOOD PRESSURE: 84 MMHG | SYSTOLIC BLOOD PRESSURE: 129 MMHG

## 2023-01-13 DIAGNOSIS — N43.3 HYDROCELE IN ADULT: Primary | ICD-10-CM

## 2023-01-13 DIAGNOSIS — N52.9 ERECTILE DYSFUNCTION, UNSPECIFIED ERECTILE DYSFUNCTION TYPE: ICD-10-CM

## 2023-01-13 PROCEDURE — 99024 POSTOP FOLLOW-UP VISIT: CPT | Performed by: UROLOGY

## 2023-01-13 RX ORDER — SILDENAFIL 100 MG/1
TABLET, FILM COATED ORAL
Qty: 30 TABLET | Refills: 11 | Status: SHIPPED | OUTPATIENT
Start: 2023-01-13 | End: 2024-06-11

## 2023-01-13 NOTE — PATIENT INSTRUCTIONS
Patient Education   Resources to Help You Quit Smoking  If you have quit smoking or are thinking about quitting, congratulations! It can be hard to quit smoking, but the benefits are well worth it. To help you quit and stay smoke-free, there are many resources that can help.  Your health plan  If you have health insurance, call them for more details about their phone coaching programs.  [facility]:  7-861-212-BLUE (1-969.907.2448)  [facility]:  1-100-683-QUIT (1-933.443.7291)  [facility]:  0-825-304-BLUE (1-570.268.1527)  [facility]:  [phone]  [facility]:  1-511.676.1983  [facility] members:  1-118.737.9439  [facility]:   [phone]  [facility]:  1-709.220.4487  [facility]:  1-973.702.9853  Other resources  American Cancer Society: 1-603.717.9762  The American Cancer Society can help you find local resources to quit smoking.  [employer]: 4-896-074-PLAN (1-184.423.3512)  Offers a telephone helpline, gum, patches and lozenges. These services are free for the uninsured and those without coverage. The online program is free to everyone at www.quitplan.com.  American Lung Association: 5-022-LUNG-USA (1-820.474.6807)  Provides a lung helpline as well as an online program, self-help book and group clinic support for quitting smoking. www.lung.org/stop-smoking  National Cancer [place]:  1-978-578-QUIT (1-777.918.7701)  Offers a telephone hotline, online text chat and a website with tools, information and support for smokers who want to quit. www.smokefree.gov  Medication Therapy Management (MTM):  [phone] (toll free)  mtm@[place].org  This is a clinic program to help you quit smoking. It offers one-on-one sessions with a pharmacist. Call or email to find out if your insurance covers MTM and to schedule an appointment at all locations.  For informational purposes only. Not to replace the  advice of your health care provider. Copyright   2013 [facility] [facility]. All rights reserved. Clinically reviewed by Renea Webber MD, [facility] Lung Cancer Screening Program. [employer] [id number] - Rev 06/19.

## 2023-01-13 NOTE — PROGRESS NOTES
Chief Complaint   Patient presents with     RECHECK       Raul PRIETO Chantell is a 42 year old male who presents today for follow up of   Chief Complaint   Patient presents with     RECHECK    f/u post hydrocelectomy.  He is without any complaints.    Current Outpatient Medications   Medication Sig Dispense Refill     ibuprofen (ADVIL/MOTRIN) 200 MG tablet Take 600 mg by mouth every 6 hours as needed for mild pain       omeprazole (PRILOSEC) 10 MG DR capsule Take 20 mg by mouth daily       sildenafil (VIAGRA) 100 MG tablet TAKE 1 TABLET BY MOUTH ONCE DAILY AS NEEDED 30 tablet 11     traMADol (ULTRAM) 50 MG tablet Take 1 tablet (50 mg) by mouth every 6 hours as needed for severe pain (7-10) 15 tablet 0     No Known Allergies   Past Medical History:   Diagnosis Date     Tobacco abuse      Past Surgical History:   Procedure Laterality Date     AS REPAIR EPIGASTRIC HERNIA,REDUC       HYDROCELECTOMY SCROTAL Left 12/15/2022    Procedure: HYDROCELECTOMY, SCROTAL APPROACH, EXCISION OF SCROTAL SKIN LESIONS;  Surgeon: Nahum Boles MD;  Location: [facility] OR     LAPAROSCOPIC CHOLECYSTECTOMY N/A 8/20/2022    Procedure: CHOLECYSTECTOMY, LAPAROSCOPIC;  Surgeon: Rosie Ritter MD;  Location: [facility] OR     Family History   Problem Relation Age of Onset     Diabetes Mother      Hypertension Mother      Atrial fibrillation Mother      Diabetes Father      Hypertension Father      Prostate Cancer Father      Heart Disease Father      Cancer Brother 40        Skin cancer, unsure which type     Social History     Socioeconomic History     Marital status:      Spouse name: None     Number of children: 2     Years of education: None     Highest education level: None   Occupational History     Occupation:    Tobacco Use     Smoking status: Every Day     Packs/day: 1.00     Years: 20.00     Pack years: 20.00     Types: Cigarettes     Smokeless tobacco: Never   Vaping Use     Vaping Use: Never used   Substance and Sexual  Activity     Alcohol use: Yes     Alcohol/week: 0.0 standard drinks     Comment: Socially, rare. 6 beers in a month     Drug use: No     Sexual activity: Yes     Partners: Female       REVIEW OF SYSTEMS  =================  C: NEGATIVE for fever, chills, change in weight  I: NEGATIVE for worrisome rashes, moles or lesions  E/M: NEGATIVE for ear, mouth and throat problems  R: NEGATIVE for significant cough or SHORTNESS OF BREATH  CV:  NEGATIVE for chest pain, palpitations or peripheral edema  GI: NEGATIVE for nausea, abdominal pain, heartburn, or change in bowel habits  NEURO: NEGATIVE numbness/weakness  : see HPI  PSYCH: NEGATIVE depression/anxiety  LYmph: no new enlarged lymph nodes  Ortho: no new trauma/movements    Physical Exam:  /84 (BP Location: Right arm, Patient Position: Chair, Cuff Size: Adult Large)   Pulse 86   SpO2 99%    Patient is pleasant, in no acute distress, good general condition.  Lung: no evidence of respiratory distress    Abdomen: Soft, nondistended, non tender. No masses. No rebound or guarding.   Exam: incision c/d/i  Skin: Warm and dry.  No redness.  Psych: normal mood and affect  Neuro: alert and oriented  Musculaskeletal: moving all extremities    Assessment/Plan:   (N43.3) Hydrocele in adult  (primary encounter diagnosis)  Comment:    Plan: doing well post op.  F/u prn.    (N52.9) Erectile dysfunction, unspecified erectile dysfunction type  Comment:    Plan: sildenafil (VIAGRA) 100 MG tablet

## 2023-04-07 ENCOUNTER — OFFICE VISIT (OUTPATIENT)
Dept: FAMILY MEDICINE | Facility: CLINIC | Age: 43
End: 2023-04-07
Payer: COMMERCIAL

## 2023-04-07 VITALS
RESPIRATION RATE: 16 BRPM | DIASTOLIC BLOOD PRESSURE: 75 MMHG | TEMPERATURE: 97.7 F | HEIGHT: 71 IN | WEIGHT: 200 LBS | BODY MASS INDEX: 28 KG/M2 | OXYGEN SATURATION: 95 % | SYSTOLIC BLOOD PRESSURE: 105 MMHG | HEART RATE: 88 BPM

## 2023-04-07 DIAGNOSIS — K21.9 GASTROESOPHAGEAL REFLUX DISEASE WITHOUT ESOPHAGITIS: ICD-10-CM

## 2023-04-07 DIAGNOSIS — Q18.1 CYST ON EAR: ICD-10-CM

## 2023-04-07 DIAGNOSIS — R19.7 DIARRHEA, UNSPECIFIED TYPE: Primary | ICD-10-CM

## 2023-04-07 LAB
ANION GAP SERPL CALCULATED.3IONS-SCNC: 5 MMOL/L (ref 3–14)
BASOPHILS # BLD AUTO: 0.1 10E3/UL (ref 0–0.2)
BASOPHILS NFR BLD AUTO: 1 %
BUN SERPL-MCNC: 7 MG/DL (ref 7–30)
CALCIUM SERPL-MCNC: 9.3 MG/DL (ref 8.5–10.1)
CHLORIDE BLD-SCNC: 101 MMOL/L (ref 94–109)
CO2 SERPL-SCNC: 31 MMOL/L (ref 20–32)
CREAT SERPL-MCNC: 0.95 MG/DL (ref 0.66–1.25)
EOSINOPHIL # BLD AUTO: 0.3 10E3/UL (ref 0–0.7)
EOSINOPHIL NFR BLD AUTO: 4 %
ERYTHROCYTE [DISTWIDTH] IN BLOOD BY AUTOMATED COUNT: 12.7 % (ref 10–15)
GFR SERPL CREATININE-BSD FRML MDRD: >90 ML/MIN/1.73M2
GLUCOSE BLD-MCNC: 113 MG/DL (ref 70–99)
HCT VFR BLD AUTO: 42.9 % (ref 40–53)
HGB BLD-MCNC: 14.8 G/DL (ref 13.3–17.7)
LYMPHOCYTES # BLD AUTO: 2.8 10E3/UL (ref 0.8–5.3)
LYMPHOCYTES NFR BLD AUTO: 40 %
MCH RBC QN AUTO: 32.2 PG (ref 26.5–33)
MCHC RBC AUTO-ENTMCNC: 34.5 G/DL (ref 31.5–36.5)
MCV RBC AUTO: 93 FL (ref 78–100)
MONOCYTES # BLD AUTO: 0.5 10E3/UL (ref 0–1.3)
MONOCYTES NFR BLD AUTO: 7 %
NEUTROPHILS # BLD AUTO: 3.4 10E3/UL (ref 1.6–8.3)
NEUTROPHILS NFR BLD AUTO: 49 %
PLATELET # BLD AUTO: 292 10E3/UL (ref 150–450)
POTASSIUM BLD-SCNC: 3.2 MMOL/L (ref 3.4–5.3)
RBC # BLD AUTO: 4.6 10E6/UL (ref 4.4–5.9)
SODIUM SERPL-SCNC: 137 MMOL/L (ref 133–144)
WBC # BLD AUTO: 7 10E3/UL (ref 4–11)

## 2023-04-07 PROCEDURE — 99214 OFFICE O/P EST MOD 30 MIN: CPT | Performed by: PHYSICIAN ASSISTANT

## 2023-04-07 PROCEDURE — 80048 BASIC METABOLIC PNL TOTAL CA: CPT | Performed by: PHYSICIAN ASSISTANT

## 2023-04-07 PROCEDURE — 85025 COMPLETE CBC W/AUTO DIFF WBC: CPT | Performed by: PHYSICIAN ASSISTANT

## 2023-04-07 PROCEDURE — 36415 COLL VENOUS BLD VENIPUNCTURE: CPT | Performed by: PHYSICIAN ASSISTANT

## 2023-04-07 ASSESSMENT — PAIN SCALES - GENERAL: PAINLEVEL: NO PAIN (0)

## 2023-04-07 NOTE — PROGRESS NOTES
"  Assessment & Plan     Diarrhea, unspecified type  Improved  - CBC with platelets and differential; Future  - Basic metabolic panel  (Ca, Cl, CO2, Creat, Gluc, K, Na, BUN); Future  - CBC with platelets and differential  - Basic metabolic panel  (Ca, Cl, CO2, Creat, Gluc, K, Na, BUN)  Was fatigued with large amount of diarrhea x 2 weeks and now is feeling better.     Cyst on ear  New  - Adult Dermatology Referral; Future  Refer to derm to have cyst on back of ear removed on left ear. Has had cyst removed on other ear likely due to wrestling     Gastroesophageal reflux disease without esophagitis  Ongoing  - omeprazole (PRILOSEC) 2 mg/mL suspension; Take 10 mLs (20 mg) by mouth every morning (before breakfast) for 60 days  Wanted to try suspension over pill          BMI:   Estimated body mass index is 27.89 kg/m  as calculated from the following:    Height as of this encounter: 1.803 m (5' 11\").    Weight as of this encounter: 90.7 kg (200 lb).   Weight management plan: Discussed healthy diet and exercise guidelines        TARIQ MONROE PA-C  [facility] KRISTINE Carter is a 42 year old, presenting for the following health issues:  Fatigue and Diarrhea                       View : No data to display.              History of Present Illness       Reason for visit:  Diareahh  Symptom onset:  3-4 weeks ago  Symptom intensity:  Moderate  Symptom progression:  Improving  Had these symptoms before:  Yes  Has tried/received treatment for these symptoms:  No    He eats 2-3 servings of fruits and vegetables daily.He consumes 4 sweetened beverage(s) daily.He exercises with enough effort to increase his heart rate 10 to 19 minutes per day.  He exercises with enough effort to increase his heart rate 3 or less days per week.   He is taking medications regularly.               Review of Systems   Constitutional, HEENT, cardiovascular, pulmonary, gi and gu systems are negative, except as otherwise " "noted.      Objective    /75   Pulse 88   Temp 97.7  F (36.5  C) (Tympanic)   Resp 16   Ht 1.803 m (5' 11\")   Wt 90.7 kg (200 lb)   SpO2 95%   BMI 27.89 kg/m    Body mass index is 27.89 kg/m .  Physical Exam   GENERAL: healthy, alert and no distress  NECK: no adenopathy, no asymmetry, masses, or scars and thyroid normal to palpation  RESP: lungs clear to auscultation - no rales, rhonchi or wheezes  CV: regular rate and rhythm, normal S1 S2, no S3 or S4, no murmur, click or rub, no peripheral edema and peripheral pulses strong  ABDOMEN: soft, nontender, no hepatosplenomegaly, no masses and bowel sounds normal  MS: no gross musculoskeletal defects noted, no edema                    "

## 2023-04-24 ENCOUNTER — OFFICE VISIT (OUTPATIENT)
Dept: DERMATOLOGY | Facility: CLINIC | Age: 43
End: 2023-04-24
Payer: COMMERCIAL

## 2023-04-24 DIAGNOSIS — L72.0 RUPTURED EPITHELIAL CYST: ICD-10-CM

## 2023-04-24 DIAGNOSIS — D22.9 BENIGN NEVUS: Primary | ICD-10-CM

## 2023-04-24 PROCEDURE — 11442 EXC FACE-MM B9+MARG 1.1-2 CM: CPT | Performed by: DERMATOLOGY

## 2023-04-24 PROCEDURE — 13151 CMPLX RPR E/N/E/L 1.1-2.5 CM: CPT | Performed by: DERMATOLOGY

## 2023-04-24 PROCEDURE — 99243 OFF/OP CNSLTJ NEW/EST LOW 30: CPT | Mod: 25 | Performed by: DERMATOLOGY

## 2023-04-24 PROCEDURE — 88304 TISSUE EXAM BY PATHOLOGIST: CPT | Performed by: DERMATOLOGY

## 2023-04-24 ASSESSMENT — PAIN SCALES - GENERAL: PAINLEVEL: NO PAIN (0)

## 2023-04-24 NOTE — LETTER
4/24/2023         RE: Raul Abdul  [address] [facility] [facility] [address]        Dear Colleague,    Thank you for referring your patient, Raul Abdul, to the [facility]. Please see a copy of my visit note below.    Raul Abdul , a 42 year old year old male patient, I was asked to see by CONNER Quiñonez for spot on ear.  Patient states this has been present for a while.  Patient reports the following symptoms:  Painful and draining.   .  Patient reports the following previous treatments none.  Patient reports the following modifying factors none.  Associated symptoms: none.  Patient has no other skin complaints today.  Remainder of the HPI, Meds, PMH, Allergies, FH, and SH was reviewed in chart.      Past Medical History:   Diagnosis Date     Tobacco abuse        Past Surgical History:   Procedure Laterality Date     AS REPAIR EPIGASTRIC HERNIA,REDUC       HYDROCELECTOMY SCROTAL Left 12/15/2022    Procedure: HYDROCELECTOMY, SCROTAL APPROACH, EXCISION OF SCROTAL SKIN LESIONS;  Surgeon: Nahum Boles MD;  Location:  OR     LAPAROSCOPIC CHOLECYSTECTOMY N/A 8/20/2022    Procedure: CHOLECYSTECTOMY, LAPAROSCOPIC;  Surgeon: Rosie Ritter MD;  Location: [facility] OR        Family History   Problem Relation Age of Onset     Diabetes Mother      Hypertension Mother      Atrial fibrillation Mother      Diabetes Father      Hypertension Father      Prostate Cancer Father      Heart Disease Father      Cancer Brother 40        Skin cancer, unsure which type       Social History     Socioeconomic History     Marital status:      Spouse name: Not on file     Number of children: 2     Years of education: Not on file     Highest education level: Not on file   Occupational History     Occupation:    Tobacco Use     Smoking status: Every Day     Packs/day: 1.00     Years: 20.00     Pack years: 20.00     Types: Cigarettes     Smokeless tobacco: Never   Vaping Use     Vaping status:  Never Used   Substance and Sexual Activity     Alcohol use: Yes     Alcohol/week: 0.0 standard drinks of alcohol     Comment: Socially, rare. 6 beers in a month     Drug use: No     Sexual activity: Yes     Partners: Female   Other Topics Concern     Parent/sibling w/ CABG, MI or angioplasty before 65F 55M? Not Asked   Social History Narrative     Not on file     Social Determinants of Health     Financial Resource Strain: Not on file   Food Insecurity: Not on file   Transportation Needs: Not on file   Physical Activity: Not on file   Stress: Not on file   Social Connections: Not on file   Intimate Partner Violence: Not on file   Housing Stability: Not on file       Outpatient Encounter Medications as of 4/24/2023   Medication Sig Dispense Refill     ibuprofen (ADVIL/MOTRIN) 200 MG tablet Take 600 mg by mouth every 6 hours as needed for mild pain       omeprazole (PRILOSEC) 10 MG DR capsule Take 20 mg by mouth daily       omeprazole (PRILOSEC) 2 mg/mL suspension Take 10 mLs (20 mg) by mouth every morning (before breakfast) for 60 days 600 mL 0     sildenafil (VIAGRA) 100 MG tablet TAKE 1 TABLET BY MOUTH ONCE DAILY AS NEEDED 30 tablet 11     traMADol (ULTRAM) 50 MG tablet Take 1 tablet (50 mg) by mouth every 6 hours as needed for severe pain (7-10) 15 tablet 0     No facility-administered encounter medications on file as of 4/24/2023.             Review Of Systems  Skin: As above  Eyes: negative  Ears/Nose/Throat: negative  Respiratory: No shortness of breath, dyspnea on exertion, cough, or hemoptysis  Cardiovascular: negative  Gastrointestinal: negative  Genitourinary: negative  Musculoskeletal: negative  Neurologic: negative  Psychiatric: negative  Hematologic/Lymphatic/Immunologic: negative  Endocrine: negative      O:   NAD, WDWN, Alert & Oriented, Mood & Affect wnl, Vitals stable   Here today alone   General appearance gabriella ii   Vitals stable   Alert, oriented and in no acute distress   L earlobe 1.3cm ulcerated  plaque  Benign nevi on face   Flesh colored papules on face      Eyes: Conjunctivae/lids:Normal     ENT: Lips, buccal mucosa, tongue: normal    MSK:Normal    Cardiovascular: peripheral edema none    Pulm: Breathing Normal    Neuro/Psych: Orientation:Normal; Mood/Affect:Normal      A/P:  1. Nevi, dermal nevus  2. Ruptured cyst  Excision discussed with patient   Scar discussed with patient   It was a pleasure speaking to Raul Abdul today.  Previous clinic  notes and pertinent laboratory tests were reviewed prior to Raul Abdul's visit.  Nature of benign skin lesions dicussed with patient.  PROCEDURE NOTE  L earlobe ruptured cyst  EXCISION OF CYST AND COMPLEX: After thorough discussion of PGACAC, consent obtained, anesthesia and prep, the margins of the cyst were identified and an incision was made encompassing the cyst. The incisions were made through the skin and down to and including the subcutaneous tissue. The cyst was removed en bloc and submitted for permanent pathologic review. Because of location on earlobe, The wound edges were widely undermined until adequate tissue mobility was obtained. hemostasis was achieved. The wound edges were then closed in a layered fashion, being careful not to leave any dead space. Postoperative length was 1.5 cm.   EBL minimal; complications none; wound care routine. The patient was discharged in good condition and will return in one week for wound evaluation.        Again, thank you for allowing me to participate in the care of your patient.        Sincerely,        Nahum Elkins MD

## 2023-04-24 NOTE — LETTER
"April 27, 2023      Raul CONNER Abdul  [address] [facility] [address]        Dear ,    We are writing to inform you of your test results.    A(1). L earlobe:   - Epidermal inclusion cyst with partial rupture -     No further treatment     Resulted Orders   Dermatological Path Order and Indications   Result Value Ref Range    Case Report       Surgical Pathology Report                         Case: [id number]                                  Authorizing Provider:  Nahum Elkins,   Collected:           04/24/2023 01:46 PM                                 MD                                                                           Ordering Location:     [facility]   Received:            04/24/2023 03:02 PM                                 [place]                                                                      Pathologist:           Prashant Horton MD                                                       Specimen:    Skin, L earlobe                                                                            Final Diagnosis       A(1). L earlobe:  - Epidermal inclusion cyst with partial rupture - (see description)       Clinical Information       The patient is a 42 year old male.      Gross Description       A(1). Skin, L earlobe:  The specimen is received in formalin with proper patient identification, labeled \"L earlobe\".  The specimen consists of a 1.7 x 1.2 cm unoriented tan skin ellipse excised to an average depth of 0.6 cm.  The specimen is remarkable for a 1.2 x 1.0 x 0.7 cm partially disrupted thin-walled, smooth lined cyst which forms an area of disruption on the skin surface.  No skin lesions are identified.  The cyst is entirely submitted in cassette A1.       Microscopic Description       The specimen exhibits a dilated cystic cavity filled with compact orthokeratosis and lined by keratinizing squamous epithelium with a granular layer. Adjacent to the cyst, the " specimen exhibits a dense mixed cell infiltrate rich in neutrophils adjacent to fragments of squamous epithelium and/or keratin, consistent with partial cyst wall rupture.        Performing Labs       The technical component of this testing was completed at [facility]           If you have any questions or concerns, please call the clinic at the number listed above.       Sincerely,      Nahum Elkins MD/Maite Jansen LPN

## 2023-04-24 NOTE — PATIENT INSTRUCTIONS
Sutured Wound Care     [facility]: [phone]    [facility]: [phone]    Follow up in 3 months with Dr. Elkins          No strenuous activity for 48 hours. Resume moderate activity in 48 hours. No heavy exercising until you are seen for follow up in one week.     Take Tylenol as needed for discomfort.                         Do not drink alcoholic beverages for 48 hours.     Keep the pressure bandage in place for 24 hours. If the bandage becomes blood tinged or loose, reinforce it with gauze and tape.        (Refer to the reverse side of this page for management of bleeding).    Remove pressure bandage in 24 hours     Leave the flat bandage in place until your follow up appointment.    Keep the bandage dry. Wash around it carefully.    If the tape becomes soiled or starts to come off, reinforce it with additional paper tape.    Do not smoke for 3 weeks; smoking is detrimental to wound healing.    It is normal to have swelling and bruising around the surgical site. The bruising will fade in approximately 10-14 days. Elevate the area to reduce swelling.    Numbness, itchiness and sensitivity to temperature changes can occur after surgery and may take up to 18 months to normalize.      POSSIBLE COMPLICATIONS    BLEEDING:    Leave the bandage in place.  Use tightly rolled up gauze or a cloth to apply direct pressure over the bandage for 20   minutes.  Reapply pressure for an additional 20 minutes if necessary  Call the office or go to the nearest emergency room if pressure fails to stop the bleeding.  Use additional gauze and tape to maintain pressure once the bleeding has stopped.        PAIN:    Post operative pain should slowly get better, never worse.  A severe increase in pain may indicate a problem. Call the office if this occurs.    In case of emergency phone:Dr Elkins [phone]

## 2023-04-24 NOTE — PROGRESS NOTES
Raul Abdul , a 42 year old year old male patient, I was asked to see by CONNER Quiñonez for spot on ear.  Patient states this has been present for a while.  Patient reports the following symptoms:  Painful and draining.   .  Patient reports the following previous treatments none.  Patient reports the following modifying factors none.  Associated symptoms: none.  Patient has no other skin complaints today.  Remainder of the HPI, Meds, PMH, Allergies, FH, and SH was reviewed in chart.      Past Medical History:   Diagnosis Date     Tobacco abuse        Past Surgical History:   Procedure Laterality Date     AS REPAIR EPIGASTRIC HERNIA,REDUC       HYDROCELECTOMY SCROTAL Left 12/15/2022    Procedure: HYDROCELECTOMY, SCROTAL APPROACH, EXCISION OF SCROTAL SKIN LESIONS;  Surgeon: Nahum Boles MD;  Location: [facility] OR     LAPAROSCOPIC CHOLECYSTECTOMY N/A 8/20/2022    Procedure: CHOLECYSTECTOMY, LAPAROSCOPIC;  Surgeon: Rosie Ritter MD;  Location: [facility] OR        Family History   Problem Relation Age of Onset     Diabetes Mother      Hypertension Mother      Atrial fibrillation Mother      Diabetes Father      Hypertension Father      Prostate Cancer Father      Heart Disease Father      Cancer Brother 40        Skin cancer, unsure which type       Social History     Socioeconomic History     Marital status:      Spouse name: Not on file     Number of children: 2     Years of education: Not on file     Highest education level: Not on file   Occupational History     Occupation:    Tobacco Use     Smoking status: Every Day     Packs/day: 1.00     Years: 20.00     Pack years: 20.00     Types: Cigarettes     Smokeless tobacco: Never   Vaping Use     Vaping status: Never Used   Substance and Sexual Activity     Alcohol use: Yes     Alcohol/week: 0.0 standard drinks of alcohol     Comment: Socially, rare. 6 beers in a month     Drug use: No     Sexual activity: Yes     Partners: Female   Other Topics Concern      Parent/sibling w/ CABG, MI or angioplasty before 65F 55M? Not Asked   Social History Narrative     Not on file     Social Determinants of Health     Financial Resource Strain: Not on file   Food Insecurity: Not on file   Transportation Needs: Not on file   Physical Activity: Not on file   Stress: Not on file   Social Connections: Not on file   Intimate Partner Violence: Not on file   Housing Stability: Not on file       Outpatient Encounter Medications as of 4/24/2023   Medication Sig Dispense Refill     ibuprofen (ADVIL/MOTRIN) 200 MG tablet Take 600 mg by mouth every 6 hours as needed for mild pain       omeprazole (PRILOSEC) 10 MG DR capsule Take 20 mg by mouth daily       omeprazole (PRILOSEC) 2 mg/mL suspension Take 10 mLs (20 mg) by mouth every morning (before breakfast) for 60 days 600 mL 0     sildenafil (VIAGRA) 100 MG tablet TAKE 1 TABLET BY MOUTH ONCE DAILY AS NEEDED 30 tablet 11     traMADol (ULTRAM) 50 MG tablet Take 1 tablet (50 mg) by mouth every 6 hours as needed for severe pain (7-10) 15 tablet 0     No facility-administered encounter medications on file as of 4/24/2023.             Review Of Systems  Skin: As above  Eyes: negative  Ears/Nose/Throat: negative  Respiratory: No shortness of breath, dyspnea on exertion, cough, or hemoptysis  Cardiovascular: negative  Gastrointestinal: negative  Genitourinary: negative  Musculoskeletal: negative  Neurologic: negative  Psychiatric: negative  Hematologic/Lymphatic/Immunologic: negative  Endocrine: negative      O:   NAD, WDWN, Alert & Oriented, Mood & Affect wnl, Vitals stable   Here today alone   General appearance gabriella ii   Vitals stable   Alert, oriented and in no acute distress   L earlobe 1.3cm ulcerated plaque  Benign nevi on face   Flesh colored papules on face      Eyes: Conjunctivae/lids:Normal     ENT: Lips, buccal mucosa, tongue: normal    MSK:Normal    Cardiovascular: peripheral edema none    Pulm: Breathing Normal    Neuro/Psych:  Orientation:Normal; Mood/Affect:Normal      A/P:  1. Nevi, dermal nevus  2. Ruptured cyst  Excision discussed with patient   Scar discussed with patient   It was a pleasure speaking to Raul Abdul today.  Previous clinic  notes and pertinent laboratory tests were reviewed prior to Raul Abdul's visit.  Nature of benign skin lesions dicussed with patient.  PROCEDURE NOTE  L earlobe ruptured cyst  EXCISION OF CYST AND COMPLEX: After thorough discussion of PGACAC, consent obtained, anesthesia and prep, the margins of the cyst were identified and an incision was made encompassing the cyst. The incisions were made through the skin and down to and including the subcutaneous tissue. The cyst was removed en bloc and submitted for permanent pathologic review. Because of location on earlobe, The wound edges were widely undermined until adequate tissue mobility was obtained. hemostasis was achieved. The wound edges were then closed in a layered fashion, being careful not to leave any dead space. Postoperative length was 1.5 cm.   EBL minimal; complications none; wound care routine. The patient was discharged in good condition and will return in one week for wound evaluation.

## 2023-04-27 LAB
PATH REPORT.COMMENTS IMP SPEC: NORMAL
PATH REPORT.COMMENTS IMP SPEC: NORMAL
PATH REPORT.FINAL DX SPEC: NORMAL
PATH REPORT.GROSS SPEC: NORMAL
PATH REPORT.MICROSCOPIC SPEC OTHER STN: NORMAL
PATH REPORT.RELEVANT HX SPEC: NORMAL

## 2023-05-01 ENCOUNTER — ALLIED HEALTH/NURSE VISIT (OUTPATIENT)
Dept: DERMATOLOGY | Facility: CLINIC | Age: 43
End: 2023-05-01
Payer: COMMERCIAL

## 2023-05-01 DIAGNOSIS — Z48.01 ENCOUNTER FOR CHANGE OR REMOVAL OF SURGICAL WOUND DRESSING: Primary | ICD-10-CM

## 2023-05-01 PROCEDURE — 99207 PR NO CHARGE NURSE ONLY: CPT

## 2023-05-01 NOTE — PATIENT INSTRUCTIONS
WOUND CARE INSTRUCTIONS  for  ONE WEEK AFTER SURGERY          Leave flat bandage on your skin for one week after today s bandage change.  In one week when you remove the bandage, you may resume your regular skin care routine, including washing with mild soap and water, applying moisturizer, make-up and sunscreen.    If there are any open or bleeding areas at the incision/graft site you should begin to cover the area with a bandage daily as follows:    Clean and dry the area with plain tap water using a Q-tip or sterile gauze pad.  Apply Polysporin or Bacitracin ointment to the open area.  Cover the wound with a band-aid or a sterile non-stick gauze pad and micropore paper tape.         SIGNS OF INFECTION  - If you notice any of these signs of infection, call your doctor right away: expanding redness around the wound.  - Yellow or greenish-colored pus or cloudy wound drainage.    - Red streaking spreading from the wound.  - Increased swelling, tenderness, or pain around the wound.   - Fever.    Please remember that yellow and clear drainage from a wound can be normal and related to normal wound healing.  Isolated drainage from a wound without a combination of the above features does not indicate infection.       *Once the bandages are removed, the scar will be red and firm (especially in the lip/chin area). This is normal and will fade in time. It might take 6-12 months for this to happen.     *Massaging the area will help the scar soften and fade quicker. Begin to massage the area one month after the bandages have been removed. To massage apply pressure directly and firmly over the scar with the fingertips and move in a circular motion. Massage the area for a few minutes several times a day. Continue to massage the site for several months.    *Approximately 6-8 weeks after surgery it is not uncommon to see the formation of  tender pimple-like  bump along the scar. This is normal. As the scar continues to mature and  the stitches underneath the skin begin to dissolve, this might occur. Do not pick or squeeze, this will resolve on it s own. Should one break open producing a small amount of drainage, apply Polysporin or Bacitracin ointment a few times a day until the wound is completely healed.    *Numbness in the surgical area is expected. It might take 12-18 months for the feeling to return to normal. During this time sensations of itchiness, tingling and occasional sharp pains might be noted. These feelings are normal and will subside once the nerves have completely healed.         IN CASE OF EMERGENCY: Dr Elkins [phone]     If you were seen in [place] call: [phone]    If you were seen in [place] call: [phone]

## 2023-05-01 NOTE — PROGRESS NOTES
Raul CONNER Chantell comes into clinic today at the request of Dr. Elkins Ordering Provider for Wound Check Action taken: See Below.    This service provided today was under the supervising provider of the day Dr. Elkins, who was available if needed.    Pt returned to clinic for post surgery 1 week follow up bandage change. Pt has no complaints, denies pain. Bandage removed from left posterior ear, area cleansed with normal saline. Site is healing and wound edges approximating well. Reapplied new steri strips and paper tape.    Advised to watch for signs/sx of infection; spreading redness, drainage, odor, fever. Call or report promptly to clinic. Pt given written instructions and informed to rtc as needed. Patient verbalized understanding.     Norma KLEIN,  CMA

## 2023-08-01 ENCOUNTER — OFFICE VISIT (OUTPATIENT)
Dept: DERMATOLOGY | Facility: CLINIC | Age: 43
End: 2023-08-01
Payer: COMMERCIAL

## 2023-08-01 DIAGNOSIS — L72.0 EPIDERMAL CYST: ICD-10-CM

## 2023-08-01 DIAGNOSIS — D18.01 ANGIOMA OF SKIN: ICD-10-CM

## 2023-08-01 DIAGNOSIS — D23.9 DERMAL NEVUS: ICD-10-CM

## 2023-08-01 DIAGNOSIS — L81.4 LENTIGO: ICD-10-CM

## 2023-08-01 DIAGNOSIS — D22.9 MULTIPLE BENIGN NEVI: Primary | ICD-10-CM

## 2023-08-01 PROCEDURE — 99213 OFFICE O/P EST LOW 20 MIN: CPT | Performed by: DERMATOLOGY

## 2023-08-01 NOTE — LETTER
8/1/2023         RE: Ralu Abdul  [id number] [facility] [id number]        Dear Colleague,    Thank you for referring your patient, Raul Abdul, to the [facility]. Please see a copy of my visit note below.    Raul Abdul is an extremely pleasant 43 year old year old male patient here today for mole son trunk.  .   Patient states this has been present for a while.  Patient reports the following symptoms:  none.  Patient reports the following previous treatments none.  These treatments did not work.  Patient reports the following modifying factors none.  Associated symptoms: none.  Patient has no other skin complaints today.  Remainder of the HPI, Meds, PMH, Allergies, FH, and SH was reviewed in chart.      Past Medical History:   Diagnosis Date     Tobacco abuse        Past Surgical History:   Procedure Laterality Date     AS REPAIR EPIGASTRIC HERNIA,REDUC       HYDROCELECTOMY SCROTAL Left 12/15/2022    Procedure: HYDROCELECTOMY, SCROTAL APPROACH, EXCISION OF SCROTAL SKIN LESIONS;  Surgeon: Nahum Boles MD;  Location:  OR     LAPAROSCOPIC CHOLECYSTECTOMY N/A 8/20/2022    Procedure: CHOLECYSTECTOMY, LAPAROSCOPIC;  Surgeon: Rosie Ritter MD;  Location: [facility] OR        Family History   Problem Relation Age of Onset     Diabetes Mother      Hypertension Mother      Atrial fibrillation Mother      Diabetes Father      Hypertension Father      Prostate Cancer Father      Heart Disease Father      Cancer Brother 40        Skin cancer, unsure which type       Social History     Socioeconomic History     Marital status:      Spouse name: Not on file     Number of children: 2     Years of education: Not on file     Highest education level: Not on file   Occupational History     Occupation:    Tobacco Use     Smoking status: Every Day     Packs/day: 1.00     Years: 20.00     Pack years: 20.00     Types: Cigarettes     Smokeless tobacco: Never   Vaping Use      Vaping Use: Never used   Substance and Sexual Activity     Alcohol use: Yes     Alcohol/week: 0.0 standard drinks of alcohol     Comment: Socially, rare. 6 beers in a month     Drug use: No     Sexual activity: Yes     Partners: Female   Other Topics Concern     Parent/sibling w/ CABG, MI or angioplasty before 65F 55M? Not Asked   Social History Narrative     Not on file     Social Determinants of Health     Financial Resource Strain: Not on file   Food Insecurity: Not on file   Transportation Needs: Not on file   Physical Activity: Not on file   Stress: Not on file   Social Connections: Not on file   Intimate Partner Violence: Not on file   Housing Stability: Not on file       Outpatient Encounter Medications as of 8/1/2023   Medication Sig Dispense Refill     ibuprofen (ADVIL/MOTRIN) 200 MG tablet Take 600 mg by mouth every 6 hours as needed for mild pain       omeprazole (PRILOSEC) 10 MG DR capsule Take 20 mg by mouth daily       sildenafil (VIAGRA) 100 MG tablet TAKE 1 TABLET BY MOUTH ONCE DAILY AS NEEDED 30 tablet 11     traMADol (ULTRAM) 50 MG tablet Take 1 tablet (50 mg) by mouth every 6 hours as needed for severe pain (7-10) 15 tablet 0     No facility-administered encounter medications on file as of 8/1/2023.             O:   NAD, WDWN, Alert & Oriented, Mood & Affect wnl, Vitals stable   Here today alone   General appearance normal   Vitals stable   Alert, oriented and in no acute distress  R earlobe firm nodule  pigmented macules on trunk and ext with regular borders and pigment networks chest red and flesh colored and brown papule  brown macules on trunk and ext   Red papules on trunk  Flesh colored papules on trunk         Eyes: Conjunctivae/lids:Normal     ENT: Lips, buccal mucosa, tongue: normal    MSK:Normal    Cardiovascular: peripheral edema none    Pulm: Breathing Normal    Neuro/Psych: Orientation:Alert and Orientedx3 ; Mood/Affect:normal       A/P:  1. Nevi, epidermal cyst , lentigo, angioma,  dermal nevus  It was a pleasure speaking to Raul Abdul today.  Previous clinic notes and pertinent laboratory tests were reviewed prior to Raul Abdul's visit.  Nature and genetics of benign skin lesions dicussed with patient.  Signs and Symptoms of skin cancer discussed with patient.  Patient encouraged to perform monthly skin exams.  UV precautions reviewed with patient.  Return to clinic 12 months      Again, thank you for allowing me to participate in the care of your patient.        Sincerely,        Nahum Elkins MD

## 2023-08-01 NOTE — PROGRESS NOTES
Raul Abdul is an extremely pleasant 43 year old year old male patient here today for mole son trunk.  .   Patient states this has been present for a while.  Patient reports the following symptoms:  none.  Patient reports the following previous treatments none.  These treatments did not work.  Patient reports the following modifying factors none.  Associated symptoms: none.  Patient has no other skin complaints today.  Remainder of the HPI, Meds, PMH, Allergies, FH, and SH was reviewed in chart.      Past Medical History:   Diagnosis Date    Tobacco abuse        Past Surgical History:   Procedure Laterality Date    AS REPAIR EPIGASTRIC HERNIA,REDUC      HYDROCELECTOMY SCROTAL Left 12/15/2022    Procedure: HYDROCELECTOMY, SCROTAL APPROACH, EXCISION OF SCROTAL SKIN LESIONS;  Surgeon: Nahum Boles MD;  Location: [facility] OR    LAPAROSCOPIC CHOLECYSTECTOMY N/A 8/20/2022    Procedure: CHOLECYSTECTOMY, LAPAROSCOPIC;  Surgeon: Rosie Ritter MD;  Location: [facility] OR        Family History   Problem Relation Age of Onset    Diabetes Mother     Hypertension Mother     Atrial fibrillation Mother     Diabetes Father     Hypertension Father     Prostate Cancer Father     Heart Disease Father     Cancer Brother 40        Skin cancer, unsure which type       Social History     Socioeconomic History    Marital status:      Spouse name: Not on file    Number of children: 2    Years of education: Not on file    Highest education level: Not on file   Occupational History    Occupation:    Tobacco Use    Smoking status: Every Day     Packs/day: 1.00     Years: 20.00     Pack years: 20.00     Types: Cigarettes    Smokeless tobacco: Never   Vaping Use    Vaping Use: Never used   Substance and Sexual Activity    Alcohol use: Yes     Alcohol/week: 0.0 standard drinks of alcohol     Comment: Socially, rare. 6 beers in a month    Drug use: No    Sexual activity: Yes     Partners: Female   Other Topics Concern     Parent/sibling w/ CABG, MI or angioplasty before 65F 55M? Not Asked   Social History Narrative    Not on file     Social Determinants of Health     Financial Resource Strain: Not on file   Food Insecurity: Not on file   Transportation Needs: Not on file   Physical Activity: Not on file   Stress: Not on file   Social Connections: Not on file   Intimate Partner Violence: Not on file   Housing Stability: Not on file       Outpatient Encounter Medications as of 8/1/2023   Medication Sig Dispense Refill    ibuprofen (ADVIL/MOTRIN) 200 MG tablet Take 600 mg by mouth every 6 hours as needed for mild pain      omeprazole (PRILOSEC) 10 MG DR capsule Take 20 mg by mouth daily      sildenafil (VIAGRA) 100 MG tablet TAKE 1 TABLET BY MOUTH ONCE DAILY AS NEEDED 30 tablet 11    traMADol (ULTRAM) 50 MG tablet Take 1 tablet (50 mg) by mouth every 6 hours as needed for severe pain (7-10) 15 tablet 0     No facility-administered encounter medications on file as of 8/1/2023.             O:   NAD, WDWN, Alert & Oriented, Mood & Affect wnl, Vitals stable   Here today alone   General appearance normal   Vitals stable   Alert, oriented and in no acute distress  R earlobe firm nodule  pigmented macules on trunk and ext with regular borders and pigment networks chest red and flesh colored and brown papule  brown macules on trunk and ext   Red papules on trunk  Flesh colored papules on trunk         Eyes: Conjunctivae/lids:Normal     ENT: Lips, buccal mucosa, tongue: normal    MSK:Normal    Cardiovascular: peripheral edema none    Pulm: Breathing Normal    Neuro/Psych: Orientation:Alert and Orientedx3 ; Mood/Affect:normal       A/P:  1. Nevi, epidermal cyst , lentigo, angioma, dermal nevus  It was a pleasure speaking to Raul Abdul today.  Previous clinic notes and pertinent laboratory tests were reviewed prior to Raul Abdul's visit.  Nature and genetics of benign skin lesions dicussed with patient.  Signs and Symptoms of skin cancer  discussed with patient.  Patient encouraged to perform monthly skin exams.  UV precautions reviewed with patient.  Return to clinic 12 months

## 2023-08-14 ENCOUNTER — PATIENT OUTREACH (OUTPATIENT)
Dept: CARE COORDINATION | Facility: CLINIC | Age: 43
End: 2023-08-14
Payer: COMMERCIAL

## 2023-08-28 ENCOUNTER — PATIENT OUTREACH (OUTPATIENT)
Dept: CARE COORDINATION | Facility: CLINIC | Age: 43
End: 2023-08-28
Payer: COMMERCIAL

## 2023-10-04 ENCOUNTER — VIRTUAL VISIT (OUTPATIENT)
Dept: FAMILY MEDICINE | Facility: CLINIC | Age: 43
End: 2023-10-04
Payer: COMMERCIAL

## 2023-10-04 DIAGNOSIS — F41.1 GAD (GENERALIZED ANXIETY DISORDER): Primary | ICD-10-CM

## 2023-10-04 PROCEDURE — 99212 OFFICE O/P EST SF 10 MIN: CPT | Mod: 95 | Performed by: PHYSICIAN ASSISTANT

## 2023-10-04 ASSESSMENT — ANXIETY QUESTIONNAIRES
IF YOU CHECKED OFF ANY PROBLEMS ON THIS QUESTIONNAIRE, HOW DIFFICULT HAVE THESE PROBLEMS MADE IT FOR YOU TO DO YOUR WORK, TAKE CARE OF THINGS AT HOME, OR GET ALONG WITH OTHER PEOPLE: EXTREMELY DIFFICULT
7. FEELING AFRAID AS IF SOMETHING AWFUL MIGHT HAPPEN: NEARLY EVERY DAY
5. BEING SO RESTLESS THAT IT IS HARD TO SIT STILL: NEARLY EVERY DAY
4. TROUBLE RELAXING: NEARLY EVERY DAY
6. BECOMING EASILY ANNOYED OR IRRITABLE: NEARLY EVERY DAY
GAD7 TOTAL SCORE: 21
1. FEELING NERVOUS, ANXIOUS, OR ON EDGE: NEARLY EVERY DAY
GAD7 TOTAL SCORE: 21
3. WORRYING TOO MUCH ABOUT DIFFERENT THINGS: NEARLY EVERY DAY
2. NOT BEING ABLE TO STOP OR CONTROL WORRYING: NEARLY EVERY DAY

## 2023-10-04 NOTE — PATIENT INSTRUCTIONS
Referral for Behavioral Health Clinician (BHC)    During our visit, I encouraged you talk with our Behavioral Health Clinician (BHC). A BHC would be a great addition to your care team and will support your whole health!    What is a Behavioral Health Clinician (BHC)?    A BHC is a licensed mental health therapist. They can help you when behaviors, emotions, stress or worries get in the way of your life or health. Your BHC will work with you and your primary care team. Together, you'll come up with a unique care plan that works for you!         [facility] PATI Blas, XIMENA    What will happen when I meet with a BHC?    BHCs ask questions to better understand your concerns. They will provide brief, solution-focused therapy. They can also make referrals to a specialty therapist or other services to help you reach your goals.      How do I schedule an appointment with the [facility]?    Call [phone] and ask for a C appointment. You can schedule a virtual or in-person session.    How much time will I spend with the [facility]?     First visits are 45-60 minutes.  Return visits are typically 20-30 minutes.         How does billing work?      Outpatient mental health services are billed to insurance. Most plans don't charge a second co-pay if you see your primary care provider (PCP) the same day. Please verify your coverage and ask about your copay.

## 2023-10-04 NOTE — PROGRESS NOTES
Raul is a 43 year old who is being evaluated via a billable video visit.      How would you like to obtain your AVS? MyChart  If the video visit is dropped, the invitation should be resent by: Text to cell phone: [phone]  Will anyone else be joining your video visit? No          Assessment & Plan     (F41.1) MARISELA (generalized anxiety disorder)  (primary encounter diagnosis)  Comment: Patient had COVID infection September.  Does endorse that he had a syncopal episode but did not seek any medical evaluation.  No subsequent syncopal episodes.  Has been off work since then.  Notes he has had increased anxiety and exacerbation of symptoms surrounding returning to work.  Patient has exhausted sick time and medication time.  In discussion with his employer recommendation to look into short-term disability for his anxiety.  Patient does not want to start any medications.  Is not interested in any pharmacologic interventions at this time.  I discussed with the patient at length since I am not treating his anxiety I cannot complete short-term disability paperwork at this time.  Patient was amenable to speaking with a therapist about his ongoing symptoms.  Mental health referral placed for this.  Discussed further evaluation will be warranted for his syncopal episode.  Plan: If patient has any worsening new concerns to be seen again more urgently.  Referral placed for mental health.  Discussed following up with therapist.      [facility]   Raul is a 43 year old, presenting for the following health issues:  Insomnia and Anxiety (Lots of anxiety returning back to work )        10/4/2023    11:19 AM   Additional Questions   Roomed by Norris ERIC       History of Present Illness       Mental Health Follow-up:  Patient presents to follow-up on Anxiety.    Patient's anxiety since last visit has been:  Worse  The patient is not having other symptoms associated with  anxiety.  Any significant life events: job concerns  Patient is feeling anxious or having panic attacks.  Patient has no concerns about alcohol or drug use.    He eats 0-1 servings of fruits and vegetables daily.He consumes 3 sweetened beverage(s) daily.He exercises with enough effort to increase his heart rate 30 to 60 minutes per day.  He exercises with enough effort to increase his heart rate 3 or less days per week.   He is taking medications regularly.         1/25/2019     1:34 PM 10/4/2023    11:23 AM   MARISELA-7 SCORE   Total Score 0 (minimal anxiety) 21 (severe anxiety)   Total Score 0 21       Covid positive Friday 9/8/2023. Tuesday 9/12/2023 Fell/fainted while walking up an incline. Has been feeling exhausted and stressed with this. Has been having stress with work. Has not worked since 9/12/2023.  Patient reports significant anxiety surrounding work.  If he has any meetings with a supervisor he becomes nauseated to the point of vomiting.  With ongoing stressors related to family and work increased anxiety.  Denies any thoughts of self-harm.  Patient does not want to start any medications.  He was told by his work that he should reach out to his clinic for short-term disability related to his anxiety.      Review of Systems   Constitutional, HEENT, cardiovascular, pulmonary, gi and gu systems are negative, except as otherwise noted.      Objective           Vitals:  No vitals were obtained today due to virtual visit.    Physical Exam   GENERAL: Healthy, alert and no distress  EYES: Eyes grossly normal to inspection.  No discharge or erythema, or obvious scleral/conjunctival abnormalities.  RESP: No audible wheeze, cough, or visible cyanosis.  No visible retractions or increased work of breathing.    SKIN: Visible skin clear. No significant rash, abnormal pigmentation or lesions.  NEURO: Cranial nerves grossly intact.  Mentation and speech appropriate for age.  PSYCH: Mentation appears normal, affect  normal/bright, judgement and insight intact, normal speech and appearance well-groomed.                Video-Visit Details    Type of service:  Video Visit     Originating Location (pt. Location): Home    Distant Location (provider location):  On-site  Platform used for Video Visit: Go

## 2023-10-09 ENCOUNTER — OFFICE VISIT (OUTPATIENT)
Dept: BEHAVIORAL HEALTH | Facility: CLINIC | Age: 43
End: 2023-10-09
Payer: COMMERCIAL

## 2023-10-09 DIAGNOSIS — F41.1 GAD (GENERALIZED ANXIETY DISORDER): ICD-10-CM

## 2023-10-09 PROCEDURE — 90791 PSYCH DIAGNOSTIC EVALUATION: CPT

## 2023-10-09 ASSESSMENT — PATIENT HEALTH QUESTIONNAIRE - PHQ9
SUM OF ALL RESPONSES TO PHQ QUESTIONS 1-9: 17
10. IF YOU CHECKED OFF ANY PROBLEMS, HOW DIFFICULT HAVE THESE PROBLEMS MADE IT FOR YOU TO DO YOUR WORK, TAKE CARE OF THINGS AT HOME, OR GET ALONG WITH OTHER PEOPLE: EXTREMELY DIFFICULT
SUM OF ALL RESPONSES TO PHQ QUESTIONS 1-9: 17

## 2023-10-09 ASSESSMENT — COLUMBIA-SUICIDE SEVERITY RATING SCALE - C-SSRS
TOTAL  NUMBER OF ABORTED OR SELF INTERRUPTED ATTEMPTS LIFETIME: NO
6. HAVE YOU EVER DONE ANYTHING, STARTED TO DO ANYTHING, OR PREPARED TO DO ANYTHING TO END YOUR LIFE?: NO
1. HAVE YOU WISHED YOU WERE DEAD OR WISHED YOU COULD GO TO SLEEP AND NOT WAKE UP?: NO
ATTEMPT LIFETIME: NO
TOTAL  NUMBER OF INTERRUPTED ATTEMPTS LIFETIME: NO
2. HAVE YOU ACTUALLY HAD ANY THOUGHTS OF KILLING YOURSELF?: NO

## 2023-10-09 NOTE — Clinical Note
Dx adjustment with depression and anxiety. Frequency every 1-2 weeks. Working on decreasing compounded stressors and physical manifestations of negative mental health systems to improve overall functioning. Pt feels he is currently unable to work, inability per pt to receive accommodations or restrictions with his current role in the company. Looking for other employment. Disinterested in medication, [facility] only. -Love, [facility]

## 2023-10-09 NOTE — PROGRESS NOTES
"[facility] Primary Care: Integrated Behavioral Health      PATIENT'S NAME: Raul Abdul  PREFERRED NAME: Raul  PRONOUNS: he/him  MRN: [id number]  : 1980  ADDRESS: [address] [place] [id number]  [facility]  [id number]  DATE OF SERVICE: 10/09/23  START TIME: 9A  END TIME: 10A  PREFERRED PHONE: [phone]  May we leave a program related message: Yes  SERVICE MODALITY:  In-person    UNIVERSAL ADULT Mental Health DIAGNOSTIC ASSESSMENT    Identifying Information:  Patient is a 43 year old,    individual.  Patient was referred for an assessment by primary care provider .  Patient attended the session alone.    Chief Complaint:   The reason for seeking services at this time is: \"not sure\".  The problem(s) began 23. Pt states that work has become very tense, recently had a performance improvement plan and a review with leadership and was given an ultimatum. Pt states he has been employed there for the past year and feels he has been given unrealistic expectations with work which is compounded his mental health issues. He has negatively impacted sleep, constant rumination, fear, worry relating to providing for his family and feeling supported at work. Pt says he can be so anxious meeting with leadership he will throw up before meetings. He has bowel issues that exacerbate when his negative mental health symptoms increase. Pt states he is constantly on edge and wants to find another job where he can feel supported at work.     Patient has not attempted to resolve these concerns in the past.    Social/Family History:  Patient reported they grew up in [facility] [place] [facility]  .  They were raised by biological mother  .  Parents  / .  Patient reported that their childhood included many older siblings and one younger sibling. Parents were 17 years apart in age. Pt states that mother passed away in 2018 and father passed away in 2017. Pt has inconsistent " relationships with his siblings.    The patient describes their cultural background as .  Cultural influences and impact on patient's life structure, values, norms, and healthcare: not sure.     These factors will be addressed in the Preliminary Treatment plan. Patient identified their preferred language to be English. Patient reported they does not need the assistance of an  or other support involved in therapy.     Patient reported had no significant delays in developmental tasks.   Patient's highest education level was associate degree / vocational certificate  .  Patient identified the following learning problems: none reported.  Modifications will not be used to assist communication in therapy. Patient reports they are  able to understand written materials.    Patient's current relationship status is .   Patient identified their sexual orientation as heterosexual.  Patient reported having 2 child(filemon). Patient identified partner; friends; spouse as part of their support system.  Patient identified the quality of these relationships as stable and meaningful,  .      Patient's current living/housing situation involves staying in own home/apartment.  The immediate members of family and household include Walter Abdul, 43,wife and they report that housing is stable.    Patient is currently employed fulltime.  Patient reports their finances are obtained through employment. Patient does identify finances as a current stressor.      Patient reported that they have not been involved with the legal system. Patient does not report being under probation/ parole/ jurisdiction.     Patient's Strengths and Limitations:  Patient identified the following strengths or resources that will help them succeed in treatment: commitment to health and well being, family support, insight, intelligence, motivation, strong social skills, and work ethic. Things that may interfere with the patient's success in  treatment include:  specific target goal of getting short term disability .     Assessments:  The following assessments were completed by patient for this visit:    PHQ9:       1/25/2019     1:35 PM 10/9/2023     8:54 AM   PHQ-9 SCORE   PHQ-9 Total Score MyChart 0 17 (Moderately severe depression)   PHQ-9 Total Score 0 17     GAD7:       1/25/2019     1:34 PM 10/4/2023    11:23 AM   MARISELA-7 SCORE   Total Score 0 (minimal anxiety) 21 (severe anxiety)   Total Score 0 21     CAGE-AID:       10/9/2023     9:08 AM   CAGE-AID Total Score   Total Score 0   Total Score MyChart 0 (A total score of 2 or greater is considered clinically significant)     PROMIS 10-Global Health (only subscores and total score):       10/9/2023     9:07 AM   PROMIS-10 Scores Only   Global Mental Health Score 12   Global Physical Health Score 13   PROMIS TOTAL - SUBSCORES 25     [place] Suicide Severity Rating Scale (Lifetime/Recent)      1/21/2019     8:12 AM 1/22/2019     7:36 PM 10/9/2023    10:31 AM   [place] Suicide Severity Rating (Lifetime/Recent)   Q1 Wished to be Dead (Past Month) no no    Q2 Suicidal Thoughts (Past Month) no no    Q6 Suicide Behavior (Lifetime) no no    Q1 Wish to be Dead (Lifetime)   N   Q2 Non-Specific Active Suicidal Thoughts (Lifetime)   N   Actual Attempt (Lifetime)   N   Has subject engaged in non-suicidal self-injurious behavior? (Lifetime)   N   Interrupted Attempts (Lifetime)   N   Aborted or Self-Interrupted Attempt (Lifetime)   N   Preparatory Acts or Behavior (Lifetime)   N   Calculated C-SSRS Risk Score (Lifetime/Recent)   No Risk Indicated     Personal and Family Medical History:  Patient does not report a family history of mental health concerns.  Patient reports family history includes Atrial fibrillation in his mother; Cancer (age of onset: 40) in his brother; Diabetes in his father and mother; Heart Disease in his father; Hypertension in his father and mother; Prostate Cancer in his father..      Patient does not report Mental Health Diagnosis or Treatment.      Patient has had a physical exam to rule out medical causes for current symptoms.  Date of last physical exam was within the past year. Symptoms have developed since last physical exam and client was encouraged to follow up with PCP.  . The patient has a [facility] Primary Care Provider, who is named Juan Gonzales..  Patient reports no current medical and/or dental concerns.  Patient denies any issues with pain..   There are not significant appetite / nutritional concerns / weight changes.   Patient does not report a history of head injury / trauma / cognitive impairment.  Pt did state recently he was going to the mailbox and fell, thinks he may have fainted. No interventions for this injury.    Current Outpatient Medications   Medication    ibuprofen (ADVIL/MOTRIN) 200 MG tablet    omeprazole (PRILOSEC) 10 MG DR capsule    sildenafil (VIAGRA) 100 MG tablet    traMADol (ULTRAM) 50 MG tablet     No current facility-administered medications for this visit.      Medication Adherence:  Patient reports not currently prescribed.    Patient Allergies:  No Known Allergies    Medical History:    Past Medical History:   Diagnosis Date    Tobacco abuse      Current Mental Status Exam:   Appearance:  Appropriate    Eye Contact:  Good   Psychomotor:  Restless       Gait / station:  no problem  Attitude / Demeanor: Cooperative  Interested Attentive Bong  Speech      Rate / Production: Normal/ Responsive Talkative      Volume:  Normal  volume      Language:  intact  Mood:   Anxious  Dysphoric Anhedonia Fearful Ambivalence  Affect:   Worrisome    Thought Content: Clear  Rumination   Thought Process: Coherent  Goal Directed  Logical       Associations: No loosening of associations  Insight:   Good  and Intellectual Insight  Judgment:  Intact   Orientation:  All  Attention/concentration: Good    Substance Use:  Patient did report a family history of substance  use concerns; see medical history section for details. A couple siblings doing drugs such as cocaine, one was reportedly a drug distributor. Patient has not received chemical dependency treatment in the past.  Patient has not ever been to detox.  Patient is not currently receiving any chemical dependency treatment.       Substance History of use Age of first use Date of last use     Pattern and duration of use (include amounts and frequency)   Alcohol used in the past   15 10/01/22    Cannabis   used in the past 15 10/01/19    Amphetamines   never used        Cocaine/crack    never used          Hallucinogens never used            Inhalants never used            Heroin never used            Other Opiates never used        Benzodiazepine   never used        Barbiturates never used        Over the counter meds never used        Caffeine currently use 5      Nicotine  currently use 15 10/10/23    Other substances not listed above:  Identify:  never used          Patient reported the following problems as a result of their substance use: no problems, not applicable.    Substance Use: No symptoms    Based on the negative CAGE score and clinical interview there  are not indications of drug or alcohol abuse.    Significant Losses / Trauma / Abuse / Neglect Issues:   Patient  did serve in the .  There are indications or report of significant loss, trauma, abuse or neglect issues related to: are indications of trauma or loss but client denies these concerns.  Concerns for possible neglect are not present.     Safety Assessment:   Patient denies current homicidal ideation and behaviors.  Patient denies current self-injurious ideation and behaviors.    Patient denied risk behaviors associated with substance use.  Patient denies any high risk behaviors associated with mental health symptoms.  Patient reports the following current concerns for their personal safety: None.  Patient reports there   are no firearms in the  house.       History of Safety Concerns:  Patient denied a history of homicidal ideation.     Patient denied a history of personal safety concerns.    Patient denied a history of assaultive behaviors.    Patient denied a history of sexual assault behaviors.     Patient denied a history of risk behaviors associated with substance use.  Patient denies any history of high risk behaviors associated with mental health symptoms.  Patient reports the following protective factors:      Risk Plan:  See Recommendations for Safety and Risk Management Plan    Review of Symptoms per patient report:   Depression: Change in sleep, Difficulties concentrating, Feelings of helplessness, Ruminations, and Feeling sad, down, or depressed  Nataly:  No Symptoms  Psychosis: No Symptoms  Anxiety: Excessive worry, Nervousness, Physical complaints, such as headaches, stomachaches, muscle tension, Sleep disturbance, Ruminations, and Poor concentration  Panic:  No symptoms - anxiety can get so high anticipatory to a meeting/event he can get bowel issues, throw up, etc  Post Traumatic Stress Disorder:  No Symptoms   Eating Disorder: No Symptoms  ADD / ADHD:  No symptoms  Conduct Disorder: No symptoms  Autism Spectrum Disorder: No symptoms  Obsessive Compulsive Disorder: No Symptoms    Patient reports the following compulsive behaviors and treatment history:  none .      Diagnostic Criteria:   Adjustment Disorder  A. The development of emotional or behavioral symptoms in response to an identifiable stressor(s) occurring within 3 months of the onset of the stressor(s)  B. These symptoms or behaviors are clinically significant, as evidenced by one or both of the following:       - Marked distress that is out of proportion to the severity/intensity of the stressor (with consideration for external context & culture)       - Significant impairment in social, occupational, or other important areas of functioning  C. The stress-related disturbance does  "not meet criteria for another disorder & is not not an exacerbation of another mental disorder  D. The symptoms do not represent normal bereavement  E. Once the stressor or its consequences have terminated, the symptoms do not persist for more than an additional 6 months       * Adjustment Disorder with Mixed Anxiety and Depressed Mood: The predominant manifestation is a combination of depression and anxiety    Functional Status:  Patient reports the following functional impairments:  management of the household and or completion of tasks, organization, relationship(s), self-care, and work / vocational responsibilities.     Nonprogrammatic care:  Patient is requesting basic services to address current mental health concerns.    Clinical Summary:  1. Reason for assessment: \"not sure\"  .  2. Psychosocial, Cultural and Contextual Factors: Pt lives with wife and children. Pt has a job past year and it is becoming a very difficult environment for him to function due to contention with leadership. He was given ultimatums alongside a performance improvement plan.  3. Principal DSM5 Diagnoses  (Sustained by DSM5 Criteria Listed Above):   Adjustment Disorders  309.28 (F43.23) With mixed anxiety and depressed mood.  6. Prognosis: Expect Improvement and Relieve Acute Symptoms.  7. Likely consequences of symptoms if not treated: consider higher level of care.  8. Client strengths include:  educated, employed, goal-focused, intelligent, motivated, open to suggestions / feedback, willing to ask questions, and work history .     Recommendations:     1. Plan for Safety and Risk Management:   Safety and Risk: Recommended that patient call 911 or go to the local ED should there be a change in any of these risk factors..          Report to child / adult protection services was NA.     2. Patient's identified no cultural influences currently impacting pt MH presentation.    3. Initial Treatment will focus on:    Depressed Mood - " reframe negative thinking and improve mood  Anxiety - identify/utilize healthier ways to better manage anxiety sx  Adjustment Difficulties related to: employment concerns  Functional Impairment at: work.     4. Resources/Service Plan:    services are not indicated.   Modifications to assist communication are not indicated.   Additional disability accommodations are not indicated.      5. Collaboration: not clinically indicated currently.     6.  Referrals: [facility] only     Clinical Substantiation/medical necessity for the above recommendations:  After therapeutic assessment, intervention and referral consideration by clinician, the patient's circumstances and mental state were appropriate for outpatient/community management. It is the recommendation of this clinician that pt begin therapy with a [facility] for further mental health stabilization and continue to determine clinical need with future monitoring and intervention. At this time the pt is not presenting as an acute risk to self or others due to the following factors: multiple identified protective factors, denies SI/SIB/HI/AVH/KANDACE, identifies reasons to live, has never been to the ED or inpatient for mental health related issues. Pt presents with forward thinking and willingness to engage and participate in future interventions. Pt was agreeable to this recommendation.    7. KANDACE:  not clinically indicated currently.    8. Records:   These were reviewed at time of assessment.   Information in this assessment was obtained from the medical record and  provided by patient who is a good historian.    Patient will have open access to their mental health medical record.    9.   Interactive Complexity: No    10. Safety Plan:  Patient denied any current/recent/lifetime history of suicidal ideation and/or behaviors.  No safety plan indicated at this time.     Provider Name/ Credentials:  [facility]I Blas, [facility]  October 9, 2023

## 2023-11-25 ENCOUNTER — HEALTH MAINTENANCE LETTER (OUTPATIENT)
Age: 43
End: 2023-11-25

## 2024-01-01 NOTE — PROGRESS NOTES
SUBJECTIVE:   CC: Raul Abdul is an 38 year old male who presents for preventative health visit.     Would like std screening. No symptoms. No dysuria/rashes.    2 kids - not interested in more child.   Daytime and snoring. No history deviated or sinus congestion.  Sleeping on sides. No breath-rite. Rare ALCOHOL.   Family history - dad marcel.  Insomnia- issues with falling asleep.  Drinks pop 4-10 cans dr.pepper.   No family history dm  Dad with mi in 50yos.   Mom with cva - 71yo.   Dad with prostate elderly. No family history colon. Older brother with melanoma  Smokes 3/4pdd. meds in past hit/miss. Did patch and nicotine supplements.   Emotionally stressed - not interested in therapist or meds.   Exercise regularly.   Physical   Annual:     Getting at least 3 servings of Calcium per day:  Yes    Bi-annual eye exam:  Yes    Dental care twice a year:  Yes    Sleep apnea or symptoms of sleep apnea:  Daytime drowsiness, Excessive snoring and Sleep apnea    Diet:  Regular (no restrictions)    Frequency of exercise:  1 day/week    Duration of exercise:  15-30 minutes    Taking medications regularly:  Not Applicable    Additional concerns today:  YES    PROBLEMS TO ADD ON...    Today's PHQ-2 Score:   PHQ-2 ( 1999 Pf[employer]) 10/22/2018   Q1: Little interest or pleasure in doing things 0   Q2: Feeling down, depressed or hopeless 0   PHQ-2 Score 0   Q1: Little interest or pleasure in doing things Not at all   Q2: Feeling down, depressed or hopeless Not at all   PHQ-2 Score 0       Abuse: Current or Past(Physical, Sexual or Emotional)- No  Do you feel safe in your environment - Yes    Social History   Substance Use Topics     Smoking status: Current Every Day Smoker     Packs/day: 0.50     Years: 12.00     Types: Cigarettes     Smokeless tobacco: Never Used     Alcohol use 0.0 oz/week      Comment: Socially, rare. 6 beers in a month     Alcohol Use 10/22/2018   If you drink alcohol do you typically have greater than 3  "drinks per day OR greater than 7 drinks per week? No       Last PSA:   PSA   Date Value Ref Range Status   05/16/2014 1.10 0 - 4 ug/L Final       Reviewed orders with patient. Reviewed health maintenance and updated orders accordingly - Yes  Labs reviewed in EPIC    Reviewed and updated as needed this visit by clinical staff  Tobacco  Allergies  Meds         Reviewed and updated as needed this visit by Provider            Review of Systems   Constitutional: Negative for chills and fever.   HENT: Negative for congestion, ear pain, hearing loss and sore throat.    Eyes: Negative for pain and visual disturbance.   Respiratory: Negative for cough and shortness of breath.    Cardiovascular: Negative for chest pain, palpitations and peripheral edema.   Gastrointestinal: Negative for abdominal pain, constipation, diarrhea, heartburn, hematochezia and nausea.   Genitourinary: Negative for discharge, dysuria, frequency, genital sores, hematuria, impotence and urgency.   Musculoskeletal: Negative for arthralgias, joint swelling and myalgias.   Skin: Negative for rash.   Neurological: Negative for dizziness, weakness, headaches and paresthesias.   Psychiatric/Behavioral: Negative for mood changes. The patient is not nervous/anxious.      All other ROS negative.       OBJECTIVE:   /82  Pulse 89  Temp 97.1  F (36.2  C) (Oral)  Resp 16  Ht 5' 11\" (1.803 m)  Wt 206 lb (93.4 kg)  SpO2 99%  BMI 28.73 kg/m2    Physical Exam  GENERAL: healthy, alert and no distress  EYES: Eyes grossly normal to inspection, PERRL and conjunctivae and sclerae normal  HENT: ear canals and TM's normal, nose and mouth without ulcers or lesions  NECK: no adenopathy, no asymmetry, masses, or scars and thyroid normal to palpation  RESP: lungs clear to auscultation - no rales, rhonchi or wheezes  BREAST: normal without masses, tenderness or nipple discharge and no palpable axillary masses or adenopathy  CV: regular rate and rhythm, normal S1 " S2, no S3 or S4, no murmur, click or rub, no peripheral edema and peripheral pulses strong  ABDOMEN: soft, nontender, no hepatosplenomegaly, no masses and bowel sounds normal   (male): normal male genitalia without lesions or urethral discharge, no hernia  MS: no gross musculoskeletal defects noted, no edema  SKIN: no suspicious lesions or rashes  NEURO: Normal strength and tone, mentation intact and speech normal  BACK: no CVA tenderness, no paralumbar tenderness  PSYCH: mentation appears normal, affect normal/bright and  Mildly anxious  LYMPH: no cervical, supraclavicular, axillary, or inguinal adenopathy      ASSESSMENT/PLAN:   ASSESSMENT / PLAN:  (Z00.00) Routine history and physical examination of adult  (primary encounter diagnosis)  Comment: generally healthy and normal exam  Plan: CBC with platelets, Comprehensive metabolic         panel (BMP + Alb, Alk Phos, ALT, AST, Total.         Bili, TP)        Reviewed self mole/testicle check handout.  vitaminD supplement.   Fish oil. Continue exercise.   (Z11.3) Screen for STD (sexually transmitted disease)  Comment: no symptoms   Plan: HIV Antigen Antibody Combo, Chlamydia         trachomatis PCR, Neisseria gonorrhoeae PCR,         Hepatitis B surface antigen, Hepatitis C         antibody        Await labs. Return to clinic if symptoms.     (F17.200) Tobacco use disorder  Plan: patient will continue cut down    (R06.83) Snoring  Comment: possible marcel  Plan: SLEEP EVALUATION & MANAGEMENT REFERRAL - [facility]         -[facility]          [phone] (Age 15 and up)        Breath rite. Follow-up sleep specialist.   Melatonin prn sleep.     # given for vasc consult.     COUNSELING:   Reviewed preventive health counseling, as reflected in patient instructions       Regular exercise       Healthy diet/nutrition       Vision screening       Osteoporosis Prevention/Bone Health    BP Readings from Last 1 Encounters:   10/22/18 126/82  "    Estimated body mass index is 28.73 kg/(m^2) as calculated from the following:    Height as of this encounter: 5' 11\" (1.803 m).    Weight as of this encounter: 206 lb (93.4 kg).           reports that he has been smoking Cigarettes.  He has a 6.00 pack-year smoking history. He has never used smokeless tobacco.      Counseling Resources:  ATP IV Guidelines  Pooled Cohorts Equation Calculator  FRAX Risk Assessment  ICSI Preventive Guidelines  Dietary Guidelines for Americans, 2010  USDA's MyPlate  ASA Prophylaxis  Lung CA Screening    Juan Gonzales MD  [facility]  Answers for HPI/ROS submitted by the patient on 10/22/2018   PHQ-2 Score: 0    " Feeding and  care were discussed today and parent questions were answered

## 2024-06-11 DIAGNOSIS — N52.9 ERECTILE DYSFUNCTION, UNSPECIFIED ERECTILE DYSFUNCTION TYPE: ICD-10-CM

## 2024-06-12 RX ORDER — SILDENAFIL 100 MG/1
TABLET, FILM COATED ORAL
Qty: 30 TABLET | Refills: 11 | Status: SHIPPED | OUTPATIENT
Start: 2024-06-12

## 2024-06-12 NOTE — TELEPHONE ENCOUNTER
ONE TIME JAVIER refill.   Last OV= 01/2023.   Does patient have an upcoming appointment? No.   Follow up:Send [employer] 3D message to patient   Pending Prescriptions:                       Disp   Refills    sildenafil (VIAGRA) 100 MG tablet         30 tab*11           Sig: TAKE 1 TABLET BY MOUTH ONCE DAILY AS NEEDED. You           must call to ensure future fills       Zaida EAST RN Urology 6/12/2024 10:15 AM

## 2024-10-16 ENCOUNTER — HOSPITAL ENCOUNTER (EMERGENCY)
Facility: CLINIC | Age: 44
Discharge: HOME | End: 2024-10-16
Attending: FAMILY MEDICINE | Admitting: FAMILY MEDICINE
Payer: COMMERCIAL

## 2024-10-16 ENCOUNTER — APPOINTMENT (OUTPATIENT)
Dept: CT IMAGING | Facility: CLINIC | Age: 44
End: 2024-10-16
Attending: FAMILY MEDICINE
Payer: COMMERCIAL

## 2024-10-16 VITALS
HEART RATE: 99 BPM | TEMPERATURE: 97.8 F | BODY MASS INDEX: 26.6 KG/M2 | SYSTOLIC BLOOD PRESSURE: 119 MMHG | HEIGHT: 71 IN | WEIGHT: 190 LBS | DIASTOLIC BLOOD PRESSURE: 72 MMHG | RESPIRATION RATE: 18 BRPM | OXYGEN SATURATION: 97 %

## 2024-10-16 DIAGNOSIS — G43.909 MIGRAINE WITHOUT STATUS MIGRAINOSUS, NOT INTRACTABLE, UNSPECIFIED MIGRAINE TYPE: ICD-10-CM

## 2024-10-16 DIAGNOSIS — E87.6 HYPOKALEMIA: ICD-10-CM

## 2024-10-16 LAB
ALBUMIN SERPL BCG-MCNC: 4.3 G/DL (ref 3.5–5.2)
ALP SERPL-CCNC: 86 U/L (ref 40–150)
ALT SERPL W P-5'-P-CCNC: 45 U/L (ref 0–70)
ANION GAP SERPL CALCULATED.3IONS-SCNC: 15 MMOL/L (ref 7–15)
ANION GAP SERPL CALCULATED.3IONS-SCNC: 15 MMOL/L (ref 7–15)
AST SERPL W P-5'-P-CCNC: 38 U/L (ref 0–45)
BASOPHILS # BLD AUTO: 0.1 10E3/UL (ref 0–0.2)
BASOPHILS NFR BLD AUTO: 1 %
BILIRUB SERPL-MCNC: 0.5 MG/DL
BUN SERPL-MCNC: 5.1 MG/DL (ref 6–20)
BUN SERPL-MCNC: 5.1 MG/DL (ref 6–20)
CALCIUM SERPL-MCNC: 9.2 MG/DL (ref 8.8–10.4)
CALCIUM SERPL-MCNC: 9.2 MG/DL (ref 8.8–10.4)
CHLORIDE SERPL-SCNC: 95 MMOL/L (ref 98–107)
CHLORIDE SERPL-SCNC: 95 MMOL/L (ref 98–107)
CREAT SERPL-MCNC: 1 MG/DL (ref 0.67–1.17)
CREAT SERPL-MCNC: 1 MG/DL (ref 0.67–1.17)
CRP SERPL-MCNC: 3.24 MG/L
EGFRCR SERPLBLD CKD-EPI 2021: >90 ML/MIN/1.73M2
EGFRCR SERPLBLD CKD-EPI 2021: >90 ML/MIN/1.73M2
EOSINOPHIL # BLD AUTO: 0.2 10E3/UL (ref 0–0.7)
EOSINOPHIL NFR BLD AUTO: 3 %
ERYTHROCYTE [DISTWIDTH] IN BLOOD BY AUTOMATED COUNT: 12.3 % (ref 10–15)
GLUCOSE SERPL-MCNC: 120 MG/DL (ref 70–99)
GLUCOSE SERPL-MCNC: 120 MG/DL (ref 70–99)
HCO3 SERPL-SCNC: 26 MMOL/L (ref 22–29)
HCO3 SERPL-SCNC: 26 MMOL/L (ref 22–29)
HCT VFR BLD AUTO: 41.8 % (ref 40–53)
HGB BLD-MCNC: 14.9 G/DL (ref 13.3–17.7)
HOLD SPECIMEN: NORMAL
IMM GRANULOCYTES # BLD: 0 10E3/UL
IMM GRANULOCYTES NFR BLD: 0 %
LYMPHOCYTES # BLD AUTO: 2.1 10E3/UL (ref 0.8–5.3)
LYMPHOCYTES NFR BLD AUTO: 34 %
MCH RBC QN AUTO: 32.7 PG (ref 26.5–33)
MCHC RBC AUTO-ENTMCNC: 35.6 G/DL (ref 31.5–36.5)
MCV RBC AUTO: 92 FL (ref 78–100)
MONOCYTES # BLD AUTO: 0.4 10E3/UL (ref 0–1.3)
MONOCYTES NFR BLD AUTO: 6 %
NEUTROPHILS # BLD AUTO: 3.5 10E3/UL (ref 1.6–8.3)
NEUTROPHILS NFR BLD AUTO: 57 %
NRBC # BLD AUTO: 0 10E3/UL
NRBC BLD AUTO-RTO: 0 /100
PLATELET # BLD AUTO: 324 10E3/UL (ref 150–450)
POTASSIUM SERPL-SCNC: 3 MMOL/L (ref 3.4–5.3)
POTASSIUM SERPL-SCNC: 3 MMOL/L (ref 3.4–5.3)
PROT SERPL-MCNC: 6.9 G/DL (ref 6.4–8.3)
RBC # BLD AUTO: 4.55 10E6/UL (ref 4.4–5.9)
SODIUM SERPL-SCNC: 136 MMOL/L (ref 135–145)
SODIUM SERPL-SCNC: 136 MMOL/L (ref 135–145)
WBC # BLD AUTO: 6.2 10E3/UL (ref 4–11)

## 2024-10-16 PROCEDURE — 99285 EMERGENCY DEPT VISIT HI MDM: CPT | Mod: 25 | Performed by: FAMILY MEDICINE

## 2024-10-16 PROCEDURE — 36415 COLL VENOUS BLD VENIPUNCTURE: CPT | Performed by: EMERGENCY MEDICINE

## 2024-10-16 PROCEDURE — 258N000003 HC RX IP 258 OP 636: Performed by: FAMILY MEDICINE

## 2024-10-16 PROCEDURE — 96361 HYDRATE IV INFUSION ADD-ON: CPT | Performed by: FAMILY MEDICINE

## 2024-10-16 PROCEDURE — 250N000013 HC RX MED GY IP 250 OP 250 PS 637: Performed by: FAMILY MEDICINE

## 2024-10-16 PROCEDURE — 96365 THER/PROPH/DIAG IV INF INIT: CPT | Performed by: FAMILY MEDICINE

## 2024-10-16 PROCEDURE — 99284 EMERGENCY DEPT VISIT MOD MDM: CPT | Performed by: FAMILY MEDICINE

## 2024-10-16 PROCEDURE — 250N000011 HC RX IP 250 OP 636: Performed by: FAMILY MEDICINE

## 2024-10-16 PROCEDURE — 96375 TX/PRO/DX INJ NEW DRUG ADDON: CPT | Performed by: FAMILY MEDICINE

## 2024-10-16 PROCEDURE — 70450 CT HEAD/BRAIN W/O DYE: CPT

## 2024-10-16 PROCEDURE — 85025 COMPLETE CBC W/AUTO DIFF WBC: CPT | Performed by: FAMILY MEDICINE

## 2024-10-16 PROCEDURE — 86140 C-REACTIVE PROTEIN: CPT | Performed by: FAMILY MEDICINE

## 2024-10-16 PROCEDURE — 80048 BASIC METABOLIC PNL TOTAL CA: CPT | Performed by: FAMILY MEDICINE

## 2024-10-16 PROCEDURE — 250N000011 HC RX IP 250 OP 636: Performed by: EMERGENCY MEDICINE

## 2024-10-16 RX ORDER — ONDANSETRON 2 MG/ML
4 INJECTION INTRAMUSCULAR; INTRAVENOUS ONCE
Status: DISCONTINUED | OUTPATIENT
Start: 2024-10-16 | End: 2024-10-16

## 2024-10-16 RX ORDER — HYDROMORPHONE HYDROCHLORIDE 1 MG/ML
0.5 INJECTION, SOLUTION INTRAMUSCULAR; INTRAVENOUS; SUBCUTANEOUS
Status: COMPLETED | OUTPATIENT
Start: 2024-10-16 | End: 2024-10-16

## 2024-10-16 RX ORDER — SUMATRIPTAN SUCCINATE 25 MG/1
25 TABLET ORAL
Qty: 20 TABLET | Refills: 0 | Status: SHIPPED | OUTPATIENT
Start: 2024-10-16

## 2024-10-16 RX ORDER — ONDANSETRON 2 MG/ML
4 INJECTION INTRAMUSCULAR; INTRAVENOUS ONCE
Status: COMPLETED | OUTPATIENT
Start: 2024-10-16 | End: 2024-10-16

## 2024-10-16 RX ORDER — POTASSIUM CHLORIDE 1500 MG/1
20 TABLET, EXTENDED RELEASE ORAL ONCE
Status: DISCONTINUED | OUTPATIENT
Start: 2024-10-17 | End: 2024-10-17 | Stop reason: HOSPADM

## 2024-10-16 RX ORDER — DIPHENHYDRAMINE HYDROCHLORIDE 50 MG/ML
25 INJECTION INTRAMUSCULAR; INTRAVENOUS ONCE
Status: COMPLETED | OUTPATIENT
Start: 2024-10-16 | End: 2024-10-16

## 2024-10-16 RX ORDER — MAGNESIUM SULFATE 1 G/100ML
1 INJECTION INTRAVENOUS ONCE
Status: COMPLETED | OUTPATIENT
Start: 2024-10-16 | End: 2024-10-16

## 2024-10-16 RX ORDER — POTASSIUM CHLORIDE 1500 MG/1
40 TABLET, EXTENDED RELEASE ORAL ONCE
Status: COMPLETED | OUTPATIENT
Start: 2024-10-16 | End: 2024-10-16

## 2024-10-16 RX ORDER — KETOROLAC TROMETHAMINE 15 MG/ML
15 INJECTION, SOLUTION INTRAMUSCULAR; INTRAVENOUS ONCE
Status: COMPLETED | OUTPATIENT
Start: 2024-10-16 | End: 2024-10-16

## 2024-10-16 RX ADMIN — DIPHENHYDRAMINE HYDROCHLORIDE 25 MG: 50 INJECTION INTRAMUSCULAR; INTRAVENOUS at 20:49

## 2024-10-16 RX ADMIN — MAGNESIUM SULFATE IN DEXTROSE 1 G: 10 INJECTION, SOLUTION INTRAVENOUS at 20:51

## 2024-10-16 RX ADMIN — HYDROMORPHONE HYDROCHLORIDE 0.5 MG: 1 INJECTION, SOLUTION INTRAMUSCULAR; INTRAVENOUS; SUBCUTANEOUS at 20:50

## 2024-10-16 RX ADMIN — SODIUM CHLORIDE 1000 ML: 9 INJECTION, SOLUTION INTRAVENOUS at 22:09

## 2024-10-16 RX ADMIN — KETOROLAC TROMETHAMINE 15 MG: 15 INJECTION, SOLUTION INTRAMUSCULAR; INTRAVENOUS at 22:26

## 2024-10-16 RX ADMIN — POTASSIUM CHLORIDE 40 MEQ: 1500 TABLET, EXTENDED RELEASE ORAL at 22:11

## 2024-10-16 RX ADMIN — ONDANSETRON 4 MG: 2 INJECTION INTRAMUSCULAR; INTRAVENOUS at 20:22

## 2024-10-16 ASSESSMENT — COLUMBIA-SUICIDE SEVERITY RATING SCALE - C-SSRS
6. HAVE YOU EVER DONE ANYTHING, STARTED TO DO ANYTHING, OR PREPARED TO DO ANYTHING TO END YOUR LIFE?: NO
2. HAVE YOU ACTUALLY HAD ANY THOUGHTS OF KILLING YOURSELF IN THE PAST MONTH?: NO
1. IN THE PAST MONTH, HAVE YOU WISHED YOU WERE DEAD OR WISHED YOU COULD GO TO SLEEP AND NOT WAKE UP?: NO

## 2024-10-16 ASSESSMENT — ACTIVITIES OF DAILY LIVING (ADL)
ADLS_ACUITY_SCORE: 35

## 2024-10-17 ENCOUNTER — TELEPHONE (OUTPATIENT)
Dept: FAMILY MEDICINE | Facility: CLINIC | Age: 44
End: 2024-10-17
Payer: COMMERCIAL

## 2024-10-17 NOTE — ED NOTES
Pt states pain is 2/10, much more tolerable, provider updated, anticipate discharge, VSS, call light within reach, will continue to monitor and assist as needed.

## 2024-10-17 NOTE — DISCHARGE INSTRUCTIONS
Home to rest, push fluids.  You may continue to use Excedrin or Tylenol/ibuprofen for headaches in the future however as we discussed you may find migraine medication specifically such as Imitrex as we discussed and I prescribed for you to be more effective.  Starting this as soon as possible after symptom onset that is consistent with migraine like you experienced today would be most effective.  Return as needed to the emergency department.  With respect to your low potassium at 3.0 as we discussed increase in dietary sources of potassium such as bananas or leafy green vegetables will likely be sufficient and you should have a recheck of the electrolytes and kidney function (basic metabolic profile) through your primary care provider in about 7 to 10 days.

## 2024-10-17 NOTE — ED NOTES
"Read and agree with triage note, pt states he has a hx of migraines, felt the headache started out as \"a normal migraine\" but progressively got worse associated with nausea and blurred vision but no vomiting,   "

## 2024-10-17 NOTE — ED PROVIDER NOTES
History     Chief Complaint   Patient presents with    Headache     Pt reports at about 530pm developed some blurred vision within 10 minutes developed a left sided headache, pt reports pain was 7/10. Pt went into a dark room, took some Excedrin. Pt reports HA is 8/10 now feeling nauseated      HPI  Raul Abdul is a 44 year old male, medical history is significant for tobacco use disorder, cholelithiasis status post laparoscopic cholecystectomy, presents to the emergency department with concerns of a headache left-sided and throbbing beginning around 5:30 PM preceded by about 10 minutes of blurred vision.  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  He thought maybe it was related to being in dark conditions for a long period of time and so went outside to the bright daylight.  This did not help in fact made it worse.  He returned and actually tried taking some Excedrin, dark room and tried a nap when he awoke he was more nauseated although has not vomited.  The headache is worse.  Headache was not described as worst headache of life, was not brought on by any type of Valsalva or straining type maneuver.  There is been no recent trauma or injury of any kind.  The patient describes his headache as being atypical for migraine for him as the treatment attempted at home with normally work.  Usually does not get nauseated.  His headaches typically happen about every other month.  This is different.  Photophobia, phonophobia.  He also noted after awakening from his nap a brief episode of tingling numbness pins-and-needles to the fingers of both hands.  No weakness.      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     Priority: Medium    Dyshidrotic dermatitis 09/18/2012     Priority: Medium        Past Medical History:    Past Medical History:   Diagnosis Date    Tobacco abuse        Past Surgical History:    Past Surgical History:   Procedure Laterality Date    AS REPAIR EPIGASTRIC HERNIA,REDUC      HYDROCELECTOMY SCROTAL Left 12/15/2022    Procedure: HYDROCELECTOMY, SCROTAL APPROACH, EXCISION OF SCROTAL SKIN LESIONS;  Surgeon: Nahum Boles MD;  Location: [facility] OR    LAPAROSCOPIC CHOLECYSTECTOMY N/A 8/20/2022    Procedure: CHOLECYSTECTOMY, LAPAROSCOPIC;  Surgeon: Rosie Ritter MD;  Location: [facility]       Family History:    Family History   Problem Relation Age of Onset    Diabetes Mother     Hypertension Mother     Atrial fibrillation Mother     Diabetes Father     Hypertension Father     Prostate Cancer Father     Heart Disease Father     Cancer Brother 40        Skin cancer, unsure which type       Social History:  Marital Status:   [2]  Social History     Tobacco Use    Smoking status: Every Day     Current packs/day: 1.00     Average packs/day: 1 pack/day for 20.0 years (20.0 ttl pk-yrs)     Types: Cigarettes    Smokeless tobacco: Never   Vaping Use    Vaping status: Never Used   Substance Use Topics    Alcohol use: Yes     Alcohol/week: 0.0 standard drinks of alcohol     Comment: Socially, rare. 6 beers in a month    Drug use: No        Medications:    ibuprofen (ADVIL/MOTRIN) 200 MG tablet  omeprazole (PRILOSEC) 10 MG DR capsule  sildenafil (VIAGRA) 100 MG tablet  SUMAtriptan (IMITREX) 25 MG tablet  traMADol (ULTRAM) 50 MG tablet          Review of Systems   All other systems reviewed and are negative.      Physical Exam   BP: 119/72  Pulse: 99  Temp: 97.8  F (36.6  C)  Resp: 18  Height: 180.3 cm (5' 11\")  Weight: 86.2 kg (190 lb)  SpO2: 99 %      Physical Exam  Vitals and nursing note " reviewed.   Constitutional:       General: He is in acute distress.      Appearance: Normal appearance. He is normal weight.      Comments: The patient appears acutely uncomfortable due to pain.  He keeps his eyes closed through most of our interview despite the lights being down in the room.   HENT:      Head: Normocephalic and atraumatic.      Right Ear: Tympanic membrane, ear canal and external ear normal.      Left Ear: Tympanic membrane, ear canal and external ear normal.      Nose: Nose normal.      Mouth/Throat:      Mouth: Mucous membranes are dry.      Pharynx: Oropharynx is clear.   Eyes:      Extraocular Movements: Extraocular movements intact.      Conjunctiva/sclera: Conjunctivae normal.      Pupils: Pupils are equal, round, and reactive to light.   Cardiovascular:      Rate and Rhythm: Normal rate and regular rhythm.      Pulses: Normal pulses.      Heart sounds: Normal heart sounds.   Pulmonary:      Effort: Pulmonary effort is normal.      Breath sounds: Normal breath sounds.   Abdominal:      General: Bowel sounds are normal.      Palpations: Abdomen is soft.   Musculoskeletal:         General: Normal range of motion.      Cervical back: Normal range of motion and neck supple.   Skin:     General: Skin is warm and dry.      Capillary Refill: Capillary refill takes less than 2 seconds.   Neurological:      General: No focal deficit present.      Mental Status: He is alert and oriented to person, place, and time.   Psychiatric:         Mood and Affect: Mood normal.         Behavior: Behavior normal.         ED Course        Procedures              Results for orders placed or performed during the hospital encounter of 10/16/24 (from the past 24 hour(s))   CBC with platelets, differential    Narrative    The following orders were created for panel order CBC with platelets, differential.  Procedure                               Abnormality         Status                     ---------                                -----------         ------                     CBC with platelets and d...[[id number]]                      Final result                 Please view results for these tests on the individual orders.   Comprehensive metabolic panel   Result Value Ref Range    Sodium 136 135 - 145 mmol/L    Potassium 3.0 (L) 3.4 - 5.3 mmol/L    Carbon Dioxide (CO2) 26 22 - 29 mmol/L    Anion Gap 15 7 - 15 mmol/L    Urea Nitrogen 5.1 (L) 6.0 - 20.0 mg/dL    Creatinine 1.00 0.67 - 1.17 mg/dL    GFR Estimate >90 >60 mL/min/1.73m2    Calcium 9.2 8.8 - 10.4 mg/dL    Chloride 95 (L) 98 - 107 mmol/L    Glucose 120 (H) 70 - 99 mg/dL    Alkaline Phosphatase 86 40 - 150 U/L    AST 38 0 - 45 U/L    ALT 45 0 - 70 U/L    Protein Total 6.9 6.4 - 8.3 g/dL    Albumin 4.3 3.5 - 5.2 g/dL    Bilirubin Total 0.5 <=1.2 mg/dL   [facility] Draw    Narrative    The following orders were created for panel order [facility] Draw.  Procedure                               Abnormality         Status                     ---------                               -----------         ------                     Extra Blue Top Tube[[id number]]                              Final result                 Please view results for these tests on the individual orders.   CBC with platelets and differential   Result Value Ref Range    WBC Count 6.2 4.0 - 11.0 10e3/uL    RBC Count 4.55 4.40 - 5.90 10e6/uL    Hemoglobin 14.9 13.3 - 17.7 g/dL    Hematocrit 41.8 40.0 - 53.0 %    MCV 92 78 - 100 fL    MCH 32.7 26.5 - 33.0 pg    MCHC 35.6 31.5 - 36.5 g/dL    RDW 12.3 10.0 - 15.0 %    Platelet Count 324 150 - 450 10e3/uL    % Neutrophils 57 %    % Lymphocytes 34 %    % Monocytes 6 %    % Eosinophils 3 %    % Basophils 1 %    % Immature Granulocytes 0 %    NRBCs per 100 WBC 0 <1 /100    Absolute Neutrophils 3.5 1.6 - 8.3 10e3/uL    Absolute Lymphocytes 2.1 0.8 - 5.3 10e3/uL    Absolute Monocytes 0.4 0.0 - 1.3 10e3/uL    Absolute Eosinophils 0.2 0.0 - 0.7 10e3/uL    Absolute Basophils  "0.1 0.0 - 0.2 10e3/uL    Absolute Immature Granulocytes 0.0 <=0.4 10e3/uL    Absolute NRBCs 0.0 10e3/uL   Extra Blue Top Tube   Result Value Ref Range    Hold Specimen [facility]    Basic metabolic panel   Result Value Ref Range    Sodium 136 135 - 145 mmol/L    Potassium 3.0 (L) 3.4 - 5.3 mmol/L    Chloride 95 (L) 98 - 107 mmol/L    Carbon Dioxide (CO2) 26 22 - 29 mmol/L    Anion Gap 15 7 - 15 mmol/L    Urea Nitrogen 5.1 (L) 6.0 - 20.0 mg/dL    Creatinine 1.00 0.67 - 1.17 mg/dL    GFR Estimate >90 >60 mL/min/1.73m2    Calcium 9.2 8.8 - 10.4 mg/dL    Glucose 120 (H) 70 - 99 mg/dL   CRP inflammation   Result Value Ref Range    CRP Inflammation 3.24 <5.00 mg/L   CT Head w/o Contrast    Narrative    EXAM: CT HEAD W/O CONTRAST  LOCATION: [facility]  DATE: 10/16/2024    INDICATION: Severe left sided throbbing headache unlike previous headaches, bilateral finger paresthesias.  COMPARISON: None.  TECHNIQUE: Routine CT Head without IV contrast. Multiplanar reformats. Dose reduction techniques were used.    FINDINGS:  INTRACRANIAL CONTENTS: No intracranial hemorrhage, extraaxial collection, or mass effect.  No CT evidence of acute infarct. Normal parenchymal attenuation. Normal ventricles and sulci.     VISUALIZED ORBITS/SINUSES/MASTOIDS: No intraorbital abnormality. No paranasal sinus mucosal disease. No middle ear or mastoid effusion.    BONES/SOFT TISSUES: No acute abnormality.      Impression    IMPRESSION:  1.  Normal head CT.     10:45 PM  Patient reports headache down to a 2/10 currently.  Feeling much better.  No nausea.  We discussed lab diagnostics as well as reassuring CT head imaging which is normal.  The patient's description of headache today is very consistent with migraine, more so than his self diagnosed \"migraines\".  We reviewed management which certainly can still include over-the-counter pain medication such as Tylenol/ibuprofen/Excedrin as he has been doing in the past in conjunction " with rest and fluids in a dark room.  After discussion with him I have prescribed Imitrex for him should he experience a similar headache to today's as he may find it more effective as abortive therapy especially if taken early enough in the course of migraine headache.  The patient was most appreciative of his care in the emergency department and disposition is to home with planned follow-up with a recheck after increasing potassium rich foods in his diet for his mild hypokalemia, with his primary in about 7 to 10 days.  He does not at the present time have an indication for medical potassium replacement.      Medications   sodium chloride 0.9% BOLUS 1,000 mL (1,000 mLs Intravenous $New Bag 10/16/24 2209)   potassium chloride jose a ER (KLOR-CON M20) CR tablet 20 mEq (has no administration in time range)   ondansetron (ZOFRAN) injection 4 mg (4 mg Intravenous $Given 10/16/24 2022)   magnesium sulfate 1 g in 100 mL D5W intermittent infusion (0 g Intravenous Stopped 10/16/24 2208)   diphenhydrAMINE (BENADRYL) injection 25 mg (25 mg Intravenous $Given 10/16/24 2049)   HYDROmorphone (PF) (DILAUDID) injection 0.5 mg (0.5 mg Intravenous $Given 10/16/24 2050)   potassium chloride jose a ER (KLOR-CON M20) CR tablet 40 mEq (40 mEq Oral $Given 10/16/24 2211)   ketorolac (TORADOL) injection 15 mg (15 mg Intravenous $Given 10/16/24 2226)       Assessments & Plan (with Medical Decision Making)   Assessment and plan with medical decision making at the time stamp above.      Disclaimer: This note consists of symbols derived from keyboarding, dictation and/or voice recognition software. As a result, there may be errors in the script that have gone undetected. Please consider this when interpreting information found in this chart.      I have reviewed the nursing notes.    I have reviewed the findings, diagnosis, plan and need for follow up with the patient.        New Prescriptions    SUMATRIPTAN (IMITREX) 25 MG TABLET    Take 1  tablet (25 mg) by mouth at onset of headache for migraine. May repeat in 2 hours. Max 8 tablets/24 hours.       Final diagnoses:   Migraine without status migrainosus, not intractable, unspecified migraine type   Hypokalemia       10/16/2024   [facility] EMERGENCY DEPT       Prashant Braun MD  10/16/24 1668

## 2024-10-17 NOTE — ED TRIAGE NOTES
Pt reports at about 530pm developed some blurred vision within 10 minutes developed a left sided headache, pt reports pain was 7/10. Pt went into a dark room, took some Excedrin. Pt reports HA is 8/10 now feeling nauseated

## 2024-10-17 NOTE — ED NOTES
Pt states his headache is improving after medications rating the pain 6/10, VSS, friend @ bedside, awaiting transport to CT, call light within reach, will continue to monitor and assist as needed.

## 2024-10-17 NOTE — TELEPHONE ENCOUNTER
Pt unable to complete entire hospital follow-up call because he is in a conference call for work at time of RN call.     : Pt requests a call back tomorrow (10/18/24) to schedule a hospital f/u appointment (to f/u on migraine and recheck BMP labs), within 7-10 days of discharge, as advised in ED discharge plan.         Transitions of Care Outreach  Chief Complaint   Patient presents with    Hospital F/U       Most Recent Admission Date: 10/16/2024   Most Recent Admission Diagnosis:      Most Recent Discharge Date: 10/16/2024   Most Recent Discharge Diagnosis: Migraine without status migrainosus, not intractable, unspecified migraine type - G43.909  Hypokalemia - E87.6     Transitions of Care Assessment    Discharge Assessment  Does the patient have their discharge instructions? : Unknown  Does the patient have questions regarding their discharge instructions? : No  Were you started on any new medications or were there changes to any of your previous medications? : Yes    Follow up Plan     Discharge Follow-Up  Discharge follow up appointment scheduled in alignment with recommended follow up timeframe or Transitions of Risk Category? (Low = within 30 days; Moderate= within 14 days; High= within 7 days): No (Pt states he is not available to schedule at this time, as he is in a conference call for work, but requests a call back tomorrow to schedule a hospital f/u as advised in ED discharge plan.)    No future appointments.    Outpatient Plan as outlined on AVS reviewed with patient.    For any urgent concerns, please contact our 24 hour nurse triage line: [phone] ([phone])       Jada Roberts RN

## 2024-10-18 NOTE — TELEPHONE ENCOUNTER
Called and spoke to patient, appointment made with Dr Gonzales for 10/21/24.Deena Wade [facility]

## 2024-10-21 ENCOUNTER — OFFICE VISIT (OUTPATIENT)
Dept: FAMILY MEDICINE | Facility: CLINIC | Age: 44
End: 2024-10-21
Payer: COMMERCIAL

## 2024-10-21 VITALS
HEART RATE: 89 BPM | BODY MASS INDEX: 28.31 KG/M2 | RESPIRATION RATE: 16 BRPM | OXYGEN SATURATION: 96 % | TEMPERATURE: 97.7 F | SYSTOLIC BLOOD PRESSURE: 127 MMHG | WEIGHT: 203 LBS | DIASTOLIC BLOOD PRESSURE: 74 MMHG

## 2024-10-21 DIAGNOSIS — R73.9 ELEVATED BLOOD SUGAR: ICD-10-CM

## 2024-10-21 DIAGNOSIS — G43.719 INTRACTABLE CHRONIC MIGRAINE WITHOUT AURA AND WITHOUT STATUS MIGRAINOSUS: Primary | ICD-10-CM

## 2024-10-21 DIAGNOSIS — E87.6 HYPOKALEMIA: ICD-10-CM

## 2024-10-21 LAB
EST. AVERAGE GLUCOSE BLD GHB EST-MCNC: 117 MG/DL
HBA1C MFR BLD: 5.7 % (ref 0–5.6)

## 2024-10-21 PROCEDURE — 36415 COLL VENOUS BLD VENIPUNCTURE: CPT | Performed by: FAMILY MEDICINE

## 2024-10-21 PROCEDURE — 83036 HEMOGLOBIN GLYCOSYLATED A1C: CPT | Performed by: FAMILY MEDICINE

## 2024-10-21 PROCEDURE — 99214 OFFICE O/P EST MOD 30 MIN: CPT | Performed by: FAMILY MEDICINE

## 2024-10-21 PROCEDURE — 80069 RENAL FUNCTION PANEL: CPT | Performed by: FAMILY MEDICINE

## 2024-10-21 ASSESSMENT — PAIN SCALES - GENERAL: PAINLEVEL: NO PAIN (0)

## 2024-10-21 NOTE — PROGRESS NOTES
ASSESSMENT / PLAN:  (G43.719) Intractable chronic migraine without aura and without status migrainosus  (primary encounter diagnosis)  Comment: needs help  Plan: follow-up eye provider and decrease pop.  Consisder elavil or atenolol.  Recheck in 6 months      (E87.6) Hypokalemia  Comment: likely a little dehydrated  Plan: Renal panel        More fruits/veggies. Limit pop. Await labs    (R73.9) Elevated blood sugar  Comment: x1 reading in ER  Plan: Renal panel, Hemoglobin A1c        Decrease pop. More exercise. Await labs.       Elisha Carter is a 44 year old, presenting for the following health issues:  Hospital F/U  Follow-up ed visit for headache. History migraines in past  Had cmp - low potassium and glucose 120. Normal cbc/crp.  Ct head - normal.   Patient given prescription for imitrex. Did fill yet.  Headaches can be a couple times/weeks.  Might be glasses related - seeing eye provider next week.   4 screens a desk.   History tobacco abuse. Lipids not too bad in past.        10/21/2024     4:11 PM   Additional Questions   Roomed by Malik REESE MA     [facility]       ED/UC Followup:    Facility:  [facility]  Date of visit: 10/16/24  Reason for visit: Migraine  Current Status: improved             Objective    /74   Pulse 89   Temp 97.7  F (36.5  C) (Tympanic)   Resp 16   Wt 92.1 kg (203 lb)   SpO2 96%   BMI 28.31 kg/m      Physical Exam   GENERAL: alert and no distress  EYES: Eyes grossly normal to inspection, PERRL and conjunctivae and sclerae normal  HENT: ear canals and TM's normal, nose and mouth without ulcers or lesions  NECK: no adenopathy, no asymmetry, masses, or scars  RESP: lungs clear to auscultation - no rales, rhonchi or wheezes  CV: regular rate and rhythm, normal S1 S2, no S3 or S4, no murmur, click or rub, no peripheral edema   ABDOMEN: soft, nontender, no hepatosplenomegaly, no masses and bowel sounds normal  MS: no gross musculoskeletal defects noted, no edema  NEURO:  Normal strength and tone, mentation intact and speech normal  PSYCH: mentation appears normal, affect normal/bright            Signed Electronically by: Juan Gonzales MD

## 2024-10-22 LAB
ALBUMIN SERPL BCG-MCNC: 4.4 G/DL (ref 3.5–5.2)
ANION GAP SERPL CALCULATED.3IONS-SCNC: 14 MMOL/L (ref 7–15)
BUN SERPL-MCNC: 4.6 MG/DL (ref 6–20)
CALCIUM SERPL-MCNC: 9.7 MG/DL (ref 8.8–10.4)
CHLORIDE SERPL-SCNC: 98 MMOL/L (ref 98–107)
CREAT SERPL-MCNC: 0.91 MG/DL (ref 0.67–1.17)
EGFRCR SERPLBLD CKD-EPI 2021: >90 ML/MIN/1.73M2
GLUCOSE SERPL-MCNC: 92 MG/DL (ref 70–99)
HCO3 SERPL-SCNC: 27 MMOL/L (ref 22–29)
PHOSPHATE SERPL-MCNC: 3.9 MG/DL (ref 2.5–4.5)
POTASSIUM SERPL-SCNC: 3.3 MMOL/L (ref 3.4–5.3)
SODIUM SERPL-SCNC: 139 MMOL/L (ref 135–145)

## 2024-12-29 ENCOUNTER — HEALTH MAINTENANCE LETTER (OUTPATIENT)
Age: 44
End: 2024-12-29

## (undated) DEVICE — ESU CORD MONOPOLAR 10'  E0510

## (undated) DEVICE — DEVICE SUTURE GRASPER TROCAR CLOSURE 14GA PMITCSG

## (undated) DEVICE — DRSG KERLIX FLUFFS X5

## (undated) DEVICE — ESU HOLSTER PLASTIC DISP E2400

## (undated) DEVICE — SOL WATER IRRIG 1000ML BOTTLE 07139-09

## (undated) DEVICE — NDL 19GA 1.5"

## (undated) DEVICE — ENDO POUCH UNIV RETRIEVAL SYSTEM INZII 10MM CD001

## (undated) DEVICE — GOWN LG DISP 9515

## (undated) DEVICE — SOL NACL 0.9% IRRIG 3000ML BAG 07972-08

## (undated) DEVICE — ESU ELEC NDL 1" COATED/INSULATED E1465

## (undated) DEVICE — PACK MINOR SBA15MIFSE

## (undated) DEVICE — DRAIN PENROSE 0.25"X18" LATEX FREE GR201

## (undated) DEVICE — Device

## (undated) DEVICE — ENDO TROCAR FIRST ENTRY KII FIOS ADV FIX 12X100MM CFF73

## (undated) DEVICE — PREP CHLORAPREP 26ML TINTED ORANGE  260815

## (undated) DEVICE — SYSTEM LAPAROVUE VISIBILITY LAPVUE10

## (undated) DEVICE — SUPPORTER ATHLETIC LG CLINITEX LF 67-1006

## (undated) DEVICE — SU MONOCRYL 4-0 PS-2 18" UND Y496G

## (undated) DEVICE — ENDO TROCAR FIRST ENTRY KII FIOS ADV FIX 05X100MM CFF03

## (undated) DEVICE — SUCTION IRR STRYKERFLOW II W/TIP 250-070-520

## (undated) DEVICE — ESU ENDO SCISSORS 5MM CVD 5DCS

## (undated) DEVICE — PREP SKIN SCRUB TRAY 4461A

## (undated) DEVICE — CLIP APPLIER ENDO ROTATING 10MM MED/LG ER320

## (undated) DEVICE — DRAPE POUCH INSTRUMENT 3 POCKET 1018L

## (undated) DEVICE — GLOVE PROTEXIS BLUE W/NEU-THERA 6.0  2D73EB60

## (undated) DEVICE — GLOVE PROTEXIS W/NEU-THERA 7.0  2D73TE70

## (undated) DEVICE — DRAPE TIBURON GENERAL ENDOSCOPY 9458

## (undated) DEVICE — DRAPE LAP W/ARMBOARD 29410

## (undated) DEVICE — ADH SKIN CLOSURE PREMIERPRO EXOFIN 1.0ML 3470

## (undated) DEVICE — SYR 50ML LL W/O NDL 309653

## (undated) DEVICE — STOCKING SLEEVE COMPRESSION CALF MED

## (undated) DEVICE — GLOVE PROTEXIS W/NEU-THERA 5.5  2D73TE55

## (undated) DEVICE — SU VICRYL 0 UR-6 27" J603H

## (undated) DEVICE — SU VICRYL 0 TIE 54" UND J287G

## (undated) DEVICE — ESU PENCIL SMOKE EVAC W/ROCKER SWITCH 0703-047-000

## (undated) DEVICE — ENDO TROCAR SLEEVE KII Z-THREADED 05X100MM CTS02

## (undated) DEVICE — SUCTION TIP YANKAUER W/O VENT K86

## (undated) DEVICE — SU CHROMIC 3-0 SH 27" G122H

## (undated) RX ORDER — KETOROLAC TROMETHAMINE 30 MG/ML
INJECTION, SOLUTION INTRAMUSCULAR; INTRAVENOUS
Status: DISPENSED
Start: 2022-12-15

## (undated) RX ORDER — CEFAZOLIN SODIUM/WATER 2 G/20 ML
SYRINGE (ML) INTRAVENOUS
Status: DISPENSED
Start: 2022-08-20

## (undated) RX ORDER — ONDANSETRON 2 MG/ML
INJECTION INTRAMUSCULAR; INTRAVENOUS
Status: DISPENSED
Start: 2022-12-15

## (undated) RX ORDER — PROPOFOL 10 MG/ML
INJECTION, EMULSION INTRAVENOUS
Status: DISPENSED
Start: 2022-12-15

## (undated) RX ORDER — PROPOFOL 10 MG/ML
INJECTION, EMULSION INTRAVENOUS
Status: DISPENSED
Start: 2022-08-20

## (undated) RX ORDER — CEFAZOLIN SODIUM 1 G/3ML
INJECTION, POWDER, FOR SOLUTION INTRAMUSCULAR; INTRAVENOUS
Status: DISPENSED
Start: 2022-12-15

## (undated) RX ORDER — DEXAMETHASONE SODIUM PHOSPHATE 10 MG/ML
INJECTION, SOLUTION INTRAMUSCULAR; INTRAVENOUS
Status: DISPENSED
Start: 2022-08-20

## (undated) RX ORDER — FENTANYL CITRATE 50 UG/ML
INJECTION, SOLUTION INTRAMUSCULAR; INTRAVENOUS
Status: DISPENSED
Start: 2022-08-20

## (undated) RX ORDER — FENTANYL CITRATE 50 UG/ML
INJECTION, SOLUTION INTRAMUSCULAR; INTRAVENOUS
Status: DISPENSED
Start: 2022-12-15

## (undated) RX ORDER — BUPIVACAINE HYDROCHLORIDE AND EPINEPHRINE 2.5; 5 MG/ML; UG/ML
INJECTION, SOLUTION EPIDURAL; INFILTRATION; INTRACAUDAL; PERINEURAL
Status: DISPENSED
Start: 2022-08-20

## (undated) RX ORDER — ONDANSETRON 2 MG/ML
INJECTION INTRAMUSCULAR; INTRAVENOUS
Status: DISPENSED
Start: 2022-08-20

## (undated) RX ORDER — ACETAMINOPHEN 325 MG/1
TABLET ORAL
Status: DISPENSED
Start: 2022-12-15

## (undated) RX ORDER — BUPIVACAINE HYDROCHLORIDE AND EPINEPHRINE 2.5; 5 MG/ML; UG/ML
INJECTION, SOLUTION EPIDURAL; INFILTRATION; INTRACAUDAL; PERINEURAL
Status: DISPENSED
Start: 2022-12-15